# Patient Record
Sex: MALE | Race: WHITE | NOT HISPANIC OR LATINO | Employment: FULL TIME | URBAN - METROPOLITAN AREA
[De-identification: names, ages, dates, MRNs, and addresses within clinical notes are randomized per-mention and may not be internally consistent; named-entity substitution may affect disease eponyms.]

---

## 2017-04-27 DIAGNOSIS — G40.909 SEIZURE DISORDER (H): ICD-10-CM

## 2017-04-27 NOTE — TELEPHONE ENCOUNTER
Routing refill request to provider for review/approval because:  Patient needs to be seen because:  Did not follow up in 10 weeks as advised

## 2017-04-27 NOTE — TELEPHONE ENCOUNTER
lamoTRIgine (LAMICTAL) 25 MG tablet      Last Written Prescription Date:  11/08/16  Last Fill Quantity: 360,   # refills: 1  Last Office Visit with [facility], [facility] or [facility] prescribing provider: 11/08/16  Future Office visit:       Routing refill request to provider for review/approval because:  Drug not on the [facility], [facility] or [facility] refill protocol or controlled substance      [facility]

## 2017-04-29 RX ORDER — LAMOTRIGINE 25 MG/1
50 TABLET ORAL 2 TIMES DAILY
Qty: 120 TABLET | Refills: 0 | Status: SHIPPED | OUTPATIENT
Start: 2017-04-29 | End: 2017-07-05

## 2017-06-13 ENCOUNTER — OFFICE VISIT (OUTPATIENT)
Dept: FAMILY MEDICINE | Facility: OTHER | Age: 42
End: 2017-06-13
Payer: COMMERCIAL

## 2017-06-13 VITALS
RESPIRATION RATE: 16 BRPM | SYSTOLIC BLOOD PRESSURE: 114 MMHG | WEIGHT: 157.5 LBS | TEMPERATURE: 98.6 F | DIASTOLIC BLOOD PRESSURE: 76 MMHG | HEIGHT: 69 IN | HEART RATE: 74 BPM | BODY MASS INDEX: 23.33 KG/M2
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
## 2017-06-13 PROCEDURE — 99213 OFFICE O/P EST LOW 20 MIN: CPT | Performed by: FAMILY MEDICINE

## 2017-06-13 RX ORDER — DOXYCYCLINE HYCLATE 100 MG
200 TABLET ORAL ONCE
Qty: 2 TABLET | Refills: 0 | Status: SHIPPED | OUTPATIENT
Start: 2017-06-13 | End: 2017-06-13

## 2017-06-13 ASSESSMENT — PAIN SCALES - GENERAL: PAINLEVEL: NO PAIN (0)

## 2017-06-13 NOTE — PROGRESS NOTES
"  SUBJECTIVE:                                                    Claude Higgins is a 42 year old male who presents to clinic today for the following health issues:    Concern - Bug bite      Onset: x 1 week    Description:   Left hip, patient states there is a bullseye that changes from large to small.     Intensity: no pain    Progression of Symptoms:  same    Accompanying Signs & Symptoms:  Itches, red, raises bump.       Previous history of similar problem:   no    Precipitating factors:   Worsened by: no    Alleviating factors:  Improved by: no       Therapies Tried and outcome: washed with moonshine and neosporin.        Associated symptoms:  Fever: no noted fevers    Headache no  Sore throat no  Fatigue no  Nausea/vomiting no  Muscle aches no  Joint pain no  Swollen lymph glands no  Stiff neck no  Other no      OBJECTIVE:  Nontoxic male in no acute distress.  /76  Pulse 74  Temp 98.6  F (37  C) (Temporal)  Resp 16  Ht 5' 9.25\" (1.759 m)  Wt 157 lb 8 oz (71.4 kg)  BMI 23.09 kg/m2    EXAM:   Rash description:     Location: left hip      Distribution: localized     Lesion grouping: single patch     Color: tan     Maximum diameter  1cm.        ASSESSMENT / IMPRESSION:  Tick Bite    PLAN:  Orders Placed This Encounter     TOBACCO CESSATION - FOR HEALTH MAINTENANCE     doxycycline (VIBRA-TABS) 100 MG tablet     Report back any chills, fever, significant skin rashes, myalgis or other acute symptoms. Otherwise, follow up as needed.     Loreto Clement MD      Patient Instructions     Tick Bite (No Abx)    You have been bitten by a tick. Ticks are small insects that feed on the blood of rodents, rabbits, birds, deer, dogs, and humans. The bite may cause a small local reaction like that of a spider, with a small amount of local redness, itching and slight swelling. Sometimes there is no local reaction.  Most tick bites are harmless, but some ticks carry diseases, such as Lyme disease or [place] " spotted fever, that can be passed to people at the time of the bite. Lyme disease is of greatest concern. Right now you have no symptoms of Lyme disease or other serious reaction to the bite. It is important to watch for the warning signs, which could appear weeks to months after the tick bite.  Home care  The following will help you care for your bite at home:  1. If itching is a problem, avoid tight clothing and anything that heats up your skin (hot showers/baths, direct sunlight). This will tend to make the itching worse. Use ice packs to reduce redness as well as itching.  2. An ice pack (ice cubes in a plastic bag, wrapped in a towel) will reduce local areas of redness and itching. Creams containing benzocaine (available over-the-counter) will reduce itching.  3. If large areas of the skin are involved, and if no other antihistamine was given, you can use an antihistamine with diphenhydramine, which is available at drug stores or groceries. It may be used to reduce itching if large areas of the skin are involved. If symptoms continue, seek the advice of your doctor or pharmacist about over-the-counter medications.  Follow-up care  Follow up with your doctor or as advised.  When to seek medical care  Get prompt medical attention if any of the following occur:  Signs of local infection (during next few days)    Increasing redness around the bite site    Increased pain or swelling    Fever over 100.4 F (38.0 C)    Fluid draining from the bite area  Signs of tick-related disease (during next few weeks to months)    Circular red ring-like rash appears at the bite area within 1 to 3 weeks    Tiredness, fever or chills, nausea or vomiting    Neck pain or stiffness, headache, confusion    Muscle, bone aching    Irregular or rapid heart beat    Joint pain or swelling (especially the knee joint)    Numbness or tingling or weakness in the arms or legs    Weakness on one side of the face    6652-5303 [employer] [address] [place] [address]. All rights reserved. This information is not intended as a substitute for professional medical care. Always follow your healthcare professional's instructions.

## 2017-06-13 NOTE — PATIENT INSTRUCTIONS
Tick Bite (No Abx)    You have been bitten by a tick. Ticks are small insects that feed on the blood of rodents, rabbits, birds, deer, dogs, and humans. The bite may cause a small local reaction like that of a spider, with a small amount of local redness, itching and slight swelling. Sometimes there is no local reaction.  Most tick bites are harmless, but some ticks carry diseases, such as Lyme disease or [place] spotted fever, that can be passed to people at the time of the bite. Lyme disease is of greatest concern. Right now you have no symptoms of Lyme disease or other serious reaction to the bite. It is important to watch for the warning signs, which could appear weeks to months after the tick bite.  Home care  The following will help you care for your bite at home:  1. If itching is a problem, avoid tight clothing and anything that heats up your skin (hot showers/baths, direct sunlight). This will tend to make the itching worse. Use ice packs to reduce redness as well as itching.  2. An ice pack (ice cubes in a plastic bag, wrapped in a towel) will reduce local areas of redness and itching. Creams containing benzocaine (available over-the-counter) will reduce itching.  3. If large areas of the skin are involved, and if no other antihistamine was given, you can use an antihistamine with diphenhydramine, which is available at drug stores or groceries. It may be used to reduce itching if large areas of the skin are involved. If symptoms continue, seek the advice of your doctor or pharmacist about over-the-counter medications.  Follow-up care  Follow up with your doctor or as advised.  When to seek medical care  Get prompt medical attention if any of the following occur:  Signs of local infection (during next few days)    Increasing redness around the bite site    Increased pain or swelling    Fever over 100.4 F (38.0 C)    Fluid draining from the bite area  Signs of tick-related disease (during next few  weeks to months)    Circular red ring-like rash appears at the bite area within 1 to 3 weeks    Tiredness, fever or chills, nausea or vomiting    Neck pain or stiffness, headache, confusion    Muscle, bone aching    Irregular or rapid heart beat    Joint pain or swelling (especially the knee joint)    Numbness or tingling or weakness in the arms or legs    Weakness on one side of the face    8817-5361 [employer]. [address] [place] [address], [place] [address]. All rights reserved. This information is not intended as a substitute for professional medical care. Always follow your healthcare professional's instructions.

## 2017-06-13 NOTE — NURSING NOTE
"Chief Complaint   Patient presents with     Insect Bites       Initial /76  Pulse 74  Temp 98.6  F (37  C) (Temporal)  Resp 16  Ht 5' 9.25\" (1.759 m)  Wt 157 lb 8 oz (71.4 kg)  BMI 23.09 kg/m2 Estimated body mass index is 23.09 kg/(m^2) as calculated from the following:    Height as of this encounter: 5' 9.25\" (1.759 m).    Weight as of this encounter: 157 lb 8 oz (71.4 kg).  Medication Reconciliation: complete    "

## 2017-06-13 NOTE — MR AVS SNAPSHOT
After Visit Summary   6/13/2017    Claude Higgins    MRN: [id number]           Patient Information     Date Of Birth          1975        Visit Information        Provider Department      6/13/2017 4:00 PM Loreto Clement MD [facility]        Today's Diagnoses     Tick bite, initial encounter    -  1      Care Instructions      Tick Bite (No Abx)    You have been bitten by a tick. Ticks are small insects that feed on the blood of rodents, rabbits, birds, deer, dogs, and humans. The bite may cause a small local reaction like that of a spider, with a small amount of local redness, itching and slight swelling. Sometimes there is no local reaction.  Most tick bites are harmless, but some ticks carry diseases, such as Lyme disease or [place] spotted fever, that can be passed to people at the time of the bite. Lyme disease is of greatest concern. Right now you have no symptoms of Lyme disease or other serious reaction to the bite. It is important to watch for the warning signs, which could appear weeks to months after the tick bite.  Home care  The following will help you care for your bite at home:  1. If itching is a problem, avoid tight clothing and anything that heats up your skin (hot showers/baths, direct sunlight). This will tend to make the itching worse. Use ice packs to reduce redness as well as itching.  2. An ice pack (ice cubes in a plastic bag, wrapped in a towel) will reduce local areas of redness and itching. Creams containing benzocaine (available over-the-counter) will reduce itching.  3. If large areas of the skin are involved, and if no other antihistamine was given, you can use an antihistamine with diphenhydramine, which is available at drug stores or groceries. It may be used to reduce itching if large areas of the skin are involved. If symptoms continue, seek the advice of your doctor or pharmacist about over-the-counter medications.  Follow-up  care  Follow up with your doctor or as advised.  When to seek medical care  Get prompt medical attention if any of the following occur:  Signs of local infection (during next few days)    Increasing redness around the bite site    Increased pain or swelling    Fever over 100.4 F (38.0 C)    Fluid draining from the bite area  Signs of tick-related disease (during next few weeks to months)    Circular red ring-like rash appears at the bite area within 1 to 3 weeks    Tiredness, fever or chills, nausea or vomiting    Neck pain or stiffness, headache, confusion    Muscle, bone aching    Irregular or rapid heart beat    Joint pain or swelling (especially the knee joint)    Numbness or tingling or weakness in the arms or legs    Weakness on one side of the face    3024-4740 [employer]. [address] [place] [address]. All rights reserved. This information is not intended as a substitute for professional medical care. Always follow your healthcare professional's instructions.                Follow-ups after your visit        Who to contact     If you have questions or need follow up information about today's clinic visit or your schedule please contact [facility] directly at [phone].  Normal or non-critical lab and imaging results will be communicated to you by MyChart, letter or phone within 4 business days after the clinic has received the results. If you do not hear from us within 7 days, please contact the clinic through [employer] or phone. If you have a critical or abnormal lab result, we will notify you by phone as soon as possible.  Submit refill requests through [employer] or call your pharmacy and they will forward the refill request to us. Please allow 3 business days for your refill to be completed.          Additional Information About Your Visit        [employer] Information     [employer] gives you secure access to your electronic health record. If you see a primary care  "provider, you can also send messages to your care team and make appointments. If you have questions, please call your primary care clinic.  If you do not have a primary care provider, please call [phone] and they will assist you.        Care EveryWhere ID     This is your Care EveryWhere ID. This could be used by other organizations to access your [facility] medical records  [id number]        Your Vitals Were     Pulse Temperature Respirations Height BMI (Body Mass Index)       74 98.6  F (37  C) (Temporal) 16 5' 9.25\" (1.759 m) 23.09 kg/m2        Blood Pressure from Last 3 Encounters:   06/13/17 114/76   11/08/16 115/76   10/17/16 132/88    Weight from Last 3 Encounters:   06/13/17 157 lb 8 oz (71.4 kg)   11/08/16 161 lb 9.6 oz (73.3 kg)   10/17/16 168 lb (76.2 kg)              We Performed the Following     TOBACCO CESSATION - FOR HEALTH MAINTENANCE          Today's Medication Changes          These changes are accurate as of: 6/13/17  4:29 PM.  If you have any questions, ask your nurse or doctor.               Start taking these medicines.        Dose/Directions    doxycycline 100 MG tablet   Commonly known as:  VIBRA-TABS   Used for:  Tick bite, initial encounter   Started by:  Loreto Clement MD        Dose:  200 mg   Take 2 tablets (200 mg) by mouth once for 1 dose   Quantity:  2 tablet   Refills:  0            Where to get your medicines      These medications were sent to [facility] [place] - [address]   [address] [place] [address]     Phone:  [phone]     doxycycline 100 MG tablet                Primary Care Provider Office Phone # Fax #    Loreto Clement [phone]       [facility] [address] [place] [address]        Thank you!     Thank you for choosing [facility]  for your care. Our goal is always to provide you with excellent care. Hearing back from our patients is one way we can " continue to improve our services. Please take a few minutes to complete the written survey that you may receive in the mail after your visit with us. Thank you!             Your Updated Medication List - Protect others around you: Learn how to safely use, store and throw away your medicines at www.disposemymeds.org.          This list is accurate as of: 6/13/17  4:29 PM.  Always use your most recent med list.                   Brand Name Dispense Instructions for use    ADVIL 200 MG capsule   Generic drug:  ibuprofen      Take 200 mg by mouth 3 times daily as needed       cholecalciferol 1000 UNIT tablet    vitamin D    90 tablet    Take 1 tablet (1,000 Units) by mouth daily       doxycycline 100 MG tablet    VIBRA-TABS    2 tablet    Take 2 tablets (200 mg) by mouth once for 1 dose       lamoTRIgine 25 MG tablet    LaMICtal    120 tablet    Take 2 tablets (50 mg) by mouth 2 times daily Needs office visit before next refill.       levETIRAcetam 250 MG tablet    KEPPRA    360 tablet    4 Weeks supply. Please start taking medications as per given written titrating schedule.

## 2017-07-05 ENCOUNTER — TELEPHONE (OUTPATIENT)
Dept: FAMILY MEDICINE | Facility: CLINIC | Age: 42
End: 2017-07-05

## 2017-07-05 ENCOUNTER — MYC REFILL (OUTPATIENT)
Dept: NEUROLOGY | Facility: CLINIC | Age: 42
End: 2017-07-05

## 2017-07-05 DIAGNOSIS — G40.909 SEIZURE DISORDER (H): ICD-10-CM

## 2017-07-05 NOTE — TELEPHONE ENCOUNTER
lamoTRIgine (LAMICTAL) 25 MG tablet      Last Written Prescription Date:  04/29/17  Last Fill Quantity: 120,   # refills: 0  Last Office Visit with [facility], [facility] or [facility] prescribing provider: 06/13/17  Future Office visit:       Routing refill request to provider for review/approval because:  Drug not on the [facility], [facility] or [facility] refill protocol or controlled substance      [facility]

## 2017-07-05 NOTE — TELEPHONE ENCOUNTER
Called and spoke with patient and explained he was supposed to follow up in 10 weeks, patient says he didn't know that but for now he is out of medication, he dont even have a evening dose for tonight. Patient says he tried to scheduling a follow up but, was told there was an issues with his insurance that he needed to call and figure out. Patient says in the meantime he needs a short term supply until he can figure his insurance out. Please advise  Jasmyn Valencia MA

## 2017-07-06 RX ORDER — LAMOTRIGINE 25 MG/1
50 TABLET ORAL 2 TIMES DAILY
Qty: 120 TABLET | Refills: 0 | Status: SHIPPED | OUTPATIENT
Start: 2017-07-06 | End: 2017-07-12

## 2017-07-06 RX ORDER — LAMOTRIGINE 25 MG/1
50 TABLET ORAL 2 TIMES DAILY
Qty: 120 TABLET | Refills: 0 | Status: SHIPPED | OUTPATIENT
Start: 2017-07-06 | End: 2017-07-23 | Stop reason: DRUGHIGH

## 2017-07-06 NOTE — TELEPHONE ENCOUNTER
Message from [employer]:  Original authorizing provider: Ilan Shen MD    Claude Higgins would like a refill of the following medications:  lamoTRIgine (LAMICTAL) 25 MG tablet [Ilan Shen MD]    Preferred pharmacy: [facility] [place] - [address]     Comment:  I have made a doctor's appointment for Claude to see you on the 12th of July could you please get his prescription filled ASAP! I ONLY HAVE ENOUGH FOR 3 DAYS VIA THE PHARMACY! PLEASE ADVISE!

## 2017-07-06 NOTE — TELEPHONE ENCOUNTER
Medication is being filled for 1 time refill only due to:  Patient needs to be seen because it has been 6 months since last OV. Has appointment on 7/12/2017 and can get further refills then. .      Signed Prescriptions:                        Disp   Refills    lamoTRIgine (LAMICTAL) 25 MG tablet        120 ta*0        Sig: Take 2 tablets (50 mg) by mouth 2 times daily Needs           office visit before next refill.  Authorizing Provider: TOAN MARTIN  Ordering User: CATERINA DURAN RN

## 2017-07-06 NOTE — TELEPHONE ENCOUNTER
lamoTRIgine (LAMICTAL)       Last Written Prescription Date: 04/29/2017  Last Fill Quantity: 120,  # refills: 0   Last Office Visit with [facility] or [facility] prescribing provider: 11/08/2016                                           Next 5 appointments (look out 90 days)     Jul 12, 2017  9:20 AM CDT   Return Visit with Ilan Shen MD   [facility] ([facility]    [address] [place] [address]   [phone]

## 2017-07-12 ENCOUNTER — OFFICE VISIT (OUTPATIENT)
Dept: NEUROLOGY | Facility: CLINIC | Age: 42
End: 2017-07-12
Payer: COMMERCIAL

## 2017-07-12 VITALS
SYSTOLIC BLOOD PRESSURE: 129 MMHG | HEART RATE: 73 BPM | DIASTOLIC BLOOD PRESSURE: 83 MMHG | HEIGHT: 69 IN | WEIGHT: 155 LBS | BODY MASS INDEX: 22.96 KG/M2

## 2017-07-12 DIAGNOSIS — Z78.9 ALCOHOL USE: ICD-10-CM

## 2017-07-12 DIAGNOSIS — G40.909 SEIZURE DISORDER (H): Primary | ICD-10-CM

## 2017-07-12 PROCEDURE — 99214 OFFICE O/P EST MOD 30 MIN: CPT | Performed by: PSYCHIATRY & NEUROLOGY

## 2017-07-12 NOTE — PATIENT INSTRUCTIONS
Orders Placed This Encounter   Procedures     GGT    Lamotrigine level (Ordered last time) along with GGT blood test as soon as possible     *Please call [facility] near your home to have morning appointment for the blood draw.   Blood sample for seizure medication (TROUGH LEVEL), before the 1st dose in the morning                   Previous CT Brain and blood test results reviewed again       Advised to seek help of Loreto Clement MD regarding alcohol intake. Encouraged to reduce and stop taking alcohol because of potential for it affecting seizures control.      Reminded again:  To follow the seizure precautions including driving restrictions as per [place] Law including- to report a seizure and date it occurred within thirty (30) days of the episode or event     Importance of compliance with anti-seizure medication (s).      Documenting the frequency of the seizures                 To go to local hospital ER for acute emergencies/seizure management  and arrange office follow-up for further review of medications    To call us for follow-up appointment after the work-up.    Thanks

## 2017-07-12 NOTE — NURSING NOTE
"Chief Complaint   Patient presents with     RECHECK     Seizure medication       Initial /83 (BP Location: Right arm, Patient Position: Chair, Cuff Size: Adult Regular)  Pulse 73  Ht 1.759 m (5' 9.25\")  Wt 70.3 kg (155 lb)  BMI 22.72 kg/m2 Estimated body mass index is 22.72 kg/(m^2) as calculated from the following:    Height as of this encounter: 1.759 m (5' 9.25\").    Weight as of this encounter: 70.3 kg (155 lb).  Medication Reconciliation: complete   Jasmyn Valencia MA      "

## 2017-07-12 NOTE — MR AVS SNAPSHOT
After Visit Summary   7/12/2017    Claude Higgins    MRN: [id number]           Patient Information     Date Of Birth          1975        Visit Information        Provider Department      7/12/2017 9:20 AM Ilan Shen MD [facility]        Today's Diagnoses     Seizure disorder (H)    -  1    Alcohol use          Care Instructions         Orders Placed This Encounter   Procedures     GGT    Lamotrigine level (Ordered last time) along with GGT blood test as soon as possible     *Please call [facility] near your home to have morning appointment for the blood draw.   Blood sample for seizure medication (TROUGH LEVEL), before the 1st dose in the morning                   Previous CT Brain and blood test results reviewed again       Advised to seek help of Loreto Clement MD regarding alcohol intake. Encouraged to reduce and stop taking alcohol because of potential for it affecting seizures control.      Reminded again:  To follow the seizure precautions including driving restrictions as per [place] Law including- to report a seizure and date it occurred within thirty (30) days of the episode or event     Importance of compliance with anti-seizure medication (s).      Documenting the frequency of the seizures                 To go to local hospital ER for acute emergencies/seizure management  and arrange office follow-up for further review of medications    To call us for follow-up appointment after the work-up.    Thanks            Follow-ups after your visit        Follow-up notes from your care team     Return in about 3 months (around 10/12/2017) for Seizure.      Future tests that were ordered for you today     Open Future Orders        Priority Expected Expires Ordered    GGT Routine 7/13/2017 7/12/2018 7/12/2017            Who to contact     If you have questions or need follow up information about today's clinic visit or your schedule please contact [facility] directly at [phone].  Normal or non-critical lab and imaging results will be communicated to you by MyChart, letter or phone within 4 business days after the clinic has received the results. If you do not hear from us within 7 days, please contact the clinic through [employer] or phone. If you have a critical or abnormal lab result, we will notify you by phone as soon as possible.  Submit refill requests through [employer] or call your pharmacy and they will forward the refill request to us. Please allow 3 business days for your refill to be completed.          Additional Information About Your Visit        [employer] Information     [employer] gives you secure access to your electronic health record. If you see a primary care provider, you can also send messages to your care team and make appointments. If you have questions, please call your primary care clinic.  If you do not have a primary care provider, please call [phone] and they will assist you.        Care EveryWhere ID     This is your Care EveryWhere ID. This could be used by other organizations to access your [facility] medical records  [id number]        Your Vitals Were     Pulse Height BMI (Body Mass Index)             73 1.759 m (5' 9.25\") 22.72 kg/m2          Blood Pressure from Last 3 Encounters:   07/12/17 129/83   06/13/17 114/76   11/08/16 115/76    Weight from Last 3 Encounters:   07/12/17 70.3 kg (155 lb)   06/13/17 71.4 kg (157 lb 8 oz)   11/08/16 73.3 kg (161 lb 9.6 oz)                 Today's Medication Changes          These changes are accurate as of: 7/12/17  9:36 AM.  If you have any questions, ask your nurse or doctor.               Stop taking these medicines if you haven't already. Please contact your care team if you have questions.     levETIRAcetam 250 MG tablet   Commonly known as:  KEPPRA   Stopped by:  Ilan Shen MD                    Primary Care Provider Office Phone # Fax #    Loreto Clement MD " [phone]       [facility] [address] [place] [address]        Equal Access to Services     CAROL DURAN : Hadii aad ku hadblanca Sage, idaniada maria alejandragalina, camille kaigor portillo, piero wallrachael phan. So [facility] [phone].    ATENCIÓN: Si habla español, tiene a nicholas disposición servicios gratuitos de asistencia lingüística. Llame al [phone].    We comply with applicable federal civil rights laws and [place] laws. We do not discriminate on the basis of race, color, national origin, age, disability sex, sexual orientation or gender identity.            Thank you!     Thank you for choosing [facility]  for your care. Our goal is always to provide you with excellent care. Hearing back from our patients is one way we can continue to improve our services. Please take a few minutes to complete the written survey that you may receive in the mail after your visit with us. Thank you!             Your Updated Medication List - Protect others around you: Learn how to safely use, store and throw away your medicines at www.disposemymeds.org.          This list is accurate as of: 7/12/17  9:36 AM.  Always use your most recent med list.                   Brand Name Dispense Instructions for use Diagnosis    ADVIL 200 MG capsule   Generic drug:  ibuprofen      Take 200 mg by mouth 3 times daily as needed        cholecalciferol 1000 UNIT tablet    vitamin D    90 tablet    Take 1 tablet (1,000 Units) by mouth daily    Low vitamin D level       lamoTRIgine 25 MG tablet    LaMICtal    120 tablet    Take 2 tablets (50 mg) by mouth 2 times daily Needs office visit before next refill.    Seizure disorder (H)

## 2017-07-12 NOTE — PROGRESS NOTES
ESTABLISHED PATIENT NEUROLOGY NOTE    LOCATION: [facility]   DATE OF VISIT: 2017  NAME: Mr.Aaron Higgins  : 1975 (42 year old)  MR #: [id number]    PRIMARY/REFERRING PROVIDER: Loreto Clement MD    REASON FOR VISIT: Seizure    HISTORY OF PRESENT ILLNESS: Mr. Higgins is a 42-year-old man with history of seizures. Last visit was in 2016. He has been taking lamotrigine 25 mg strength, 2 tablets 2 times a day. Previously he was taking Keppra and apparently it gave him side effects and therefore Keppra was discontinued. No side effects reported with lamotrigine. He is compliant with lamotrigine. Unfortunately he did not have the lamotrigine level done before this visit as suggested during last visit.No recurrence of seizures.  He relates that he has reduced his alcohol intake. He is drinking alcohol 2-3 times a week. He is drinking only beer, 2-3 beers each time. I note that his GGT done by Loreto Clement MD was quite high in 2016.  He had low normal vitamin D and he is compliant with taking vitamin D on a daily basis.  Vitamin B 12 level has been normal.  CT of the head done previously was normal.  His EEG done previously was also normal.  Thiamine level was in the normal range.      RECENT DIAGNOSTIC STUDIES:   Blood tests:    Component      Latest Ref Rng & Units 10/7/2016   GGT      0 - 75 U/L 266 (H)       CURRENT MEDICATIONS:   Current Outpatient Prescriptions on File Prior to Visit:  lamoTRIgine (LAMICTAL) 25 MG tablet Take 2 tablets (50 mg) by mouth 2 times daily Needs office visit before next refill.   cholecalciferol (VITAMIN D) 1000 UNIT tablet Take 1 tablet (1,000 Units) by mouth daily   ibuprofen (ADVIL) 200 MG capsule Take 200 mg by mouth 3 times daily as needed   [DISCONTINUED] lamoTRIgine (LAMICTAL) 25 MG tablet Take 2 tablets (50 mg) by mouth 2 times daily Needs office visit before next refill. (Patient not  "taking: Reported on 7/12/2017)     No current facility-administered medications on file prior to visit.   PAST MEDICAL HISTORY: Past Medical History:   Diagnosis Date     Alcohol use     Out-patient chemical dependancy treatment in 2003     GERD (gastroesophageal reflux disease)      Inguinal hernia 2015    left     Tobacco abuse      Past Surgical, Personal & Social history reviewed & documented in the [facility].  GENERAL EXAMINATION:  /83 (BP Location: Right arm, Patient Position: Chair, Cuff Size: Adult Regular)  Pulse 73  Ht 1.759 m (5' 9.25\")  Wt 70.3 kg (155 lb)  BMI 22.72 kg/m2    IMPRESSION:   Encounter Diagnoses   Name Primary?     Seizure disorder (H) Yes     Alcohol use      COMMENTS: Doing good so far. I'm hopeful that his seizures are controlled but the most important thing is that if he could reduce and stop taking ethanol as it would improve seizure control apart from lamotrigine. May have to adjust his lamotrigine dose after reviewing trough level of lamotrigine.  PLANS:   Patient Instructions          Orders Placed This Encounter   Procedures     GGT    Lamotrigine level (Ordered last time) along with GGT blood test as soon as possible     *Please call [facility] near your home to have morning appointment for the blood draw.   Blood sample for seizure medication (TROUGH LEVEL), before the 1st dose in the morning                   Previous CT Brain and blood test results reviewed again       Advised to seek help of Loreto Clement MD regarding alcohol intake. Encouraged to reduce and stop taking alcohol because of potential for it affecting seizures control.      Reminded again:  To follow the seizure precautions including driving restrictions as per [place] Law including- to report a seizure and date it occurred within thirty (30) days of the episode or event     Importance of compliance with anti-seizure medication (s).      Documenting the frequency of the seizures                 " To go to local hospital ER for acute emergencies/seizure management  and arrange office follow-up for further review of medications    To call us for follow-up appointment after the work-up.    Thanks    Thanks to Loreto Clement MD for allowing me to participate in Mr. Higgins's care. Please feel free to call me with any questions or concerns.     Total Time: 25 minutes. More than 50% of the time spent on counseling/coordinating the care.    Ilan Shen MD, [facility]  Neurologist    Cc: Loreto Clement MD

## 2017-07-18 DIAGNOSIS — G40.909 SEIZURE DISORDER (H): ICD-10-CM

## 2017-07-18 DIAGNOSIS — Z78.9 ALCOHOL USE: ICD-10-CM

## 2017-07-18 LAB — GGT SERPL-CCNC: 89 U/L (ref 0–75)

## 2017-07-18 PROCEDURE — 82977 ASSAY OF GGT: CPT | Performed by: PSYCHIATRY & NEUROLOGY

## 2017-07-18 PROCEDURE — 36415 COLL VENOUS BLD VENIPUNCTURE: CPT | Performed by: PSYCHIATRY & NEUROLOGY

## 2017-07-19 NOTE — PROGRESS NOTES
Ángel Mr. Higgins,   Your liver function tests and GGT is reduced compared to the previous level. Therefore I would encourage you to keep reducing your intake or stopping drinking alcohol.  Follow-up as recommended during your recent visit.      Thanks,       Ilan Shen MD, [facility]  Neurologist

## 2017-07-20 DIAGNOSIS — G40.909 SEIZURE DISORDER (H): ICD-10-CM

## 2017-07-20 PROCEDURE — 80175 DRUG SCREEN QUAN LAMOTRIGINE: CPT | Mod: 90 | Performed by: PSYCHIATRY & NEUROLOGY

## 2017-07-20 PROCEDURE — 36415 COLL VENOUS BLD VENIPUNCTURE: CPT | Performed by: PSYCHIATRY & NEUROLOGY

## 2017-07-20 PROCEDURE — 99000 SPECIMEN HANDLING OFFICE-LAB: CPT | Performed by: PSYCHIATRY & NEUROLOGY

## 2017-07-21 LAB — LAMOTRIGINE SERPL-MCNC: 1.5 UG/ML

## 2017-07-23 DIAGNOSIS — G40.909 SEIZURE DISORDER (H): Primary | ICD-10-CM

## 2017-07-23 RX ORDER — LAMOTRIGINE 25 MG/1
TABLET ORAL
Qty: 180 TABLET | Refills: 1 | Status: SHIPPED | OUTPATIENT
Start: 2017-07-23 | End: 2017-10-02

## 2017-07-23 NOTE — PROGRESS NOTES
New Lamotrigine prescription ordered in view of low Lamotrigine level.   Lamotrigine level suggested from week starting on 8/14/2017 to adjust Lamotrigine dose if needed.

## 2017-07-23 NOTE — PROGRESS NOTES
Ángel Mr. Higgins,     Your blood LAMOTRIGINE LEVEL is low. Therefore revised Lamotrigine dose ordered along with blood level again need  checking week beginning 8/14/2017 to adjust the dose if needed.  Follow-up after LAMOTRIGINE LEVEL in August, 2017.    Thanks,       Ilan Shen MD, [facility]  Neurologist

## 2017-07-31 ENCOUNTER — HOSPITAL ENCOUNTER (EMERGENCY)
Facility: CLINIC | Age: 42
Discharge: HOME | End: 2017-07-31
Attending: NURSE PRACTITIONER | Admitting: NURSE PRACTITIONER
Payer: COMMERCIAL

## 2017-07-31 ENCOUNTER — APPOINTMENT (OUTPATIENT)
Dept: GENERAL RADIOLOGY | Facility: CLINIC | Age: 42
End: 2017-07-31
Attending: NURSE PRACTITIONER
Payer: COMMERCIAL

## 2017-07-31 VITALS
WEIGHT: 157 LBS | RESPIRATION RATE: 15 BRPM | DIASTOLIC BLOOD PRESSURE: 72 MMHG | BODY MASS INDEX: 23.25 KG/M2 | HEIGHT: 69 IN | OXYGEN SATURATION: 99 % | SYSTOLIC BLOOD PRESSURE: 132 MMHG | HEART RATE: 74 BPM | TEMPERATURE: 98.9 F

## 2017-07-31 DIAGNOSIS — M25.511 RIGHT SHOULDER PAIN, UNSPECIFIED CHRONICITY: ICD-10-CM

## 2017-07-31 DIAGNOSIS — S49.91XA INJURY OF RIGHT SHOULDER, INITIAL ENCOUNTER: ICD-10-CM
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
## 2017-07-31 DIAGNOSIS — S49.91XA SHOULDER INJURY, RIGHT, INITIAL ENCOUNTER: ICD-10-CM

## 2017-07-31 PROCEDURE — 99283 EMERGENCY DEPT VISIT LOW MDM: CPT | Performed by: NURSE PRACTITIONER

## 2017-07-31 PROCEDURE — 73030 X-RAY EXAM OF SHOULDER: CPT | Mod: TC,RT

## 2017-07-31 PROCEDURE — 99284 EMERGENCY DEPT VISIT MOD MDM: CPT | Mod: Z6 | Performed by: NURSE PRACTITIONER

## 2017-07-31 PROCEDURE — 25000132 ZZH RX MED GY IP 250 OP 250 PS 637: Performed by: NURSE PRACTITIONER

## 2017-07-31 RX ORDER — ACETAMINOPHEN 325 MG/1
975 TABLET ORAL ONCE
Status: COMPLETED | OUTPATIENT
Start: 2017-07-31 | End: 2017-07-31

## 2017-07-31 RX ORDER — IBUPROFEN 600 MG/1
600 TABLET, FILM COATED ORAL ONCE
Status: COMPLETED | OUTPATIENT
Start: 2017-07-31 | End: 2017-07-31

## 2017-07-31 RX ADMIN — ACETAMINOPHEN 975 MG: 325 TABLET ORAL at 15:58

## 2017-07-31 RX ADMIN — IBUPROFEN 600 MG: 600 TABLET ORAL at 15:56

## 2017-07-31 ASSESSMENT — ENCOUNTER SYMPTOMS
NECK PAIN: 0
VOMITING: 0

## 2017-07-31 NOTE — DISCHARGE INSTRUCTIONS
Shoulder Sprain  A sprain is a stretching or tearing of the ligaments that hold a joint together. A sprain may take up to 8 weeks to fully heal, depending on how severe it is. Moderate to severe shoulder sprains are treated with a sling or shoulder immobilizer. Minor sprains can be treated without any special support.  Home care  The following guidelines will help you care for your injury at home:    If a sling was given to you, leave it in place for the time advised by your healthcare provider. If you aren t sure how long to wear it, ask for advice. If the sling becomes loose, adjust it so that your forearm is level with the ground. Your shoulder should feel well supported.    Put an ice pack on the injured area for 20 minutes every 1 to 2 hours the first day. You can make your own ice pack by putting ice cubes in a plastic bag. A bag of frozen peas or something similar works well too. Wrap the bag in a thin towel. Continue with ice packs 3 to 4 times a day for the next 2 to 3 days. Then use the pack as needed to ease pain and swelling.    You may use acetaminophen or ibuprofen to control pain, unless another pain medicine was prescribed. If you have chronic liver or kidney disease, talk with your healthcare provider before using these medicines. Also talk with your provider if you ve had a stomach ulcer or gastrointestinal bleeding.    Shoulder joints become stiff if left in a sling for too long. You should start range of motion exercises about 7 to 10 days after the injury. Talk with your provider to find out what type of exercises to do and how soon to start.  Follow-up care  Follow up with your healthcare provider, or as advised.  Any X-rays you had today don t show any broken bones, breaks, or fractures. Sometimes fractures don t show up on the first X-ray. Bruises and sprains can sometimes hurt as much as a fracture. These injuries can take time to heal completely. If your symptoms don t improve or they get  worse, talk with your provider. You may need a repeat X-ray or other treatments.  When to seek medical advice  Call your healthcare provider right away if any of these occur:    Shoulder pain or swelling in your arm that gets worse    Fingers become cold, blue, numb, or tingly    Large amount of bruising of the shoulder or upper arm    Fever or chills  Date Last Reviewed: 8/1/2016 2000-2017 [employer]. [address] [place] [address], [place] [address]. All rights reserved. This information is not intended as a substitute for professional medical care. Always follow your healthcare professional's instructions.      Claude, you can take Ibuprofen 600 mg every 6 hours for pain/inflammation.  You can alternate 650 mg of Tylenol every 4 hours as needed.  Follow up with orthopedics as scheduled, if this isn't starting to improve in the next couple of days, you will likely need an MRI.  Wear sling as much as possible.  Ice shoulder 20 minutes at a time several times a day.

## 2017-07-31 NOTE — ED AVS SNAPSHOT
[facility] Emergency Department    [address] [place] [address]    Phone:  [phone]    Fax:  [phone]                                       Claude Higgins   MRN: [id number]    Department:  [facility] Emergency Department   Date of Visit:  7/31/2017           After Visit Summary Signature Page     I have received my discharge instructions, and my questions have been answered. I have discussed any challenges I see with this plan with the nurse or doctor.    ..........................................................................................................................................  Patient/Patient Representative Signature      ..........................................................................................................................................  Patient Representative Print Name and Relationship to Patient    ..................................................               ................................................  Date                                            Time    ..........................................................................................................................................  Reviewed by Signature/Title    ...................................................              ..............................................  Date                                                            Time

## 2017-07-31 NOTE — ED NOTES
"Pt states he flipped his lawnmower last night at 1700 and did a wheelie.  States he was \"just out toolin around\" and that the lawnmower has a lift kit.  States he can't lift his right arm unless he uses his left arm to lift.  Went to work this morning and did everything left handed.  He went to his chiropractor today and they recommended that he go to an orthopedist and have an MRI done and they couldn't fit him in and so he came here.  "

## 2017-07-31 NOTE — ED AVS SNAPSHOT
[facility] Emergency Department    [address] [place] [address]    Phone:  [phone]    Fax:  [phone]                                       Claude Higgins   MRN: [id number]    Department:  [facility] Emergency Department   Date of Visit:  7/31/2017           Patient Information     Date Of Birth          1975        Your diagnoses for this visit were:     Shoulder injury, right, initial encounter ? labrum tear       You were seen by Gila Nick, NIC CNP.      Follow-up Information     Follow up with Rabia Gaspar MD.    Specialty:  Orthopaedic Surgery    Why:  This Friday, August 4th at 7:40 AM    Contact information:    [address] [place] [address]  [phone]          Discharge Instructions         Shoulder Sprain  A sprain is a stretching or tearing of the ligaments that hold a joint together. A sprain may take up to 8 weeks to fully heal, depending on how severe it is. Moderate to severe shoulder sprains are treated with a sling or shoulder immobilizer. Minor sprains can be treated without any special support.  Home care  The following guidelines will help you care for your injury at home:    If a sling was given to you, leave it in place for the time advised by your healthcare provider. If you aren t sure how long to wear it, ask for advice. If the sling becomes loose, adjust it so that your forearm is level with the ground. Your shoulder should feel well supported.    Put an ice pack on the injured area for 20 minutes every 1 to 2 hours the first day. You can make your own ice pack by putting ice cubes in a plastic bag. A bag of frozen peas or something similar works well too. Wrap the bag in a thin towel. Continue with ice packs 3 to 4 times a day for the next 2 to 3 days. Then use the pack as needed to ease pain and swelling.    You may use acetaminophen or ibuprofen to control pain, unless another pain medicine  was prescribed. If you have chronic liver or kidney disease, talk with your healthcare provider before using these medicines. Also talk with your provider if you ve had a stomach ulcer or gastrointestinal bleeding.    Shoulder joints become stiff if left in a sling for too long. You should start range of motion exercises about 7 to 10 days after the injury. Talk with your provider to find out what type of exercises to do and how soon to start.  Follow-up care  Follow up with your healthcare provider, or as advised.  Any X-rays you had today don t show any broken bones, breaks, or fractures. Sometimes fractures don t show up on the first X-ray. Bruises and sprains can sometimes hurt as much as a fracture. These injuries can take time to heal completely. If your symptoms don t improve or they get worse, talk with your provider. You may need a repeat X-ray or other treatments.  When to seek medical advice  Call your healthcare provider right away if any of these occur:    Shoulder pain or swelling in your arm that gets worse    Fingers become cold, blue, numb, or tingly    Large amount of bruising of the shoulder or upper arm    Fever or chills  Date Last Reviewed: 8/1/2016 2000-2017 [employer]. [address] [place] [address]. All rights reserved. This information is not intended as a substitute for professional medical care. Always follow your healthcare professional's instructions.      Claude, you can take Ibuprofen 600 mg every 6 hours for pain/inflammation.  You can alternate 650 mg of Tylenol every 4 hours as needed.  Follow up with orthopedics as scheduled, if this isn't starting to improve in the next couple of days, you will likely need an MRI.  Wear sling as much as possible.  Ice shoulder 20 minutes at a time several times a day.      Future Appointments        Provider Department Dept Phone Center    8/4/2017 7:40 AM Rabia Gaspar MD [facility] [phone]  [facility]    10/18/2017 9:20 AM Ilan Shen MD [facility] [phone] [facility]      24 Hour Appointment Hotline       To make an appointment at any [facility] [place], call [phone] ([phone]). If you don't have a family doctor or clinic, we will help you find one. [facility] are conveniently located to serve the needs of you and your family.             Review of your medicines      Our records show that you are taking the medicines listed below. If these are incorrect, please call your family doctor or clinic.        Dose / Directions Last dose taken    ADVIL 200 MG capsule   Dose:  200 mg   Generic drug:  ibuprofen        Take 200 mg by mouth 3 times daily as needed   Refills:  0        cholecalciferol 1000 UNIT tablet   Commonly known as:  vitamin D   Dose:  1000 Units   Quantity:  90 tablet        Take 1 tablet (1,000 Units) by mouth daily   Refills:  3        lamoTRIgine 25 MG tablet   Commonly known as:  LaMICtal   Quantity:  180 tablet        Week 1: Take 2 tablets morning and 3 tablets evening. Week 2 and afterwards: Take 3 tablets two times a day   Refills:  1                Procedures and tests performed during your visit     Shoulder XR, G/E 3 views, right      Orders Needing Specimen Collection     None      Pending Results     Date and Time Order Name Status Description    7/31/2017 1516 Shoulder XR, G/E 3 views, right Preliminary             Pending Culture Results     No orders found from 7/29/2017 to 8/1/2017.            Pending Results Instructions     If you had any lab results that were not finalized at the time of your Discharge, you can call the ED Lab Result RN at [phone]. You will be contacted by this team for any positive Lab results or changes in treatment. The nurses are available 7 days a week from 10A to 6:30P.  You can leave a message 24 hours per day and they will return your call.        Thank you for choosing [facility]       Thank you  for choosing [facility] for your care. Our goal is always to provide you with excellent care. Hearing back from our patients is one way we can continue to improve our services. Please take a few minutes to complete the written survey that you may receive in the mail after you visit with us. Thank you!        [employer] Information     [employer] gives you secure access to your electronic health record. If you see a primary care provider, you can also send messages to your care team and make appointments. If you have questions, please call your primary care clinic.  If you do not have a primary care provider, please call [phone] and they will assist you.        Care EveryWhere ID     This is your Care EveryWhere ID. This could be used by other organizations to access your [facility] medical records  [id number]        Equal Access to Services     CAROL DURAN : Mehul Sage, reyes king, camille portillo, piero chisholm. So [facility] [phone].    ATENCIÓN: Si habla español, tiene a nicholas disposición servicios gratuitos de asistencia lingüística. Llame al [phone].    We comply with applicable federal civil rights laws and [place] laws. We do not discriminate on the basis of race, color, national origin, age, disability sex, sexual orientation or gender identity.            After Visit Summary       This is your record. Keep this with you and show to your community pharmacist(s) and doctor(s) at your next visit.

## 2017-07-31 NOTE — LETTER
[facility] EMERGENCY DEPARTMENT  [address] [place] [address]  [phone]    2017    Claude Higgins  [address]   [address] [facility] [address]  [phone] (home)     : 1975      To Whom it may concern:    Claude Higgins was seen in our Emergency Department today, 2017. He will need to wear a sling on his right arm until cleared by orthopedics.  He cannot utilize his right arm while at work and will need to be on light duty.     Thank you.      Sincerely,        NIC Holloway, CNP

## 2017-07-31 NOTE — ED PROVIDER NOTES
"  History     Chief Complaint   Patient presents with     Arm Injury     right shoulder     The history is provided by the patient.     Claude Higgins is a 42 year old male who presents to the ED with a right shoulder injury. Patient states he flipped his lawnmower last night at 1700 and did a wheelie, landing on his back and right shoulder. He states that he was just \"out toolin around\" and the lawnmower has a lift kit. Patient is unable to lift his right arm without the use of his left arm, he can hold it up after using his left arm to lift it. Patient went to work this morning and only used his left hand. He states his neck is ok. He is complaining of xyphoid tenderness. Patient saw a chiropractor today and they recommended that he have an MRI done by an orthopedist who couldn't fit him into their schedule, consequently, he came to the ED. Patient states he took 4 doses of Advil this morning at 0400. His PCP is Dr. Mansfield in [place]. Patient denies vomiting.      I have reviewed the Medications, Allergies, Past Medical and Surgical History, and Social History in the Epic system.    Allergies:   Allergies   Allergen Reactions     Penicillin G Other (See Comments)     \"i am given a generic PCN every 5 yr or so when I have strep throat and I do fine with that. I don't think I am allergic to PCN.         No current facility-administered medications on file prior to encounter.   Current Outpatient Prescriptions on File Prior to Encounter:  lamoTRIgine (LAMICTAL) 25 MG tablet Week 1: Take 2 tablets morning and 3 tablets evening. Week 2 and afterwards: Take 3 tablets two times a day   cholecalciferol (VITAMIN D) 1000 UNIT tablet Take 1 tablet (1,000 Units) by mouth daily   ibuprofen (ADVIL) 200 MG capsule Take 200 mg by mouth 3 times daily as needed       Patient Active Problem List   Diagnosis     Hyperlipidemia LDL goal <160     Tobacco use disorder     Unilateral inguinal hernia without obstruction or gangrene, " "recurrence not specified     Alcohol use     GERD (gastroesophageal reflux disease)     Cannabis abuse     Seizure disorder (H)       Past Surgical History:   Procedure Laterality Date     HERNIA REPAIR, INGUINAL RT/LT Right 1976     HERNIORRHAPHY INGUINAL Left 7/10/2015    Procedure: HERNIORRHAPHY INGUINAL;  Surgeon: Vicente Aleman MD;  Location: [facility] OR     SURGICAL HISTORY OF -  Right 7/28/2003-04    Tib/fib, ORIF, bone and skin grafting and arterial bypass     SURGICAL HISTORY OF -  Right 2008    Tib/fib, hardware removal.      TESTICLE SURGERY Right 1993    Torsion correction, and prev. undescended       Social History   Substance Use Topics     Smoking status: Current Every Day Smoker     Packs/day: 1.00     Years: 17.00     Types: Cigarettes     Smokeless tobacco: Current User     Alcohol use No      Comment: occasional.  \"started having alcohol seizures so I quit\"       Most Recent Immunizations   Administered Date(s) Administered     TD (ADULT, 7+) 01/01/2003     TDAP Vaccine (Adacel) 07/02/2015       BMI: Estimated body mass index is 23.18 kg/(m^2) as calculated from the following:    Height as of this encounter: 1.753 m (5' 9\").    Weight as of this encounter: 71.2 kg (157 lb).      Review of Systems   Gastrointestinal: Negative for vomiting.   Musculoskeletal: Negative for neck pain.        Right shoulder pain. Can't lift arm without use of left arm, can hold it up in the air once lifted up by left arm. Xyphoid tenderness.   All other systems reviewed and are negative.      Physical Exam   BP: 132/88  Pulse: 81  Temp: 98.9  F (37.2  C)  Resp: 15  Height: 175.3 cm (5' 9\")  Weight: 71.2 kg (157 lb)  SpO2: 99 %  Physical Exam   Constitutional: He is oriented to person, place, and time. He appears well-developed and well-nourished.   HENT:   Head: Normocephalic and atraumatic.   Eyes: Conjunctivae and EOM are normal.   Neck: Normal range of motion. Neck supple.   Abdominal: Soft. Bowel sounds are normal. " There is no tenderness.   Musculoskeletal: He exhibits tenderness. He exhibits no edema or deformity.   Tenderness to right humeral head.   Neurological: He is alert and oriented to person, place, and time. He has normal strength. No sensory deficit.   Muscle strength 5/5.   Skin: Skin is warm. No rash noted.   Psychiatric: He has a normal mood and affect. His behavior is normal.   Nursing note and vitals reviewed.      ED Course     ED Course     Procedures          Results for orders placed or performed during the hospital encounter of 07/31/17 (from the past 24 hour(s))   Shoulder XR, G/E 3 views, right    Narrative    RIGHT SHOULDER THREE OR MORE VIEWS  7/31/2017 4:08 PM     HISTORY: Pain.    COMPARISON: None.     FINDINGS:   There is no fracture.  The humeral head is well located  within the glenoid fossa.  Glenohumeral, acromioclavicular, and  coracoclavicular spaces are well maintained.  Visualized portions of  the adjacent lung are clear.       Impression    IMPRESSION:  Negative right shoulder x-rays. If there is clinical  concern for rotator cuff pathology, further evaluation with MRI may be  helpful.       Medications   ibuprofen (ADVIL/MOTRIN) tablet 600 mg (not administered)   acetaminophen (TYLENOL) tablet 975 mg (not administered)       Assessments & Plan (with Medical Decision Making)  Claude is a 42-year-old male who presents to the emergency department today with complaints of right shoulder pain and the inability to lift his arm up above his head without using his left arm to do so since falling off of his homemade lawnmower last night at 1700 while he was doing a wheelie.  Please refer to HPI and focused exam.  Patient has no deformity, his distal and proximal pulses are intact, sensation is intact, strength with right hand  is 5 out of 5.  Likely he possibly tore or strained shoulder ligament/tendon.  X-ray was obtained to rule out any osseous abnormality and is negative.  I discussed  findings with patient, I scheduled him a follow-up appointment this coming Friday with her orthopedic doctor, Dr. Gaspar, he will likely need MRI if his symptoms do not improve over the next couple of days.  Patient was placed in a sling which I informed him to wear as much as possible, we discussed ice and elevation, I did recommend ibuprofen scheduled 600 mg every 6 hours, he can alternate this with Tylenol as needed.  I did offer patient something stronger for pain which he declined.  Reasons to return to the emergency room were discussed, patient is agreeable to plan of care and was discharged in stable condition.    Patient was given a note for work that he is unable to use his right arm until cleared by orthopedics.       I have reviewed the nursing notes.    I have reviewed the findings, diagnosis, plan and need for follow up with the patient.      New Prescriptions    No medications on file       Final diagnoses:   Shoulder injury, right, initial encounter     This document serves as a record of services personally performed by Gila Nick AP-NP. It was created on their behalf by Cliff Randhawa, a trained medical scribe. The creation of this record is based on the provider's personal observations and the statements of the patient. This document has been checked and approved by the attending provider.    Note: Chart documentation done in part with Dragon Voice Recognition software. Although reviewed after completion, some word and grammatical errors may remain.  7/31/2017   [facility] EMERGENCY DEPARTMENT     Gila Nick, NIC KINGSLEY  07/31/17 [id number]

## 2017-08-02 NOTE — PROGRESS NOTES
"ORTHOPEDIC CLINIC CONSULT      Claude Higgins is a 42 year old male who is seen in consultation at the request of   Emergency Department.  History of Present illness:  Claude presents for evaluation of:   1.) Right shoulder injury    Onset:  7/30/17    Symptoms brought on by flipped on a tracktor.   Location:  Right shoulder.    Character:  dull ache.    Progression of symptoms:  A little better.    Previous similar pain: no .   Pain Level:  4/10.   Previous treatments:  rest/inactivity, immobilization, support wrap and NSAID - ibu.  Currently on Blood thinners? no  Diagnosis of Diabetes? no    History of Present Illness:   Mechanism of Injury: Patient reports that he flipped his lawnmower and fell back onto his shoulder.  Location of Pain: right shoulder anterior, superior, inferior scapula  Duration of Pain:  Date of injury: 7/31/2017  Rating of Pain:  Mild to moderate.    Pain Quality: dull, aching and sharp  Pain is better with: Rest  Pain is worse with:  Use of right upper extremity   Treatment so far consists of:  rest, Ice, Ibuprofen, sling.  He was seen in the emergency department after his injury.   Associated Features: None - denies any numbness/tingling down the upper extremity  Prior history of related problems:   Denies other areas of injury apart from a \"bruised hip\", which is not causing him pain now.  He reports he has had some chronic shoulder pain prior to this injury.  The pain is getting better.        Patient's past medical, surgical, social and family histories reviewed.     Past Medical History:   Diagnosis Date     Alcohol use     Out-patient chemical dependancy treatment in 2003     GERD (gastroesophageal reflux disease)      Inguinal hernia 2015    left     Tobacco abuse        Past Surgical History:   Procedure Laterality Date     HERNIA REPAIR, INGUINAL RT/LT Right 1976     HERNIORRHAPHY INGUINAL Left 7/10/2015    Procedure: HERNIORRHAPHY INGUINAL;  Surgeon: Vicente Aleman MD;  " "Location: [facility] OR     SURGICAL HISTORY OF -  Right 7/28/2003-04    Tib/fib, ORIF, bone and skin grafting and arterial bypass     SURGICAL HISTORY OF -  Right 2008    Tib/fib, hardware removal.      TESTICLE SURGERY Right 1993    Torsion correction, and prev. undescended       Medications:    Current Outpatient Prescriptions on File Prior to Visit:  lamoTRIgine (LAMICTAL) 25 MG tablet Week 1: Take 2 tablets morning and 3 tablets evening. Week 2 and afterwards: Take 3 tablets two times a day   cholecalciferol (VITAMIN D) 1000 UNIT tablet Take 1 tablet (1,000 Units) by mouth daily   ibuprofen (ADVIL) 200 MG capsule Take 200 mg by mouth 3 times daily as needed     No current facility-administered medications on file prior to visit.     Allergies   Allergen Reactions     Penicillin G Other (See Comments)     \"i am given a generic PCN every 5 yr or so when I have strep throat and I do fine with that. I don't think I am allergic to PCN.       Social History     Occupational History     Construction      [employer]     Social History Main Topics     Smoking status: Current Every Day Smoker     Packs/day: 1.00     Years: 17.00     Types: Cigarettes     Smokeless tobacco: Current User     Alcohol use No      Comment: occasional.  \"started having alcohol seizures so I quit\"     Drug use: Yes     Special: Marijuana      Comment: occasionally marijuana- patient states a couple times a month.     Sexual activity: Yes     Partners: Female       Family History   Problem Relation Age of Onset     Myocardial Infarction Father      Myocardial Infarction Maternal Grandfather      Myocardial Infarction Paternal Grandfather        REVIEW OF SYSTEMS  General: negative for, night sweats, dizziness, fatigue  Resp: No shortness of breath and no cough  CV: negative for chest pain, syncope or near-syncope  GI: negative for nausea, vomiting and diarrhea  : negative for dysuria and hematuria  Musculoskeletal: as above  Neurologic: negative for syncope " "  Hematologic: negative for bleeding disorder    Physical Exam:  Vitals: Temp 97.9  F (36.6  C) (Temporal)  Ht 1.759 m (5' 9.25\")  Wt 70.5 kg (155 lb 8 oz)  BMI 22.8 kg/m2  BMI= Body mass index is 22.8 kg/(m^2).  Constitutional: healthy, alert and no acute distress   Psychiatric: mentation appears normal and affect normal/bright  NEURO: no focal deficits  SKIN: no excoriation or erythema. No signs of infection.  JOINT/EXTREMITIES:right   SHOULDER Exam-Right   Inspection: no swelling, no bruising, no discoloration, no obvious deformity, no asymmetry, no glenohumeral joint anterior bulge, no distal clavicle elevation, no muscle atrophy, no scapular winging, shoulders held in protracted position.     Tenderness of: SC joint- no, clavicle(prox-mid)- no, clavicle-(mid-distal)- no, AC joint- no, acromion- no, anterior capsule- no, prox bicep tendon- no, greater tuberosity- no, prox humerus- no, supraspinatous- no, infraspinatous- yes, superior trapezious- no, rhomboids- yes   Range of Motion: Active- forward flexion- normal, abduction- normal, external rotation- normal, internal rotation- normal  Range of Motion: Passive- forward flexion- normal, abduction- normal, external rotation- normal, internal rotation- normal   Strength: abduction- 3/5, internal rotation- 5/5, external rotation- 3/5, and bicep- full   Special tests: Neers- POSITIVE, Pastor(supraspinatous)- negative, O'Briens- equivocal, cross arm adduction- negative, anterior apprehension- negative, Speeds- negative and Yergasons- POSITIVE, normal lift off   Distal neurovascular intact. Fingers warm, dry, pink with good capillary refill.      GAIT: not tested     Diagnostic Modalities:  right shoulder X-ray: Well preserved glenohumeral articular space. No fracture, dislocation and or lesion.   Independent visualization of the images was performed.      Impression: Right shoulder injury with supraspinatus and infraspinatus weakness on exam    Plan:  All of the " above pertinent physical exam and imaging modalities findings was reviewed with Claude.    Given his rotator cuff weakness on exam I have recommended obtaining an MRI scan of his right shoulder to further evaluate the rotator cuff and surrounding soft tissues.      I have recommended Mobic to relieve pain/inflammation.    He was provided with stretching exercises for his shoulder.      Recommended he discontinue use of his sling ten days after injury.      Return to clinic to discuss test results, or sooner as needed for changes.  Re-x-ray on return: No    Scribed by  Chel Herring PA-C  8/4/2017  7:56 AM    I attest I have seen and evaluated the patient. I agree with above impression and plan.    Sera Gaspar MD

## 2017-08-04 ENCOUNTER — OFFICE VISIT (OUTPATIENT)
Dept: ORTHOPEDICS | Facility: CLINIC | Age: 42
End: 2017-08-04
Payer: COMMERCIAL

## 2017-08-04 VITALS — BODY MASS INDEX: 23.03 KG/M2 | TEMPERATURE: 97.9 F | WEIGHT: 155.5 LBS | HEIGHT: 69 IN

## 2017-08-04 DIAGNOSIS — M25.511 ACUTE PAIN OF RIGHT SHOULDER: Primary | ICD-10-CM

## 2017-08-04 PROCEDURE — 99203 OFFICE O/P NEW LOW 30 MIN: CPT | Performed by: ORTHOPAEDIC SURGERY

## 2017-08-04 RX ORDER — MELOXICAM 15 MG/1
15 TABLET ORAL DAILY
Qty: 30 TABLET | Refills: 2 | Status: SHIPPED | OUTPATIENT
Start: 2017-08-04 | End: 2018-01-25

## 2017-08-04 ASSESSMENT — PAIN SCALES - GENERAL: PAINLEVEL: MODERATE PAIN (4)

## 2017-08-04 NOTE — MR AVS SNAPSHOT
After Visit Summary   8/4/2017    Claude Higgins    MRN: [id number]           Patient Information     Date Of Birth          1975        Visit Information        Provider Department      8/4/2017 7:40 AM Rabia Gaspar MD [facility]        Today's Diagnoses     Acute pain of right shoulder    -  1      Care Instructions      Exercises for Shoulder Flexibility: Wall Walk    Improving your flexibility can reduce pain. Stretching exercises also can help increase your range of pain-free motion. Breathe normally when you exercise. Use smooth, fluid movements.  Note: Follow any special instructions you are given. If you feel pain, stop the exercise. If the pain continues after stopping, call your healthcare provider:    Stand with your shoulder about 2 feet from the wall.    Raise your arm to shoulder level and gently  walk  your fingers up the wall as high as you can.    Hold for a few seconds. Then walk your fingers back down.    Repeat 3 times. Move closer to the wall as you repeat.    Build up to holding each stretch for 30 seconds.  Caution: Do this stretch only if your healthcare provider recommends it. Don t do it when you are first injured.       Date Last Reviewed: 8/16/2015 2000-2017 [employer]. [address] [place] [address]. All rights reserved. This information is not intended as a substitute for professional medical care. Always follow your healthcare professional's instructions.        Exercises for Shoulder Flexibility: External Rotation    This stretch can help restore shoulder flexibility and relieve pain over time. When stretching, be sure to breathe deeply. Follow any special instructions from your doctor or physical therapist:  1.  a doorway. Grasp the doorjamb with the hand on the frozen side. Your arm should be bent.  2. With the other hand, hold the elbow on the frozen side firmly against your body.  3. Standing in the  same spot, rotate your body away from the doorjamb. Stop when you feel the stretch in the shoulder. At first, try to hold the stretch for 5 seconds.  4. Work up to doing 3 sets of this stretch, 3 times a day. Work up to holding the stretch for 30 to 60 seconds.  Note: Keep your arms as still as you can. Over time, rotate your body a little more to enhance the stretch. But be careful not to twist your back.  Frozen shoulder  Frozen shoulder is another name for adhesive capsulitis, which causes restricted movement in the shoulder. If you have frozen shoulder, this stretch may cause discomfort, especially when you first get started. A few months may pass before you achieve the results you want. But once your shoulder heals, it rarely becomes frozen again. So stick to your stretching program. If you have any questions, be sure to ask your doctor.   Date Last Reviewed: 8/16/2015 2000-2017 [employer]. [address] [place] [address]. All rights reserved. This information is not intended as a substitute for professional medical care. Always follow your healthcare professional's instructions.                Follow-ups after your visit        Your next 10 appointments already scheduled     Aug 06, 2017  9:00 AM CDT   MR SHOULDER RIGHT W/O CONTRAST with PHMR1   [facility] ([facility])    [address] [place] [id number] [phone]           Take your medicines as usual, unless your doctor tells you not to. Bring a list of your current medicines to your exam (including vitamins, minerals and over-the-counter drugs). Also bring the results of similar scans you may have had.  Please remove any body piercings and hair extensions before you arrive.  Follow your doctor s orders. If you do not, we may have to postpone your exam.  You will not have contrast for this exam. You do not need to do anything special to prepare.  The MRI machine uses a strong magnet.  Please wear clothes without metal (snaps, zippers). A sweatsuit works well, or we may give you a hospital gown.   **IMPORTANT** THE INSTRUCTIONS BELOW ARE ONLY FOR THOSE PATIENTS WHO HAVE BEEN TOLD THEY WILL RECEIVE SEDATION OR GENERAL ANESTHESIA DURING THEIR MRI PROCEDURE:  IF YOU WILL RECEIVE SEDATION (take medicine to help you relax during your exam):   You must get the medicine from your doctor before you arrive. Bring the medicine to the exam. Do not take it at home.   Arrive one hour early. Bring someone who can take you home after the test. Your medicine will make you sleepy. After the exam, you may not drive, take a bus or take a taxi by yourself.   No eating 8 hours before your exam. You may have clear liquids up until 4 hours before your exam. (Clear liquids include water, clear tea, black coffee and fruit juice without pulp.)  IF YOU WILL RECEIVE ANESTHESIA (be asleep for your exam):   Arrive 1 1/2 hours early. Bring someone who can take you home after the test. You may not drive, take a bus or take a taxi by yourself.   No eating 8 hours before your exam. You may have clear liquids up until 4 hours before your exam. (Clear liquids include water, clear tea, black coffee and fruit juice without pulp.)   You will spend four to five hours in the recovery room.  Please call the Imaging Department at your exam site with any questions.            Aug 09, 2017  1:20 PM CDT   [facility]    [address] [facility] [address]   [phone]            Oct 18, 2017  9:20 AM CDT   [facility] Visit with Ilan Shen MD   [facility]    [address] [place] [id number] [phone]              Future tests that were ordered for you today     Open Future Orders        Priority Expected Expires Ordered    MR Shoulder Right w/o Contrast Routine  8/4/2018 8/4/2017           "  Who to contact     If you have questions or need follow up information about today's clinic visit or your schedule please contact [facility] directly at [phone].  Normal or non-critical lab and imaging results will be communicated to you by [employer], letter or phone within 4 business days after the clinic has received the results. If you do not hear from us within 7 days, please contact the clinic through [employer] or phone. If you have a critical or abnormal lab result, we will notify you by phone as soon as possible.  Submit refill requests through [facility] or call your pharmacy and they will forward the refill request to us. Please allow 3 business days for your refill to be completed.          Additional Information About Your Visit        [facility] Information     [facility] gives you secure access to your electronic health record. If you see a primary care provider, you can also send messages to your care team and make appointments. If you have questions, please call your primary care clinic.  If you do not have a primary care provider, please call [phone] and they will assist you.        Care EveryWhere ID     This is your Care EveryWhere ID. This could be used by other organizations to access your [facility] medical records  [id number]        Your Vitals Were     Temperature Height BMI (Body Mass Index)             97.9  F (36.6  C) (Temporal) 1.759 m (5' 9.25\") 22.8 kg/m2          Blood Pressure from Last 3 Encounters:   07/31/17 132/72   07/12/17 129/83   06/13/17 114/76    Weight from Last 3 Encounters:   08/04/17 70.5 kg (155 lb 8 oz)   07/31/17 71.2 kg (157 lb)   07/12/17 70.3 kg (155 lb)                 Today's Medication Changes          These changes are accurate as of: 8/4/17  8:59 AM.  If you have any questions, ask your nurse or doctor.               Start taking these medicines.        Dose/Directions    meloxicam 15 MG tablet   Commonly known as:  MOBIC   Used for:  Acute pain " of right shoulder   Started by:  Rabia Gaspar MD        Dose:  15 mg   Take 1 tablet (15 mg) by mouth daily   Quantity:  30 tablet   Refills:  2            Where to get your medicines      These medications were sent to [facility] - [address]   [address] [place] [address]     Phone:  [phone]     meloxicam 15 MG tablet                Primary Care Provider Office Phone # Fax #    Loreto Merced Clement [phone]       [facility] [address] [place] [address]        Equal Access to Services     [facility]: Hadii aad ku hadasho Soomaali, waaxda luqadaha, qaybta kaalmada adeegyada, waxgene vizcarrain hayaan adeeg imelda lopes . So [facility] [phone].    ATENCIÓN: Si habla español, tiene a nicholas disposición servicios gratuitos de asistencia lingüística. [facility] [phone].    We comply with applicable federal civil rights laws and [place] laws. We do not discriminate on the basis of race, color, national origin, age, disability sex, sexual orientation or gender identity.            Thank you!     Thank you for choosing [facility]  for your care. Our goal is always to provide you with excellent care. Hearing back from our patients is one way we can continue to improve our services. Please take a few minutes to complete the written survey that you may receive in the mail after your visit with us. Thank you!             Your Updated Medication List - Protect others around you: Learn how to safely use, store and throw away your medicines at www.disposemymeds.org.          This list is accurate as of: 8/4/17  8:59 AM.  Always use your most recent med list.                   Brand Name Dispense Instructions for use Diagnosis    ADVIL 200 MG capsule   Generic drug:  ibuprofen      Take 200 mg by mouth 3 times daily as needed        cholecalciferol 1000 UNIT tablet    vitamin D    90 tablet    Take 1 tablet (1,000  Units) by mouth daily    Low vitamin D level       lamoTRIgine 25 MG tablet    LaMICtal    180 tablet    Week 1: Take 2 tablets morning and 3 tablets evening. Week 2 and afterwards: Take 3 tablets two times a day    Seizure disorder (H)       meloxicam 15 MG tablet    MOBIC    30 tablet    Take 1 tablet (15 mg) by mouth daily    Acute pain of right shoulder

## 2017-08-04 NOTE — LETTER
"    8/4/2017         RE: Claude Higgins  PO   [facility] [address]        Dear Colleague,    Thank you for referring your patient, Claude Higgins, to the [facility]. Please see a copy of my visit note below.    ORTHOPEDIC CLINIC CONSULT      Claude Higgins is a 42 year old male who is seen in consultation at the request of   Emergency Department.  History of Present illness:  Claude presents for evaluation of:   1.) Right shoulder injury    Onset:  7/30/17    Symptoms brought on by flipped on a tracktor.   Location:  Right shoulder.    Character:  dull ache.    Progression of symptoms:  A little better.    Previous similar pain: no .   Pain Level:  4/10.   Previous treatments:  rest/inactivity, immobilization, support wrap and NSAID - ibu.  Currently on Blood thinners? no  Diagnosis of Diabetes? no    History of Present Illness:   Mechanism of Injury: Patient reports that he flipped his lawnmower and fell back onto his shoulder.  Location of Pain: right shoulder anterior, superior, inferior scapula  Duration of Pain:  Date of injury: 7/31/2017  Rating of Pain:  Mild to moderate.    Pain Quality: dull, aching and sharp  Pain is better with: Rest  Pain is worse with:  Use of right upper extremity   Treatment so far consists of:  rest, Ice, Ibuprofen, sling.  He was seen in the emergency department after his injury.   Associated Features: None - denies any numbness/tingling down the upper extremity  Prior history of related problems:   Denies other areas of injury apart from a \"bruised hip\", which is not causing him pain now.  He reports he has had some chronic shoulder pain prior to this injury.  The pain is getting better.        Patient's past medical, surgical, social and family histories reviewed.     Past Medical History:   Diagnosis Date     Alcohol use     Out-patient chemical dependancy treatment in 2003     GERD (gastroesophageal reflux disease)      Inguinal hernia 2015    left     " "Tobacco abuse        Past Surgical History:   Procedure Laterality Date     HERNIA REPAIR, INGUINAL RT/LT Right 1976     HERNIORRHAPHY INGUINAL Left 7/10/2015    Procedure: HERNIORRHAPHY INGUINAL;  Surgeon: Vicente Aleman MD;  Location: [facility] OR     SURGICAL HISTORY OF -  Right 7/28/2003-04    Tib/fib, ORIF, bone and skin grafting and arterial bypass     SURGICAL HISTORY OF -  Right 2008    Tib/fib, hardware removal.      TESTICLE SURGERY Right 1993    Torsion correction, and prev. undescended       Medications:    Current Outpatient Prescriptions on File Prior to Visit:  lamoTRIgine (LAMICTAL) 25 MG tablet Week 1: Take 2 tablets morning and 3 tablets evening. Week 2 and afterwards: Take 3 tablets two times a day   cholecalciferol (VITAMIN D) 1000 UNIT tablet Take 1 tablet (1,000 Units) by mouth daily   ibuprofen (ADVIL) 200 MG capsule Take 200 mg by mouth 3 times daily as needed     No current facility-administered medications on file prior to visit.     Allergies   Allergen Reactions     Penicillin G Other (See Comments)     \"i am given a generic PCN every 5 yr or so when I have strep throat and I do fine with that. I don't think I am allergic to PCN.       Social History     Occupational History     Construction      [employer]     Social History Main Topics     Smoking status: Current Every Day Smoker     Packs/day: 1.00     Years: 17.00     Types: Cigarettes     Smokeless tobacco: Current User     Alcohol use No      Comment: occasional.  \"started having alcohol seizures so I quit\"     Drug use: Yes     Special: Marijuana      Comment: occasionally marijuana- patient states a couple times a month.     Sexual activity: Yes     Partners: Female       Family History   Problem Relation Age of Onset     Myocardial Infarction Father      Myocardial Infarction Maternal Grandfather      Myocardial Infarction Paternal Grandfather        REVIEW OF SYSTEMS  General: negative for, night sweats, dizziness, fatigue  Resp: No " "shortness of breath and no cough  CV: negative for chest pain, syncope or near-syncope  GI: negative for nausea, vomiting and diarrhea  : negative for dysuria and hematuria  Musculoskeletal: as above  Neurologic: negative for syncope   Hematologic: negative for bleeding disorder    Physical Exam:  Vitals: Temp 97.9  F (36.6  C) (Temporal)  Ht 1.759 m (5' 9.25\")  Wt 70.5 kg (155 lb 8 oz)  BMI 22.8 kg/m2  BMI= Body mass index is 22.8 kg/(m^2).  Constitutional: healthy, alert and no acute distress   Psychiatric: mentation appears normal and affect normal/bright  NEURO: no focal deficits  SKIN: no excoriation or erythema. No signs of infection.  JOINT/EXTREMITIES:right   SHOULDER Exam-Right   Inspection: no swelling, no bruising, no discoloration, no obvious deformity, no asymmetry, no glenohumeral joint anterior bulge, no distal clavicle elevation, no muscle atrophy, no scapular winging, shoulders held in protracted position.     Tenderness of: SC joint- no, clavicle(prox-mid)- no, clavicle-(mid-distal)- no, AC joint- no, acromion- no, anterior capsule- no, prox bicep tendon- no, greater tuberosity- no, prox humerus- no, supraspinatous- no, infraspinatous- yes, superior trapezious- no, rhomboids- yes   Range of Motion: Active- forward flexion- normal, abduction- normal, external rotation- normal, internal rotation- normal  Range of Motion: Passive- forward flexion- normal, abduction- normal, external rotation- normal, internal rotation- normal   Strength: abduction- 3/5, internal rotation- 5/5, external rotation- 3/5, and bicep- full   Special tests: Neers- POSITIVE, Pastor(supraspinatous)- negative, O'Briens- equivocal, cross arm adduction- negative, anterior apprehension- negative, Speeds- negative and Yergasons- POSITIVE, normal lift off   Distal neurovascular intact. Fingers warm, dry, pink with good capillary refill.      GAIT: not tested     Diagnostic Modalities:  right shoulder X-ray: Well preserved " glenohumeral articular space. No fracture, dislocation and or lesion.   Independent visualization of the images was performed.      Impression: Right shoulder injury with supraspinatus and infraspinatus weakness on exam    Plan:  All of the above pertinent physical exam and imaging modalities findings was reviewed with Claude.    Given his rotator cuff weakness on exam I have recommended obtaining an MRI scan of his right shoulder to further evaluate the rotator cuff and surrounding soft tissues.      I have recommended Mobic to relieve pain/inflammation.    He was provided with stretching exercises for his shoulder.      Recommended he discontinue use of his sling ten days after injury.      Return to clinic to discuss test results, or sooner as needed for changes.  Re-x-ray on return: No    Scribed by  Chel Herring PA-C  8/4/2017  7:56 AM    I attest I have seen and evaluated the patient. I agree with above impression and plan.    Sera Gaspar MD      Again, thank you for allowing me to participate in the care of your patient.        Sincerely,        Rabia Gaspar MD

## 2017-08-04 NOTE — NURSING NOTE
"Chief Complaint   Patient presents with     Consult     Right shoulder injury, DOI: 7/30/17- ref: ED       Initial Temp 97.9  F (36.6  C) (Temporal)  Ht 1.759 m (5' 9.25\")  Wt 70.5 kg (155 lb 8 oz)  BMI 22.8 kg/m2 Estimated body mass index is 22.8 kg/(m^2) as calculated from the following:    Height as of this encounter: 1.759 m (5' 9.25\").    Weight as of this encounter: 70.5 kg (155 lb 8 oz).  Medication Reconciliation: complete     Jasen/JOE     "

## 2017-08-04 NOTE — PATIENT INSTRUCTIONS
Exercises for Shoulder Flexibility: Wall Walk    Improving your flexibility can reduce pain. Stretching exercises also can help increase your range of pain-free motion. Breathe normally when you exercise. Use smooth, fluid movements.  Note: Follow any special instructions you are given. If you feel pain, stop the exercise. If the pain continues after stopping, call your healthcare provider:    Stand with your shoulder about 2 feet from the wall.    Raise your arm to shoulder level and gently  walk  your fingers up the wall as high as you can.    Hold for a few seconds. Then walk your fingers back down.    Repeat 3 times. Move closer to the wall as you repeat.    Build up to holding each stretch for 30 seconds.  Caution: Do this stretch only if your healthcare provider recommends it. Don t do it when you are first injured.       Date Last Reviewed: 8/16/2015 2000-2017 [employer]. [address] [place] [address]. All rights reserved. This information is not intended as a substitute for professional medical care. Always follow your healthcare professional's instructions.        Exercises for Shoulder Flexibility: External Rotation    This stretch can help restore shoulder flexibility and relieve pain over time. When stretching, be sure to breathe deeply. Follow any special instructions from your doctor or physical therapist:  1.  a doorway. Grasp the doorjamb with the hand on the frozen side. Your arm should be bent.  2. With the other hand, hold the elbow on the frozen side firmly against your body.  3. Standing in the same spot, rotate your body away from the doorjamb. Stop when you feel the stretch in the shoulder. At first, try to hold the stretch for 5 seconds.  4. Work up to doing 3 sets of this stretch, 3 times a day. Work up to holding the stretch for 30 to 60 seconds.  Note: Keep your arms as still as you can. Over time, rotate your body a little more to enhance the  stretch. But be careful not to twist your back.  Frozen shoulder  Frozen shoulder is another name for adhesive capsulitis, which causes restricted movement in the shoulder. If you have frozen shoulder, this stretch may cause discomfort, especially when you first get started. A few months may pass before you achieve the results you want. But once your shoulder heals, it rarely becomes frozen again. So stick to your stretching program. If you have any questions, be sure to ask your doctor.   Date Last Reviewed: 8/16/2015 2000-2017 [employer]. [address] [place] [address], [place] [address]. All rights reserved. This information is not intended as a substitute for professional medical care. Always follow your healthcare professional's instructions.

## 2017-08-06 ENCOUNTER — HOSPITAL ENCOUNTER (OUTPATIENT)
Dept: MRI IMAGING | Facility: CLINIC | Age: 42
Discharge: HOME | End: 2017-08-06
Attending: ORTHOPAEDIC SURGERY | Admitting: ORTHOPAEDIC SURGERY
Payer: COMMERCIAL

## 2017-08-06 PROCEDURE — 73221 MRI JOINT UPR EXTREM W/O DYE: CPT | Mod: RT

## 2017-08-09 ENCOUNTER — OFFICE VISIT (OUTPATIENT)
Dept: ORTHOPEDICS | Facility: CLINIC | Age: 42
End: 2017-08-09
Payer: COMMERCIAL

## 2017-08-09 VITALS — BODY MASS INDEX: 22.96 KG/M2 | TEMPERATURE: 97 F | HEIGHT: 69 IN | WEIGHT: 155 LBS

## 2017-08-09 DIAGNOSIS — S46.911D SHOULDER STRAIN, RIGHT, SUBSEQUENT ENCOUNTER: Primary | ICD-10-CM

## 2017-08-09 PROCEDURE — 99213 OFFICE O/P EST LOW 20 MIN: CPT | Performed by: ORTHOPAEDIC SURGERY

## 2017-08-09 ASSESSMENT — PAIN SCALES - GENERAL: PAINLEVEL: NO PAIN (0)

## 2017-08-09 NOTE — NURSING NOTE
"Chief Complaint   Patient presents with     RECHECK     MRI results of right shoulder       Initial Temp 97  F (36.1  C)  Ht 1.759 m (5' 9.25\")  Wt 70.3 kg (155 lb)  BMI 22.72 kg/m2 Estimated body mass index is 22.72 kg/(m^2) as calculated from the following:    Height as of this encounter: 1.759 m (5' 9.25\").    Weight as of this encounter: 70.3 kg (155 lb).  Medication Reconciliation: complete    BP completed using cuff size: NA (Not Taken)    Winnie Berry MA      "

## 2017-08-09 NOTE — LETTER
"    8/9/2017         RE: Claude Higgins  PO   [facility] [address]        Dear Colleague,    Thank you for referring your patient, Claude Higgins, to the [facility]. Please see a copy of my visit note below.    Office Visit-Follow up    Chief Complaint: Claude Higgins is a 42 year old male who is being seen for   Chief Complaint   Patient presents with     RECHECK     MRI results of right shoulder       History of Present Illness:   Feels decent, no sling  Take mobic with some relief    REVIEW OF SYSTEMS  General: negative for, night sweats, dizziness, fatigue  Resp: No shortness of breath and no cough  CV: negative for chest pain, syncope or near-syncope  GI: negative for nausea, vomiting and diarrhea  : negative for dysuria and hematuria  Musculoskeletal: as above  Neurologic: negative for syncope   Hematologic: negative for bleeding disorder    Physical Exam:  Vitals: Temp 97  F (36.1  C)  Ht 1.759 m (5' 9.25\")  Wt 70.3 kg (155 lb)  BMI 22.72 kg/m2  BMI= Body mass index is 22.72 kg/(m^2).  Constitutional: healthy, alert and no acute distress   Psychiatric: mentation appears normal and affect normal/bright  NEURO: no focal deficits  RESP: Normal with easy respirations and no use of accessory muscles to breathe, no audible wheezing or retractions  CV: No peripheral edema  SKIN: No erythema, rashes, excoriation, or breakdown. No evidence of infection.   JOINT/EXTREMITIES:right shoulder - most pain along tip and lateral scapula, otherwise FROM, mild impingement, good strength in rotator cuff  GAIT: non-antalgic            Diagnostic Modalities:  right shoulder MRI:  Normal cartilage, no rotator cuff tears. Bicep and labrum intact. No SLAP tears.   Independent visualization of the images was performed.      Impression: right shoulder strain    Plan:  All of the above pertinent physical exam and imaging modalities findings was reviewed with Claude.                                          " CONSERVATIVE CARE:  I recommend conservative care for the patient to include NSAIDs, Tylenol, focused self directed physical therapy, activity modifications. Today I provided or dispensed info and hep.                                                FUTURE PLAN:  On their return if they still have symptoms we will consider physical therapy, injection of steroids.      Return to clinic 6, week(s), or sooner as needed for changes.  Re-x-ray on return: No    Rabia Gaspar M.D.          Again, thank you for allowing me to participate in the care of your patient.        Sincerely,        Rabia Gaspar MD

## 2017-08-09 NOTE — PATIENT INSTRUCTIONS
Shoulder Sprain  A sprain is a stretching or tearing of the ligaments that hold a joint together. A sprain may take up to 8 weeks to fully heal, depending on how severe it is. Moderate to severe shoulder sprains are treated with a sling or shoulder immobilizer. Minor sprains can be treated without any special support.  Home care  The following guidelines will help you care for your injury at home:    If a sling was given to you, leave it in place for the time advised by your healthcare provider. If you aren t sure how long to wear it, ask for advice. If the sling becomes loose, adjust it so that your forearm is level with the ground. Your shoulder should feel well supported.    Put an ice pack on the injured area for 20 minutes every 1 to 2 hours the first day. You can make your own ice pack by putting ice cubes in a plastic bag. A bag of frozen peas or something similar works well too. Wrap the bag in a thin towel. Continue with ice packs 3 to 4 times a day for the next 2 to 3 days. Then use the pack as needed to ease pain and swelling.    You may use acetaminophen or ibuprofen to control pain, unless another pain medicine was prescribed. If you have chronic liver or kidney disease, talk with your healthcare provider before using these medicines. Also talk with your provider if you ve had a stomach ulcer or gastrointestinal bleeding.    Shoulder joints become stiff if left in a sling for too long. You should start range of motion exercises about 7 to 10 days after the injury. Talk with your provider to find out what type of exercises to do and how soon to start.  Follow-up care  Follow up with your healthcare provider, or as advised.  Any X-rays you had today don t show any broken bones, breaks, or fractures. Sometimes fractures don t show up on the first X-ray. Bruises and sprains can sometimes hurt as much as a fracture. These injuries can take time to heal completely. If your symptoms don t improve or they get  worse, talk with your provider. You may need a repeat X-ray or other treatments.  When to seek medical advice  Call your healthcare provider right away if any of these occur:    Shoulder pain or swelling in your arm that gets worse    Fingers become cold, blue, numb, or tingly    Large amount of bruising of the shoulder or upper arm    Fever or chills  Date Last Reviewed: 8/1/2016 2000-2017 [employer]. [address] [place] [address]. All rights reserved. This information is not intended as a substitute for professional medical care. Always follow your healthcare professional's instructions.        Shoulder Exercises: Side Raise    This exercise stretches and strengthens your shoulders. Before starting, read through all the instructions. During the exercise, breathe normally and use smooth movements. Stop if you feel any pain. If pain persists, call your healthcare provider.    Stand straight, holding a ____ pound weight in each hand, arms at sides, feet shoulder-width apart.    Slowly extend your arms up and out until weights are at shoulder level. Slowly return to starting position.    Repeat ____ times. Do ____ sets ____ times a day.     CAUTION: Don t swing the weights or raise weights above shoulder level.   Date Last Reviewed: 8/16/2015 2000-2017 [employer]. [address] [place] [address]. All rights reserved. This information is not intended as a substitute for professional medical care. Always follow your healthcare professional's instructions.        Exercises for Shoulder Flexibility: Wall Walk    Improving your flexibility can reduce pain. Stretching exercises also can help increase your range of pain-free motion. Breathe normally when you exercise. Use smooth, fluid movements.  Note: Follow any special instructions you are given. If you feel pain, stop the exercise. If the pain continues after stopping, call your healthcare provider:    Stand with  your shoulder about 2 feet from the wall.    Raise your arm to shoulder level and gently  walk  your fingers up the wall as high as you can.    Hold for a few seconds. Then walk your fingers back down.    Repeat 3 times. Move closer to the wall as you repeat.    Build up to holding each stretch for 30 seconds.  Caution: Do this stretch only if your healthcare provider recommends it. Don t do it when you are first injured.       Date Last Reviewed: 8/16/2015 2000-2017 [employer]. [address] [place] [address], [place] [address]. All rights reserved. This information is not intended as a substitute for professional medical care. Always follow your healthcare professional's instructions.        Exercises for Shoulder Flexibility: External Rotation    This stretch can help restore shoulder flexibility and relieve pain over time. When stretching, be sure to breathe deeply. Follow any special instructions from your doctor or physical therapist:  1.  a doorway. Grasp the doorjamb with the hand on the frozen side. Your arm should be bent.  2. With the other hand, hold the elbow on the frozen side firmly against your body.  3. Standing in the same spot, rotate your body away from the doorjamb. Stop when you feel the stretch in the shoulder. At first, try to hold the stretch for 5 seconds.  4. Work up to doing 3 sets of this stretch, 3 times a day. Work up to holding the stretch for 30 to 60 seconds.  Note: Keep your arms as still as you can. Over time, rotate your body a little more to enhance the stretch. But be careful not to twist your back.  Frozen shoulder  Frozen shoulder is another name for adhesive capsulitis, which causes restricted movement in the shoulder. If you have frozen shoulder, this stretch may cause discomfort, especially when you first get started. A few months may pass before you achieve the results you want. But once your shoulder heals, it rarely becomes frozen again. So stick to  your stretching program. If you have any questions, be sure to ask your doctor.   Date Last Reviewed: 8/16/2015 2000-2017 [employer]. [address] [place] [address]. All rights reserved. This information is not intended as a substitute for professional medical care. Always follow your healthcare professional's instructions.        Shoulder Exercises: External Rotation    Strengthening exercises help make your injured shoulder more stable. To warm up, do flexibility (stretching) exercises first. Your healthcare provider will tell you what size hand weights to use for the strengthening exercise below. If you don t have hand weights, try using cans of soup instead:    Lie on your uninjured side with your head supported by a pillow or your arm. Place a small rolled-up towel under your top elbow.    Grasp a hand weight with your top hand and bend that arm to a right angle, resting your forearm against your stomach.    Keeping your elbow against the towel, slowly lift the weight until your forearm is slightly higher than your elbow. Return to the starting position. Repeat.    Work up to 5 to 15 lifts.  Date Last Reviewed: 8/16/2015 2000-2017 [employer]. [address] [place] [address]. All rights reserved. This information is not intended as a substitute for professional medical care. Always follow your healthcare professional's instructions.

## 2017-08-09 NOTE — PROGRESS NOTES
"Office Visit-Follow up    Chief Complaint: Claude Higgins is a 42 year old male who is being seen for   Chief Complaint   Patient presents with     RECHECK     MRI results of right shoulder       History of Present Illness:   Feels decent, no sling  Take mobic with some relief    REVIEW OF SYSTEMS  General: negative for, night sweats, dizziness, fatigue  Resp: No shortness of breath and no cough  CV: negative for chest pain, syncope or near-syncope  GI: negative for nausea, vomiting and diarrhea  : negative for dysuria and hematuria  Musculoskeletal: as above  Neurologic: negative for syncope   Hematologic: negative for bleeding disorder    Physical Exam:  Vitals: Temp 97  F (36.1  C)  Ht 1.759 m (5' 9.25\")  Wt 70.3 kg (155 lb)  BMI 22.72 kg/m2  BMI= Body mass index is 22.72 kg/(m^2).  Constitutional: healthy, alert and no acute distress   Psychiatric: mentation appears normal and affect normal/bright  NEURO: no focal deficits  RESP: Normal with easy respirations and no use of accessory muscles to breathe, no audible wheezing or retractions  CV: No peripheral edema  SKIN: No erythema, rashes, excoriation, or breakdown. No evidence of infection.   JOINT/EXTREMITIES:right shoulder - most pain along tip and lateral scapula, otherwise FROM, mild impingement, good strength in rotator cuff  GAIT: non-antalgic            Diagnostic Modalities:  right shoulder MRI:  Normal cartilage, no rotator cuff tears. Bicep and labrum intact. No SLAP tears.   Independent visualization of the images was performed.      Impression: right shoulder strain    Plan:  All of the above pertinent physical exam and imaging modalities findings was reviewed with Claude.                                          CONSERVATIVE CARE:  I recommend conservative care for the patient to include NSAIDs, Tylenol, focused self directed physical therapy, activity modifications. Today I provided or dispensed info and hep.                                    "             FUTURE PLAN:  On their return if they still have symptoms we will consider physical therapy, injection of steroids.      Return to clinic 6, week(s), or sooner as needed for changes.  Re-x-ray on return: No    Rabia Gaspar M.D.

## 2017-08-09 NOTE — MR AVS SNAPSHOT
After Visit Summary   8/9/2017    Claude Higgins    MRN: [id number]           Patient Information     Date Of Birth          1975        Visit Information        Provider Department      8/9/2017 1:20 PM Rabia Gaspar MD [facility]        Care Instructions      Shoulder Sprain  A sprain is a stretching or tearing of the ligaments that hold a joint together. A sprain may take up to 8 weeks to fully heal, depending on how severe it is. Moderate to severe shoulder sprains are treated with a sling or shoulder immobilizer. Minor sprains can be treated without any special support.  Home care  The following guidelines will help you care for your injury at home:    If a sling was given to you, leave it in place for the time advised by your healthcare provider. If you aren t sure how long to wear it, ask for advice. If the sling becomes loose, adjust it so that your forearm is level with the ground. Your shoulder should feel well supported.    Put an ice pack on the injured area for 20 minutes every 1 to 2 hours the first day. You can make your own ice pack by putting ice cubes in a plastic bag. A bag of frozen peas or something similar works well too. Wrap the bag in a thin towel. Continue with ice packs 3 to 4 times a day for the next 2 to 3 days. Then use the pack as needed to ease pain and swelling.    You may use acetaminophen or ibuprofen to control pain, unless another pain medicine was prescribed. If you have chronic liver or kidney disease, talk with your healthcare provider before using these medicines. Also talk with your provider if you ve had a stomach ulcer or gastrointestinal bleeding.    Shoulder joints become stiff if left in a sling for too long. You should start range of motion exercises about 7 to 10 days after the injury. Talk with your provider to find out what type of exercises to do and how soon to start.  Follow-up care  Follow up with your healthcare  provider, or as advised.  Any X-rays you had today don t show any broken bones, breaks, or fractures. Sometimes fractures don t show up on the first X-ray. Bruises and sprains can sometimes hurt as much as a fracture. These injuries can take time to heal completely. If your symptoms don t improve or they get worse, talk with your provider. You may need a repeat X-ray or other treatments.  When to seek medical advice  Call your healthcare provider right away if any of these occur:    Shoulder pain or swelling in your arm that gets worse    Fingers become cold, blue, numb, or tingly    Large amount of bruising of the shoulder or upper arm    Fever or chills  Date Last Reviewed: 8/1/2016 2000-2017 [employer]. [address] [place] [address]. All rights reserved. This information is not intended as a substitute for professional medical care. Always follow your healthcare professional's instructions.        Shoulder Exercises: Side Raise    This exercise stretches and strengthens your shoulders. Before starting, read through all the instructions. During the exercise, breathe normally and use smooth movements. Stop if you feel any pain. If pain persists, call your healthcare provider.    Stand straight, holding a ____ pound weight in each hand, arms at sides, feet shoulder-width apart.    Slowly extend your arms up and out until weights are at shoulder level. Slowly return to starting position.    Repeat ____ times. Do ____ sets ____ times a day.     CAUTION: Don t swing the weights or raise weights above shoulder level.   Date Last Reviewed: 8/16/2015 2000-2017 [employer]. [address] [place] [address]. All rights reserved. This information is not intended as a substitute for professional medical care. Always follow your healthcare professional's instructions.        Exercises for Shoulder Flexibility: Wall Walk    Improving your flexibility can reduce pain.  Stretching exercises also can help increase your range of pain-free motion. Breathe normally when you exercise. Use smooth, fluid movements.  Note: Follow any special instructions you are given. If you feel pain, stop the exercise. If the pain continues after stopping, call your healthcare provider:    Stand with your shoulder about 2 feet from the wall.    Raise your arm to shoulder level and gently  walk  your fingers up the wall as high as you can.    Hold for a few seconds. Then walk your fingers back down.    Repeat 3 times. Move closer to the wall as you repeat.    Build up to holding each stretch for 30 seconds.  Caution: Do this stretch only if your healthcare provider recommends it. Don t do it when you are first injured.       Date Last Reviewed: 8/16/2015 2000-2017 [employer]. [address] [place], [place] [id number]. All rights reserved. This information is not intended as a substitute for professional medical care. Always follow your healthcare professional's instructions.        Exercises for Shoulder Flexibility: External Rotation    This stretch can help restore shoulder flexibility and relieve pain over time. When stretching, be sure to breathe deeply. Follow any special instructions from your doctor or physical therapist:  1.  a doorway. Grasp the doorjamb with the hand on the frozen side. Your arm should be bent.  2. With the other hand, hold the elbow on the frozen side firmly against your body.  3. Standing in the same spot, rotate your body away from the doorjamb. Stop when you feel the stretch in the shoulder. At first, try to hold the stretch for 5 seconds.  4. Work up to doing 3 sets of this stretch, 3 times a day. Work up to holding the stretch for 30 to 60 seconds.  Note: Keep your arms as still as you can. Over time, rotate your body a little more to enhance the stretch. But be careful not to twist your back.  Frozen shoulder  Frozen shoulder is another name for  adhesive capsulitis, which causes restricted movement in the shoulder. If you have frozen shoulder, this stretch may cause discomfort, especially when you first get started. A few months may pass before you achieve the results you want. But once your shoulder heals, it rarely becomes frozen again. So stick to your stretching program. If you have any questions, be sure to ask your doctor.   Date Last Reviewed: 8/16/2015 2000-2017 [employer]. [address] [place] [address]. All rights reserved. This information is not intended as a substitute for professional medical care. Always follow your healthcare professional's instructions.        Shoulder Exercises: External Rotation    Strengthening exercises help make your injured shoulder more stable. To warm up, do flexibility (stretching) exercises first. Your healthcare provider will tell you what size hand weights to use for the strengthening exercise below. If you don t have hand weights, try using cans of soup instead:    Lie on your uninjured side with your head supported by a pillow or your arm. Place a small rolled-up towel under your top elbow.    Grasp a hand weight with your top hand and bend that arm to a right angle, resting your forearm against your stomach.    Keeping your elbow against the towel, slowly lift the weight until your forearm is slightly higher than your elbow. Return to the starting position. Repeat.    Work up to 5 to 15 lifts.  Date Last Reviewed: 8/16/2015 2000-2017 [employer]. [address] [place] [address]. All rights reserved. This information is not intended as a substitute for professional medical care. Always follow your healthcare professional's instructions.                Follow-ups after your visit        Your next 10 appointments already scheduled     Oct 18, 2017  9:20 AM CDT   Return Visit with Ilan Shen MD   [facility] ([facility])     "[address] [place] [id number] [phone]              Who to contact     If you have questions or need follow up information about today's clinic visit or your schedule please contact [facility] directly at [phone].  Normal or non-critical lab and imaging results will be communicated to you by MyChart, letter or phone within 4 business days after the clinic has received the results. If you do not hear from us within 7 days, please contact the clinic through [employer] or phone. If you have a critical or abnormal lab result, we will notify you by phone as soon as possible.  Submit refill requests through [employer] or call your pharmacy and they will forward the refill request to us. Please allow 3 business days for your refill to be completed.          Additional Information About Your Visit        MyChart Information     [employer] gives you secure access to your electronic health record. If you see a primary care provider, you can also send messages to your care team and make appointments. If you have questions, please call your primary care clinic.  If you do not have a primary care provider, please call [phone] and they will assist you.        Care EveryWhere ID     This is your Care EveryWhere ID. This could be used by other organizations to access your [facility] medical records  [id number]        Your Vitals Were     Temperature Height BMI (Body Mass Index)             97  F (36.1  C) 1.759 m (5' 9.25\") 22.72 kg/m2          Blood Pressure from Last 3 Encounters:   07/31/17 132/72   07/12/17 129/83   06/13/17 114/76    Weight from Last 3 Encounters:   08/09/17 70.3 kg (155 lb)   08/04/17 70.5 kg (155 lb 8 oz)   07/31/17 71.2 kg (157 lb)              Today, you had the following     No orders found for display       Primary Care Provider Office Phone # Fax #    Loreto Clement [phone] [address] [place] [address]        Equal Access to " Services     [facility]: Hadii aad ku hadroxanaalexsandra Sage, waaxda luqadaha, qaybta kaalmada lindsey, piero lopes . So [facility] [phone].    ATENCIÓN: Si ronny rutledge, tiene a nicholas disposición servicios gratuitos de asistencia lingüística. Llame al [phone].    We comply with applicable federal civil rights laws and [place] laws. We do not discriminate on the basis of race, color, national origin, age, disability sex, sexual orientation or gender identity.            Thank you!     Thank you for choosing [facility]  for your care. Our goal is always to provide you with excellent care. Hearing back from our patients is one way we can continue to improve our services. Please take a few minutes to complete the written survey that you may receive in the mail after your visit with us. Thank you!             Your Updated Medication List - Protect others around you: Learn how to safely use, store and throw away your medicines at www.disposemymeds.org.          This list is accurate as of: 8/9/17  1:54 PM.  Always use your most recent med list.                   Brand Name Dispense Instructions for use Diagnosis    ADVIL 200 MG capsule   Generic drug:  ibuprofen      Take 200 mg by mouth 3 times daily as needed        cholecalciferol 1000 UNIT tablet    vitamin D    90 tablet    Take 1 tablet (1,000 Units) by mouth daily    Low vitamin D level       lamoTRIgine 25 MG tablet    LaMICtal    180 tablet    Week 1: Take 2 tablets morning and 3 tablets evening. Week 2 and afterwards: Take 3 tablets two times a day    Seizure disorder (H)       meloxicam 15 MG tablet    MOBIC    30 tablet    Take 1 tablet (15 mg) by mouth daily    Acute pain of right shoulder

## 2017-10-02 DIAGNOSIS — G40.909 SEIZURE DISORDER (H): ICD-10-CM

## 2017-10-03 NOTE — TELEPHONE ENCOUNTER
lamoTRIgine (LAMICTAL) 25 MG tablet      Last Written Prescription Date: 7/23/17  Last Fill Quantity: 180,  # refills: 1   Last Office Visit with [facility], [facility] or [facility] prescribing provider: 7/12/17                                         Next 5 appointments (look out 90 days)     Oct 18, 2017  9:20 AM CDT   [facility] Visit with Ilan Shen MD   [facility] ([facility]    [address] [facility] [address]   [phone]                    Smiley Adams  [facility]

## 2017-10-09 NOTE — TELEPHONE ENCOUNTER
Reason for Call:  Other prescription    Detailed comments:  Patient calling on the status of this refill. He needs by noon today. Please call and advise.     Phone Number Patient can be reached at: Home number on file [phone] (home)    Best Time:  Any     Can we leave a detailed message on this number? YES    Call taken on 10/9/2017 at 9:28 AM by Ayde Engle

## 2017-10-12 NOTE — TELEPHONE ENCOUNTER
Reason for Call:  Other prescription    Detailed comments: Patient calling again to see whats going on with this prescription. Patient has waited over a week and still has not heard anything. Please advise. Patient frustrated this hasnt been taken care of.    Phone Number Patient can be reached at: Home number on file [phone] (home)    Best Time: any time    Can we leave a detailed message on this number? YES    Call taken on 10/12/2017 at 2:02 PM by Danyelle Barbour

## 2017-10-13 RX ORDER — LAMOTRIGINE 25 MG/1
75 TABLET ORAL 2 TIMES DAILY
Qty: 180 TABLET | Refills: 1 | Status: SHIPPED | OUTPATIENT
Start: 2017-10-13 | End: 2017-11-10

## 2017-10-18 ENCOUNTER — OFFICE VISIT (OUTPATIENT)
Dept: NEUROLOGY | Facility: CLINIC | Age: 42
End: 2017-10-18
Payer: COMMERCIAL

## 2017-10-18 VITALS
BODY MASS INDEX: 23.37 KG/M2 | HEART RATE: 69 BPM | HEIGHT: 69 IN | DIASTOLIC BLOOD PRESSURE: 89 MMHG | WEIGHT: 157.8 LBS | SYSTOLIC BLOOD PRESSURE: 144 MMHG

## 2017-10-18 DIAGNOSIS — Z87.898 H/O IDIOPATHIC SEIZURE: Primary | ICD-10-CM

## 2017-10-18 PROCEDURE — 99213 OFFICE O/P EST LOW 20 MIN: CPT | Performed by: PSYCHIATRY & NEUROLOGY

## 2017-10-18 NOTE — NURSING NOTE
"Chief Complaint   Patient presents with     RECHECK     Seizure/ medication       Initial /89 (BP Location: Right arm, Patient Position: Chair, Cuff Size: Adult Regular)  Pulse 69  Ht 1.759 m (5' 9.25\")  Wt 71.6 kg (157 lb 12.8 oz)  BMI 23.14 kg/m2 Estimated body mass index is 23.14 kg/(m^2) as calculated from the following:    Height as of this encounter: 1.759 m (5' 9.25\").    Weight as of this encounter: 71.6 kg (157 lb 12.8 oz).  Medication Reconciliation: complete   Jasmyn Vlaencia MA      "

## 2017-10-18 NOTE — MR AVS SNAPSHOT
After Visit Summary   10/18/2017    Claude Higgins    MRN: [id number]           Patient Information     Date Of Birth          1975        Visit Information        Provider Department      10/18/2017 9:20 AM Ilan Shen MD [facility]        Today's Diagnoses     H/O idiopathic seizure    -  1      Care Instructions     Reminded to get LAMOTRIGINE level soon. Blood sample for seizure medication (s) before the 1st dose in the morning (TROUGH LEVEL)             Reviewed [facility] regulations on seizures and driving with   her including:   A. NOT to operate a motor vehicle within 3 months following any seizure or  other episode with sudden unconsciousness or inability to sit up.     B. Requirement by the patient to report to Department of Motor Vehicle   (DMV) within 30  days after the event      C. To avoid any activities that might lead to self -injury or injury to others.   Such activities include operating power cutting tools, handling firearms,   climbing heights, exposure to vessels with hot cooking oil or water, and swimming alone.    Remember the importance of compliance with anticonvulsant medication    Reminded to document the frequency of the seizures    Advised to go to local hospital ER for acute emergencies/seizure   management  and arrange office follow-up for further review of medications    To call us for follow-up appointment after the work-up.                              Follow-ups after your visit        Follow-up notes from your care team     Return in about 4 weeks (around 11/15/2017) for Seizure.      Who to contact     If you have questions or need follow up information about today's clinic visit or your schedule please contact [facility] directly at [phone].  Normal or non-critical lab and imaging results will be communicated to you by MyChart, letter or phone within 4 business days after the clinic has received the results. If  "you do not hear from us within 7 days, please contact the clinic through Nepris or phone. If you have a critical or abnormal lab result, we will notify you by phone as soon as possible.  Submit refill requests through Nepris or call your pharmacy and they will forward the refill request to us. Please allow 3 business days for your refill to be completed.          Additional Information About Your Visit        OrderUpharSpazzles Information     Nepris gives you secure access to your electronic health record. If you see a primary care provider, you can also send messages to your care team and make appointments. If you have questions, please call your primary care clinic.  If you do not have a primary care provider, please call [phone] and they will assist you.        Care EveryWhere ID     This is your Care EveryWhere ID. This could be used by other organizations to access your [facility] medical records  [id number]        Your Vitals Were     Pulse Height BMI (Body Mass Index)             69 1.759 m (5' 9.25\") 23.14 kg/m2          Blood Pressure from Last 3 Encounters:   10/18/17 144/89   07/31/17 132/72   07/12/17 129/83    Weight from Last 3 Encounters:   10/18/17 71.6 kg (157 lb 12.8 oz)   08/09/17 70.3 kg (155 lb)   08/04/17 70.5 kg (155 lb 8 oz)              Today, you had the following     No orders found for display       Primary Care Provider Office Phone # Fax #    Loreto Merced Clement [phone]       XXX RESIGNED XXX [address] [place] [address]        Equal Access to Services     [facility]: Hadii aad ku hadasho Soomaali, waaxda luqadaha, qaybta kaalmada adeegdavid, piero lopes . So [facility] [phone].    ATENCIÓN: Si habla español, tiene a nicholas disposición servicios gratuitos de asistencia lingüística. Llame al [phone].    We comply with applicable federal civil rights laws and [place] laws. We do not discriminate on the basis of race, color, " national origin, age, disability, sex, sexual orientation, or gender identity.            Thank you!     Thank you for choosing [facility]  for your care. Our goal is always to provide you with excellent care. Hearing back from our patients is one way we can continue to improve our services. Please take a few minutes to complete the written survey that you may receive in the mail after your visit with us. Thank you!             Your Updated Medication List - Protect others around you: Learn how to safely use, store and throw away your medicines at www.disposemymeds.org.          This list is accurate as of: 10/18/17  9:59 AM.  Always use your most recent med list.                   Brand Name Dispense Instructions for use Diagnosis    ADVIL 200 MG capsule   Generic drug:  ibuprofen      Take 200 mg by mouth 3 times daily as needed        cholecalciferol 1000 UNIT tablet    vitamin D    90 tablet    Take 1 tablet (1,000 Units) by mouth daily    Low vitamin D level       lamoTRIgine 25 MG tablet    LaMICtal    180 tablet    Take 3 tablets (75 mg) by mouth 2 times daily Please call him today    Seizure disorder (H)       meloxicam 15 MG tablet    MOBIC    30 tablet    Take 1 tablet (15 mg) by mouth daily    Acute pain of right shoulder

## 2017-10-21 NOTE — PROGRESS NOTES
"                           ESTABLISHED PATIENT NEUROLOGY NOTE    LOCATION: [facility]  DATE OF VISIT: 10/18/2017    NAME: Mr.Aaron Higgins  : 1975 (42 year old)  MR #: [id number]    PRIMARY/REFERRING PROVIDER: Loreto Clement MD    REASON FOR VISIT: Seizures    HISTORY OF PRESENT ILLNESS: 42-year-old man with recent diagnosis of seizures.  He was initially started on Keppra.  Keppra give him side effect and therefore he was switched to lamotrigine.  His last lamotrigine level was low.  He was advised to increase his lamotrigine and have the level checked in following few weeks.  Apparently was taking less dose of lamotrigine because he did not have the enough lamotrigine tablets at one time and therefore he decided not to have the future lamotrigine blood level done.  Now he has the medication and he would like to get it done in next few days.  Denies any side effects with lamotrigine.   No seizures reported in the interval period.    He has been drinking ethanol excessively previously.  He relates that he is not drinking any alcohol now.  Previously his liver enzyme GGT was raised.    FAMILY HISTORY: No FH of neurological disease  Recent Diagnostic Studies:     Blood tests:   Component      Latest Ref Rng & Units 2017   GGT      0 - 75 U/L 89 (H)    Lamotrigine Level        1.5 (L)     Allergies   Allergen Reactions     Penicillin G Other (See Comments)     \"i am given a generic PCN every 5 yr or so when I have strep throat and I do fine with that. I don't think I am allergic to PCN.     Current Outpatient Prescriptions   Medication Sig     lamoTRIgine (LAMICTAL) 25 MG tablet Take 3 tablets (75 mg) by mouth 2 times daily Please call him today     meloxicam (MOBIC) 15 MG tablet Take 1 tablet (15 mg) by mouth daily     cholecalciferol (VITAMIN D) 1000 UNIT tablet Take 1 tablet (1,000 Units) by mouth daily     ibuprofen (ADVIL) 200 MG capsule Take 200 mg by mouth 3 times daily as " "needed     No current facility-administered medications for this visit.        Current Outpatient Prescriptions on File Prior to Visit:  lamoTRIgine (LAMICTAL) 25 MG tablet Take 3 tablets (75 mg) by mouth 2 times daily Please call him today   meloxicam (MOBIC) 15 MG tablet Take 1 tablet (15 mg) by mouth daily   cholecalciferol (VITAMIN D) 1000 UNIT tablet Take 1 tablet (1,000 Units) by mouth daily   ibuprofen (ADVIL) 200 MG capsule Take 200 mg by mouth 3 times daily as needed     No current facility-administered medications on file prior to visit.   Past Medical History:   Diagnosis Date     Alcohol use     Out-patient chemical dependancy treatment in 2003     GERD (gastroesophageal reflux disease)      Inguinal hernia 2015    left     Tobacco abuse      Past Surgical History:   Procedure Laterality Date     HERNIA REPAIR, INGUINAL RT/LT Right 1976     HERNIORRHAPHY INGUINAL Left 7/10/2015    Procedure: HERNIORRHAPHY INGUINAL;  Surgeon: Vicente Aleman MD;  Location: [facility] OR     SURGICAL HISTORY OF -  Right 7/28/2003-04    Tib/fib, ORIF, bone and skin grafting and arterial bypass     SURGICAL HISTORY OF -  Right 2008    Tib/fib, hardware removal.      TESTICLE SURGERY Right 1993    Torsion correction, and prev. undescended     PERSONAL & SOCIAL HISTORY Reviewed and documented in [facility]    GENERAL EXAMINATION: /89 (BP Location: Right arm, Patient Position: Chair, Cuff Size: Adult Regular)  Pulse 69  Ht 1.759 m (5' 9.25\")  Wt 71.6 kg (157 lb 12.8 oz)  BMI 23.14 kg/m2    IMPRESSION:   Encounter Diagnoses   Name Primary?     H/O idiopathic seizure Yes     COMMENTS: Will monitor Lamotrigine level and adjust the dose as needed.   PLANS:   Patient Instructions    Reminded to get LAMOTRIGINE level soon. Blood sample for seizure medication (s) before the 1st dose in the morning (TROUGH LEVEL)             Reviewed [place] state regulations on seizures and driving with   her including:   A. NOT to operate a motor " vehicle within 3 months following any seizure or  other episode with sudden unconsciousness or inability to sit up.     B. Requirement by the patient to report to Department of Motor Vehicle   (DMV) within 30  days after the event      C. To avoid any activities that might lead to self -injury or injury to others.   Such activities include operating power cutting tools, handling firearms,   climbing heights, exposure to vessels with hot cooking oil or water, and swimming alone.    Remember the importance of compliance with anticonvulsant medication    Reminded to document the frequency of the seizures    Advised to go to local hospital ER for acute emergencies/seizure   management  and arrange office follow-up for further review of medications    To call us for follow-up appointment after the work-up.      Thanks to Loreto Clement MD for allowing me to participate in Mr. Higgins's care. Please feel free to call me with any questions or concerns.       *Chart documentation was completed in part with Dragon voice-recognition software. Even though reviewed, some grammatical, spelling, and word errors may remain.     Ilan Shen MD, [facility]  Neurologist    Cc: Loreto Clement MD

## 2017-10-27 DIAGNOSIS — G40.909 SEIZURE DISORDER (H): ICD-10-CM

## 2017-10-27 PROCEDURE — 99000 SPECIMEN HANDLING OFFICE-LAB: CPT | Performed by: PSYCHIATRY & NEUROLOGY

## 2017-10-27 PROCEDURE — 80175 DRUG SCREEN QUAN LAMOTRIGINE: CPT | Mod: 90 | Performed by: PSYCHIATRY & NEUROLOGY

## 2017-10-28 LAB — LAMOTRIGINE SERPL-MCNC: 2.8 UG/ML (ref 2.5–15)

## 2017-10-29 NOTE — PROGRESS NOTES
nÁgel Mr. Higgins,     Your lamotrigine level is in the lower side of the acceptable range.  No changes in medication suggested this time but will have to adjust your medication if you develop any seizures.  Would prefer you seeing me in 3-4 weeks' time.        Thanks,       Ilan Shen MD, [facility]  Neurologist

## 2017-11-09 ENCOUNTER — TELEPHONE (OUTPATIENT)
Dept: NEUROLOGY | Facility: CLINIC | Age: 42
End: 2017-11-09

## 2017-11-09 DIAGNOSIS — G40.909 SEIZURE DISORDER (H): Primary | ICD-10-CM

## 2017-11-09 NOTE — TELEPHONE ENCOUNTER
Reason for call:  Other   Patient called regarding (reason for call): appointment and call back  Additional comments: Patient would like a call back; he wants refills until he finds a new provider. Is very upset.      Phone number to reach patient:  Home number on file [phone] (home)    Best Time:  ASAP    Can we leave a detailed message on this number?  YES

## 2017-11-09 NOTE — TELEPHONE ENCOUNTER
Medication Detail      Disp Refills Start End ALFRED   lamoTRIgine (LAMICTAL) 25 MG tablet 180 tablet 1 10/13/2017  No   Sig: Take 3 tablets (75 mg) by mouth 2 times daily Please call him today   Class: E-Prescribe   Route: Oral   Order: [id number]   E-Prescribing Status: Receipt confirmed by pharmacy (10/13/2017  6:18 PM CDT)     Based on the above prescription he has supply of LAMOTRIGINE till. Apri, 2018.  He should be able to find neurologist. He can establish care with my colleagues DR. Arechiga or Dr. Carmen.  His PRIMARY CARE PROVIDER Loreto Clement MD can provide prescription in situation like this. She is very caring and helpful.     FYI: Dr. Clement      Please explain to him.

## 2017-11-09 NOTE — TELEPHONE ENCOUNTER
Called the pharmacy and verified patient only has one more refill remaining, he does not have enough medication to last until April 2018. Patient is aware of this and he is wondering if he can at least get one more until he can get in with another provider, he says some neurologist are booked out for 4 months or more and he is concerned about running out of medication while he waits. Please advise  Jasmyn Valencia MA

## 2017-11-09 NOTE — TELEPHONE ENCOUNTER
Called and spoke with patient and he express concern with running out of medication, while in the process of trying to find a new neurologist, he is asking for an additional months worth of medication or more, which will give him time to get in with some where else  Jasmyn Valencia MA

## 2017-11-10 RX ORDER — LAMOTRIGINE 25 MG/1
75 TABLET ORAL 2 TIMES DAILY
Qty: 540 TABLET | Refills: 0 | Status: SHIPPED | OUTPATIENT
Start: 2017-12-12 | End: 2018-01-26

## 2017-11-10 NOTE — TELEPHONE ENCOUNTER
Called and spoke with patient and gave him the message below. Informed patient that Dr. Shen did refill his med but it doesn't start til 12/12/2017. Patient should have enough of his medication so he can establish care with a new provider and he won't run out. Patient is going to call [facility] with one of Dr. Shen's colleagues. Patient was just worried about running out of his medication.  Keli Diaz MA

## 2017-11-10 NOTE — TELEPHONE ENCOUNTER
Please let him know:  Lamotrigine level week beginning on 11/27/2017.  Blood draw before morning dose of lamotrigine. Will let him know test results and changes needed if any.  3 months prescription written starting from December 2017.  Establish care with new primary care provider to coordinate his care.  Establish care with one of the [place] neurologists Dr. Tammie Arechiga and or .

## 2018-01-24 DIAGNOSIS — G40.909 SEIZURE DISORDER (H): ICD-10-CM

## 2018-01-24 PROCEDURE — 80175 DRUG SCREEN QUAN LAMOTRIGINE: CPT | Mod: 90 | Performed by: PSYCHIATRY & NEUROLOGY

## 2018-01-24 PROCEDURE — 99000 SPECIMEN HANDLING OFFICE-LAB: CPT | Performed by: PSYCHIATRY & NEUROLOGY

## 2018-01-25 ENCOUNTER — PRE VISIT (OUTPATIENT)
Dept: NEUROLOGY | Facility: CLINIC | Age: 43
End: 2018-01-25

## 2018-01-25 LAB — LAMOTRIGINE SERPL-MCNC: 3.1 UG/ML (ref 2.5–15)

## 2018-01-25 NOTE — TELEPHONE ENCOUNTER
[facility] CLINICAL DOCUMENTATION    Pre-Visit Planning   PREVISIT INFORMATION                                                    Claude Higgins scheduled for future visit at [facility] specialty clinics.    Patient is scheduled to see Dr. Fischer (provider) on 1/26 (date)  Reason for visit: Seizures  Referring provider Pre [facility] pt  Has patient seen previous specialist? Yes previous [facility] pt.   Medical Records:  in epic    REVIEW                                                      New patient packet mailed to patient: Yes  Medication reconciliation complete: Yes      Current Outpatient Prescriptions   Medication Sig Dispense Refill     lamoTRIgine (LAMICTAL) 25 MG tablet Take 3 tablets (75 mg) by mouth 2 times daily Please call him today 540 tablet 0     meloxicam (MOBIC) 15 MG tablet Take 1 tablet (15 mg) by mouth daily 30 tablet 2     cholecalciferol (VITAMIN D) 1000 UNIT tablet Take 1 tablet (1,000 Units) by mouth daily 90 tablet 3     ibuprofen (ADVIL) 200 MG capsule Take 200 mg by mouth 3 times daily as needed         Allergies: Penicillin g    (insert provider dot-phrase for provider specific visit requirements)    PLAN/FOLLOW-UP NEEDED                                                      Previsit review complete.  Patient will see provider at future scheduled appointment.     Patient Reminders Given:  Please, make sure you bring an updated list of your medications.   If you are having a procedure, please, present 15 minutes early.  If you need to cancel or reschedule,please call [phone].    Minda Ibrahim

## 2018-01-26 ENCOUNTER — OFFICE VISIT (OUTPATIENT)
Dept: NEUROLOGY | Facility: CLINIC | Age: 43
End: 2018-01-26
Payer: COMMERCIAL

## 2018-01-26 VITALS
BODY MASS INDEX: 22.4 KG/M2 | OXYGEN SATURATION: 99 % | HEIGHT: 71 IN | SYSTOLIC BLOOD PRESSURE: 122 MMHG | DIASTOLIC BLOOD PRESSURE: 81 MMHG | WEIGHT: 160 LBS | HEART RATE: 71 BPM | TEMPERATURE: 97.5 F

## 2018-01-26 DIAGNOSIS — R56.9 CONVULSIONS, UNSPECIFIED CONVULSION TYPE (H): Primary | ICD-10-CM

## 2018-01-26 DIAGNOSIS — G40.909 SEIZURE DISORDER (H): ICD-10-CM

## 2018-01-26 PROCEDURE — 99213 OFFICE O/P EST LOW 20 MIN: CPT | Performed by: PSYCHIATRY & NEUROLOGY

## 2018-01-26 RX ORDER — LAMOTRIGINE 25 MG/1
75 TABLET ORAL 2 TIMES DAILY
Qty: 180 TABLET | Refills: 2 | Status: SHIPPED | OUTPATIENT
Start: 2018-01-26 | End: 2018-03-30

## 2018-01-26 NOTE — LETTER
"    2018         RE: Claude Higgins  [address]   [facility] [address]        Dear Colleague,    Thank you for referring your patient, Claude Higgins, to the [facility]. Please see a copy of my visit note below.    NEUROLOGY HISTORY AND PHYSICAL EXAM  [facility]    Patient:Claude Higgins  : 1975  Age: 43 year old  Today's Office Visit: 2018       INTRODUCTION:  The patient is a 43-year-old right-handed male with a history of seizures who was following with Dr. Shen and transferring care to this clinic.      HISTORY OF PRESENT ILLNESS:  The patient's seizures started 2016.  He had 3 seizures in his life, the last one was 10/2016.  All his seizures were alcohol related.  He states he did binge drink the night before all his seizures.  He describes his seizure as below, he has had no auras, and \"I just lose consciousness.\"  Witnesses have reported shaking all over, making noises, and stiffening up.  He had urinary incontinence with his last seizure, denies tongue biting, major injuries or car accidents.  He tried levetiracetam, but he states \"it turned me into a zombie and slowed down my brain.\"  Then he was started on lamotrigine.  He is currently taking lamotrigine 75 mg twice a day.  With the last increase he had some blotches but it resolved.  His last lamotrigine level on 2018 was 3.1.      RISK FACTORS FOR EPILEPSY:  He denies  or prenatal complications.  No developmental delay, no history of febrile seizures, meningitis, encephalitis or significant head injury.  His niece has seizures.      PREVIOUS TESTING FOR EPILEPSY:  An EEG on 10/26/2016 was a normal awake and sleep EEG.      MEDICATIONS:  Lamotrigine 75 mg b.i.d., cholecalciferol 1000 units daily.      SOCIAL AND EDUCATIONAL HISTORY:  He attended regular classes and graduated high school.  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  He is not , has no kids.  He has cut " down on his drinking since his last seizure.  He still may drink a couple beers a week.  He smokes 1 pack a day and he uses marijuana almost every night to help him sleep.        PAST MEDICAL HISTORY:  History of ADHD as a child.      REVIEW OF SYSTEMS:  Complete review of system was done and was positive for a rash which developed with the last increase in the lamotrigine dose, which resolved, memory loss (attributes to seizures), and sexual difficulties.      PHYSICAL EXAMINATION:   VITAL SIGNS:  Blood pressure 122/81, pulse 71, weight 72.6 kilograms, height 1.79.   GENERAL APPEARANCE:  Alert, awake, cooperative, in no apparent distress.   NEUROLOGIC:     Mental status:  Alert and oriented.     Language/speech:  No aphasia or dysarthria.   Cranial nerves:  Pupils are round and reactive to light.  Extraocular movements are intact.  Facial sensation is intact to light touch.  Face is symmetric.  Shrug shoulder is normal.  Tongue is midline.   Motor Exam:  Normal tone, bulk, and strength 5/5.   Sensation:  Intact to light touch.   Coordination:  Normal finger-to-nose.    Reflexes:  DTRs 1+ symmetric.  Toes are downgoing.   Gait and tandem gait are steady.      ASSESSMENT:  A 43-year-old male with convulsions between August to October 2010 related to binge drinking.  Since the last seizure he cut down significantly on his drinking and stopped binge drinking and he has not had any more seizures.  He has been on lamotrigine 75 mg twice a day.  He would like to come off of lamotrigine.  His EEG was normal.  I suggested repeating an EEG and doing a brain MRI.  If these came back normal, will consider tapering him off lamotrigine if he continues to be careful about drinking.  He should understand there would be a potential risk for seizure recurrence.  I also counseled him about quitting marijuana use.  He states he uses marijuana to shut his brain off and sleep.  I discussed the possibility of anxiety, and that he would  need to be evaluated for that.     PLAN:   1.  Continue lamotrigine 75 mg b.i.d.   2.  3-hour video-EEG.   3.  3[employer] brain MRI with seizure protocol.   4.  Return to clinic in 2 months.         LORENZA LEY MD             D: 2018   T: 2018   MT: MARIE      Name:     MIRELLA PENALOZA   MRN:      [id number]        Account:      [id number]   :      1975           Visit Date:   2018      Document: [id number]        Again, thank you for allowing me to participate in the care of your patient.        Sincerely,        Lorenza Ley MD

## 2018-01-26 NOTE — NURSING NOTE
"Claude Higgins's goals for this visit include: consult  He requests these members of his care team be copied on today's visit information:     PCP: Loreto Clement    Referring Provider:  No referring provider defined for this encounter.    Chief Complaint   Patient presents with     Consult       Initial /81  Pulse 71  Temp 97.5  F (36.4  C) (Oral)  Ht 1.791 m (5' 10.5\")  Wt 72.6 kg (160 lb)  SpO2 99%  BMI 22.63 kg/m2 Estimated body mass index is 22.63 kg/(m^2) as calculated from the following:    Height as of this encounter: 1.791 m (5' 10.5\").    Weight as of this encounter: 72.6 kg (160 lb).  Medication Reconciliation: complete    "

## 2018-01-26 NOTE — PROGRESS NOTES
"NEUROLOGY HISTORY AND PHYSICAL EXAM  [facility]    Patient:Claude Higgins  : 1975  Age: 43 year old  Today's Office Visit: 2018       INTRODUCTION:  The patient is a 43-year-old right-handed male with a history of seizures who was following with Dr. Shen and transferring care to this clinic.      HISTORY OF PRESENT ILLNESS:  The patient's seizures started 2016.  He had 3 seizures in his life, the last one was 10/2016.  All his seizures were alcohol related.  He states he did binge drink the night before all his seizures.  He describes his seizure as below, he has had no auras, and \"I just lose consciousness.\"  Witnesses have reported shaking all over, making noises, and stiffening up.  He had urinary incontinence with his last seizure, denies tongue biting, major injuries or car accidents.  He tried levetiracetam, but he states \"it turned me into a zombie and slowed down my brain.\"  Then he was started on lamotrigine.  He is currently taking lamotrigine 75 mg twice a day.  With the last increase he had some blotches but it resolved.  His last lamotrigine level on 2018 was 3.1.      RISK FACTORS FOR EPILEPSY:  He denies  or prenatal complications.  No developmental delay, no history of febrile seizures, meningitis, encephalitis or significant head injury.  His niece has seizures.      PREVIOUS TESTING FOR EPILEPSY:  An EEG on 10/26/2016 was a normal awake and sleep EEG.      MEDICATIONS:  Lamotrigine 75 mg b.i.d., cholecalciferol 1000 units daily.      SOCIAL AND EDUCATIONAL HISTORY:  He attended regular classes and graduated high school.  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  He is not , has no kids.  He has cut down on his drinking since his last seizure.  He still may drink a couple beers a week.  He smokes 1 pack a day and he uses marijuana almost every night to help him sleep.        PAST MEDICAL HISTORY:  History of ADHD as a child.    "   REVIEW OF SYSTEMS:  Complete review of system was done and was positive for a rash which developed with the last increase in the lamotrigine dose, which resolved, memory loss (attributes to seizures), and sexual difficulties.      PHYSICAL EXAMINATION:   VITAL SIGNS:  Blood pressure 122/81, pulse 71, weight 72.6 kilograms, height 1.79.   GENERAL APPEARANCE:  Alert, awake, cooperative, in no apparent distress.   NEUROLOGIC:     Mental status:  Alert and oriented.     Language/speech:  No aphasia or dysarthria.   Cranial nerves:  Pupils are round and reactive to light.  Extraocular movements are intact.  Facial sensation is intact to light touch.  Face is symmetric.  Shrug shoulder is normal.  Tongue is midline.   Motor Exam:  Normal tone, bulk, and strength 5/5.   Sensation:  Intact to light touch.   Coordination:  Normal finger-to-nose.    Reflexes:  DTRs 1+ symmetric.  Toes are downgoing.   Gait and tandem gait are steady.      ASSESSMENT:  A 43-year-old male with convulsions between August to October 2016 related to binge drinking.  Since the last seizure he cut down significantly on his drinking and stopped binge drinking and he has not had any more seizures.  He has been on lamotrigine 75 mg twice a day.  He would like to come off of lamotrigine.  His EEG was normal.  I suggested repeating an EEG and doing a brain MRI.  If these came back normal, will consider tapering him off lamotrigine if he continues to be careful about drinking.  He should understand there would be a potential risk for seizure recurrence.  I also counseled him about quitting marijuana use.  He states he uses marijuana to shut his brain off and sleep.  I discussed the possibility of anxiety, and that he would need to be evaluated for that.     PLAN:   1.  Continue lamotrigine 75 mg b.i.d.   2.  3-hour video-EEG.   3.  3[employer] brain MRI with seizure protocol.   4.  Return to clinic in 2 months.         VENUS LEY MD             D:  2018   T: 2018   MT: MARIE      Name:     MIRELLA PENALOZA   MRN:      [id number]        Account:      [id number]   :      1975           Visit Date:   2018      Document: [id number]

## 2018-02-01 ENCOUNTER — TRANSFERRED RECORDS (OUTPATIENT)
Dept: HEALTH INFORMATION MANAGEMENT | Facility: CLINIC | Age: 43
End: 2018-02-01

## 2018-02-01 ENCOUNTER — ALLIED HEALTH/NURSE VISIT (OUTPATIENT)
Dept: NEUROLOGY | Facility: CLINIC | Age: 43
End: 2018-02-01
Payer: COMMERCIAL

## 2018-02-01 DIAGNOSIS — R56.9 CONVULSIONS, UNSPECIFIED CONVULSION TYPE (H): ICD-10-CM

## 2018-02-01 NOTE — PROGRESS NOTES
CPT: 67681-78  [facility]  Dr Aaliyah guerra

## 2018-02-01 NOTE — MR AVS SNAPSHOT
After Visit Summary   2/1/2018    Claude Higgins    MRN: [id number]           Patient Information     Date Of Birth          1975        Visit Information        Provider Department      2/1/2018 8:30 AM [facility] EEG 2 [facility]        Today's Diagnoses     Convulsions, unspecified convulsion type (H)           Follow-ups after your visit        Your next 10 appointments already scheduled     Mar 30, 2018  8:00 AM CDT   Return Visit with Lorenza Hinson MD   [facility] ([facility])    [address] [place] [address] [phone]              Who to contact     Please call your clinic at [phone] to:    Ask questions about your health    Make or cancel appointments    Discuss your medicines    Learn about your test results    Speak to your doctor   If you have compliments or concerns about an experience at your clinic, or if you wish to file a complaint, please contact [facility] Patient Relations at [phone] or email us at [email or web address]         Additional Information About Your Visit        MyChart Information     [employer] gives you secure access to your electronic health record. If you see a primary care provider, you can also send messages to your care team and make appointments. If you have questions, please call your primary care clinic.  If you do not have a primary care provider, please call [phone] and they will assist you.      Predictvia is an electronic gateway that provides easy, online access to your medical records. With Predictvia, you can request a clinic appointment, read your test results, renew a prescription or communicate with your care team.     To access your existing account, please contact your [facility] or call [phone] for assistance.        Care EveryWhere ID     This is your Care EveryWhere ID. This could be used by  other organizations to access your [place] medical records  [id number]         Blood Pressure from Last 3 Encounters:   01/26/18 122/81   10/18/17 144/89   07/31/17 132/72    Weight from Last 3 Encounters:   01/26/18 160 lb (72.6 kg)   10/18/17 157 lb 12.8 oz (71.6 kg)   08/09/17 155 lb (70.3 kg)              We Performed the Following     CHARGE: Video EEG  < 12 hours (61984-07)     ORDER:  EEG video monitoring        Primary Care Provider Office Phone # Fax #    Loreto Merced Clement [phone]       XXX RESIGNED XXX [address] [place] [address]        Equal Access to Services     [facility] Center[facility] : Mehul cortezo Sonaomi, waaxda luqadaha, qaybta kaalmada adeegyada, piero chisholm. So [facility] [phone].    ATENCIÓN: Si habla español, tiene a nicholas disposición servicios gratuitos de asistencia lingüística. [facility] [phone].    We comply with applicable federal civil rights laws and [place] laws. We do not discriminate on the basis of race, color, national origin, age, disability, sex, sexual orientation, or gender identity.            Thank you!     Thank you for choosing [facility] EPI[facility]  for your care. Our goal is always to provide you with excellent care. Hearing back from our patients is one way we can continue to improve our services. Please take a few minutes to complete the written survey that you may receive in the mail after your visit with us. Thank you!             Your Updated Medication List - Protect others around you: Learn how to safely use, store and throw away your medicines at www.disposemymeds.org.          This list is accurate as of 2/1/18  2:16 PM.  Always use your most recent med list.                   Brand Name Dispense Instructions for use Diagnosis    ADVIL 200 MG capsule   Generic drug:  ibuprofen      Take 200 mg by mouth 3 times daily as needed        cholecalciferol 1000 UNIT tablet    vitamin D3    90 tablet     Take 1 tablet (1,000 Units) by mouth daily    Low vitamin D level       lamoTRIgine 25 MG tablet    LaMICtal    180 tablet    Take 3 tablets (75 mg) by mouth 2 times daily Please call him today    Seizure disorder (H)

## 2018-03-20 ENCOUNTER — HOSPITAL ENCOUNTER (EMERGENCY)
Facility: CLINIC | Age: 43
Discharge: HOME | End: 2018-03-20
Attending: FAMILY MEDICINE | Admitting: FAMILY MEDICINE
Payer: COMMERCIAL

## 2018-03-20 VITALS
OXYGEN SATURATION: 95 % | HEART RATE: 76 BPM | TEMPERATURE: 96.8 F | BODY MASS INDEX: 25.18 KG/M2 | HEIGHT: 69 IN | RESPIRATION RATE: 17 BRPM | SYSTOLIC BLOOD PRESSURE: 125 MMHG | DIASTOLIC BLOOD PRESSURE: 88 MMHG | WEIGHT: 170 LBS

## 2018-03-20 DIAGNOSIS — T15.01XA FOREIGN BODY OF RIGHT CORNEA, INITIAL ENCOUNTER: ICD-10-CM

## 2018-03-20 PROCEDURE — 99283 EMERGENCY DEPT VISIT LOW MDM: CPT | Mod: 25 | Performed by: FAMILY MEDICINE

## 2018-03-20 PROCEDURE — 99284 EMERGENCY DEPT VISIT MOD MDM: CPT | Mod: 25 | Performed by: FAMILY MEDICINE

## 2018-03-20 PROCEDURE — 65205 REMOVE FOREIGN BODY FROM EYE: CPT | Mod: RT | Performed by: FAMILY MEDICINE

## 2018-03-20 PROCEDURE — 25000125 ZZHC RX 250: Performed by: FAMILY MEDICINE

## 2018-03-20 RX ORDER — TETRACAINE HYDROCHLORIDE 5 MG/ML
2 SOLUTION OPHTHALMIC ONCE
Status: COMPLETED | OUTPATIENT
Start: 2018-03-20 | End: 2018-03-20

## 2018-03-20 RX ORDER — POLYMYXIN B SULFATE AND TRIMETHOPRIM 1; 10000 MG/ML; [USP'U]/ML
1-2 SOLUTION OPHTHALMIC
Qty: 2 ML | Refills: 0 | Status: SHIPPED | OUTPATIENT
Start: 2018-03-20 | End: 2018-03-23

## 2018-03-20 RX ADMIN — TETRACAINE HYDROCHLORIDE 2 DROP: 5 SOLUTION OPHTHALMIC at 19:15

## 2018-03-20 ASSESSMENT — VISUAL ACUITY
OD: 20/30
OS: 20/25

## 2018-03-20 NOTE — ED AVS SNAPSHOT
[facility] Emergency Department    [address] [place] [address]    Phone:  [phone]    Fax:  [phone]                                       Claude Higgins   MRN: [id number]    Department:  [facility] Emergency Department   Date of Visit:  3/20/2018           After Visit Summary Signature Page     I have received my discharge instructions, and my questions have been answered. I have discussed any challenges I see with this plan with the nurse or doctor.    ..........................................................................................................................................  Patient/Patient Representative Signature      ..........................................................................................................................................  Patient Representative Print Name and Relationship to Patient    ..................................................               ................................................  Date                                            Time    ..........................................................................................................................................  Reviewed by Signature/Title    ...................................................              ..............................................  Date                                                            Time

## 2018-03-20 NOTE — ED NOTES
Was cutting steel 2 nights ago and feels like something is still in his right eye.  States pain comes and goes in the eye.  Eye is red, sensitive to light, painful.  Has tried flushing his eye last night and it was crusty this morning so he flushed again.  This afternoon it started hurting again

## 2018-03-20 NOTE — ED AVS SNAPSHOT
" [facility] Emergency Department    [address] [place] [address]    Phone:  [phone]    Fax:  [phone]                                       Claude Higgins   MRN: [id number]    Department:  [facility] Emergency Department   Date of Visit:  3/20/2018           Patient Information     Date Of Birth          1975        Your diagnoses for this visit were:     Foreign body of right cornea        You were seen by Annabelle Sapp MD.      Follow-up Information     Follow up with Your primary clinic provider or return to the emergency department In 1 day.    Why:  if not improving        Discharge Instructions       Thank you for giving us the opportunity to see you. The impression is that you have a corneal foreign body that was with a bur.    Use Polytrim eyedrops 1-2 drops in the right eye every 4 hours while awake for the next 3 days.    Follow-up in the next 24-48 hours if you have a persistent foreign body sensation.    Your tetanus is up-to-date given in 2015.    After discharge, please closely monitor for any new or worsening symptoms. Return to the Emergency Department at any time if your symptoms worsen.        Particle Removed from Eye (Corneal Foreign Body)    A particle got into your eye and stuck to the cornea (the clear part in the front of the eye). Your healthcare provider has removed this particle. The cornea is very sensitive and may still hurt for another 1 to 2 days while it heals.  If a metal particle was in your eye, a \"rust ring\" may have formed. This may require a second visit for complete removal.  Your healthcare provider may have put an eye patch on your eye. This may help the healing process. But you also may not receive an eye patch. For several types of injuries to the cornea, they are not recommended.  Home care    You may wear sunglasses to help reduce symptoms while the eye is healing, unless advised otherwise by your healthcare " provider.    You may use acetaminophen or ibuprofen to control pain, unless another pain medicine was prescribed. (Note: If you have chronic liver or kidney disease, or if you have ever had a stomach ulcer or gastrointestinal bleeding, talk with your healthcare provider before using these medicines.)    If you received an eye patch:    It should not be left in place for more than 24 hours, unless advised otherwise by your healthcare provider.    Always keep your return appointment for patch removal and re-exam. Your eye could be harmed if the patch remains in place longer than advised.    Do not drive a motor vehicle or operate machinery with the patch in place. You will have trouble judging distances with only one eye.    If eye drops or ointment were prescribed, use as directed.  Follow-up care    No eye patch: If no patch was used, but the pain continues for more than 48 hours, you should have another eye exam.    Eye patch: If your eye was patched and you were given a return appointment for patch removal and re-exam, do not miss the visit. Again, it could be harmful to your eye if the patch remains in place longer than advised. However, if you were asked to remove the eye patch within 24 hours (or as directed by your healthcare provider), contact your healthcare provider right away if your pain increases or get worse after removal of the eye patch.  When to seek medical advice  Call your healthcare provider right away if any of these occur:    Increasing eye pain or pain that does not improve after 24 hours    Discharge from the eye    Redness of the eye or swelling of the eyelids    Worsening vision  Date Last Reviewed: 2/24/2017 2000-2017 [employer]. [address] [place] [address], [place] [address]. All rights reserved. This information is not intended as a substitute for professional medical care. Always follow your healthcare professional's instructions.          Future Appointments         Provider Department Dept Phone Center    3/30/2018 8:00 AM Lorenza Hinson MD [facility] [phone] [place]      24 Hour Appointment Hotline       To make an appointment at any [facility], call [phone] ([phone]). If you don't have a family doctor or clinic, we will help you find one. [facility] are conveniently located to serve the needs of you and your family.             Review of your medicines      START taking        Dose / Directions Last dose taken    trimethoprim-polymyxin b ophthalmic solution   Commonly known as:  POLYTRIM   Dose:  1-2 drop   Quantity:  2 mL        Apply 1-2 drops to eye every 4 hours (while awake) for 3 days   Refills:  0          Our records show that you are taking the medicines listed below. If these are incorrect, please call your family doctor or clinic.        Dose / Directions Last dose taken    ADVIL 200 MG capsule   Dose:  200 mg   Generic drug:  ibuprofen        Take 200 mg by mouth 3 times daily as needed   Refills:  0        cholecalciferol 1000 UNIT tablet   Commonly known as:  vitamin D3   Dose:  1000 Units   Quantity:  90 tablet        Take 1 tablet (1,000 Units) by mouth daily   Refills:  3        lamoTRIgine 25 MG tablet   Commonly known as:  LaMICtal   Dose:  75 mg   Quantity:  180 tablet        Take 3 tablets (75 mg) by mouth 2 times daily Please call him today   Refills:  2                Prescriptions were sent or printed at these locations (1 Prescription)                   [facility]    [address] [facility] [address]    Telephone:  [phone]   Fax:  [phone]   Hours:                  E-Prescribed (1 of 1)         trimethoprim-polymyxin b (POLYTRIM) ophthalmic solution                Orders Needing Specimen Collection     None      Pending Results     No orders found from 3/18/2018 to 3/21/2018.            Pending Culture Results     No orders found  from 3/18/2018 to 3/21/2018.            Pending Results Instructions     If you had any lab results that were not finalized at the time of your Discharge, you can call the ED Lab Result RN at [phone]. You will be contacted by this team for any positive Lab results or changes in treatment. The nurses are available 7 days a week from 10A to 6:30P.  You can leave a message 24 hours per day and they will return your call.        Thank you for choosing [facility]       Thank you for choosing [facility] for your care. Our goal is always to provide you with excellent care. Hearing back from our patients is one way we can continue to improve our services. Please take a few minutes to complete the written survey that you may receive in the mail after you visit with us. Thank you!        [employer] Information     [employer] gives you secure access to your electronic health record. If you see a primary care provider, you can also send messages to your care team and make appointments. If you have questions, please call your primary care clinic.  If you do not have a primary care provider, please call [phone] and they will assist you.        Care EveryWhere ID     This is your Care EveryWhere ID. This could be used by other organizations to access your [facility] medical records  [id number]        Equal Access to Services     CAROL DURAN : Mehul Sage, reyes king, qasuzette portillo, piero chisholm. So [facility] [phone].    ATENCIÓN: Si habla español, tiene a nicholas disposición servicios gratuitos de asistencia lingüística. Llame al [phone].    We comply with applicable federal civil rights laws and [place] laws. We do not discriminate on the basis of race, color, national origin, age, disability, sex, sexual orientation, or gender identity.            After Visit Summary       This is your record. Keep this with you and show to your community pharmacist(s) and  doctor(s) at your next visit.

## 2018-03-21 NOTE — ED PROVIDER NOTES
"                                                            ED Provider Note       CC:     Chief Complaint   Patient presents with     Foreign Body in Eye     right eye        History is obtained from the patient     HPI: Claude Higgins is a 43 year old male presenting with foreign body sensation in the right eye. Symptoms started 2 days ago when he was using a fine soft to work on a engine part.  Patient was wearing safety goggles at the time.  He noticed a sensation in the right eye after he was done with his work.  Patient tried to flush his eyes and the symptoms seem to come and go.  This afternoon, he started to feel more irritation and the eyes were crusted.  He has no loss of vision, but because of some mattering, his vision is more blurred.  Current pain level is mild to moderate.  His last tetanus shot was in 2015.        Patient Active Problem List   Diagnosis     Hyperlipidemia LDL goal <160     Tobacco use disorder     Unilateral inguinal hernia without obstruction or gangrene, recurrence not specified     Alcohol use     GERD (gastroesophageal reflux disease)     Cannabis abuse     Seizure disorder (H)     Shoulder strain, right, subsequent encounter     Past Surgical History:   Procedure Laterality Date     HERNIA REPAIR, INGUINAL RT/LT Right 1976     HERNIORRHAPHY INGUINAL Left 7/10/2015    Procedure: HERNIORRHAPHY INGUINAL;  Surgeon: Vicente Aleman MD;  Location: [facility] OR     SURGICAL HISTORY OF -  Right 7/28/2003-04    Tib/fib, ORIF, bone and skin grafting and arterial bypass     SURGICAL HISTORY OF -  Right 2008    Tib/fib, hardware removal.      TESTICLE SURGERY Right 1993    Torsion correction, and prev. undescended     Social History:    Social History   Substance Use Topics     Smoking status: Current Every Day Smoker     Packs/day: 1.00     Years: 17.00     Types: Cigarettes     Smokeless tobacco: Current User     Alcohol use No      Comment: occasional.  \"started having alcohol seizures so I " "quit\"     Active Meds:   Current Outpatient Prescriptions   Medication Sig Dispense Refill     trimethoprim-polymyxin b (POLYTRIM) ophthalmic solution Apply 1-2 drops to eye every 4 hours (while awake) for 3 days 2 mL 0     lamoTRIgine (LAMICTAL) 25 MG tablet Take 3 tablets (75 mg) by mouth 2 times daily Please call him today 180 tablet 2     cholecalciferol (VITAMIN D) 1000 UNIT tablet Take 1 tablet (1,000 Units) by mouth daily 90 tablet 3     ibuprofen (ADVIL) 200 MG capsule Take 200 mg by mouth 3 times daily as needed       Allergies: Penicillin g      ROS: 5 point ROS negative, including no recent illness, fever, chills, headaches, chest pains, difficulty breathing, nausea, excessive bleeding.    Physical Exam:  Vitals:    03/20/18 1850   BP: 125/88   Pulse: 76   Resp: 17   Temp: 96.8  F (36  C)   TempSrc: Oral   SpO2: 96%   Weight: 77.1 kg (170 lb)   Height: 1.753 m (5' 9\")     GENERAL APPEARANCE: well nourished, alert and in moderate distress  HEAD: Atraumatic   EYES: PERRL, EOMI, left eye normal lid, conjunctiva, cornea, pupil and fundus.  Right conjunctiva is injected.  Eyelid was everted and no FB seen. Fluorescein exam reveals a defect at 3 o'clock position.  There is a small metallic foreign body..  HENT: oral exam benign; pharynx noninjected  RESP: Normal respiratory effort  EXT: normal ROM  SKIN: no rash  NEURO: mentation and speech normal; no focal deficits    Procedure note: The patient's right eyelid was everted and there is no foreign body.  Forced exam reveals a small metallic foreign body with forcing uptake at 3 o'clock position.  The right eye was locally anesthetized with 4 drops of tetracaine.  A small bur was used to remove the foreign body, and the eye was reexamined.  The eye was flushed with saline, and no foreign bodies or rust ring were noted.       Impression:  Final diagnoses:   Foreign body of right cornea         ED Course & Medical Decision Making (Plan):  Patient was seen for " foreign body sensation in the right eye with findings of a small metallic embedded corneal foreign body at 3 o'clock position in the right eye.  This was eventually removed with a bur, and after instructions were discussed.  Begin Polytrim eyedrops for the next 3 days.  Patient's tetanus status is up to date.  Take ibuprofen 800 mg 3 times a day for pain.  Follow-up in the clinic or return to the ED in 24-48 hours if symptoms persist.       New Prescriptions    TRIMETHOPRIM-POLYMYXIN B (POLYTRIM) OPHTHALMIC SOLUTION    Apply 1-2 drops to eye every 4 hours (while awake) for 3 days            Annabelle Sapp MD  03/20/18 1939

## 2018-03-21 NOTE — DISCHARGE INSTRUCTIONS
"Thank you for giving us the opportunity to see you. The impression is that you have a corneal foreign body that was with a bur.    Use Polytrim eyedrops 1-2 drops in the right eye every 4 hours while awake for the next 3 days.    Follow-up in the next 24-48 hours if you have a persistent foreign body sensation.    Your tetanus is up-to-date given in 2015.    After discharge, please closely monitor for any new or worsening symptoms. Return to the Emergency Department at any time if your symptoms worsen.        Particle Removed from Eye (Corneal Foreign Body)    A particle got into your eye and stuck to the cornea (the clear part in the front of the eye). Your healthcare provider has removed this particle. The cornea is very sensitive and may still hurt for another 1 to 2 days while it heals.  If a metal particle was in your eye, a \"rust ring\" may have formed. This may require a second visit for complete removal.  Your healthcare provider may have put an eye patch on your eye. This may help the healing process. But you also may not receive an eye patch. For several types of injuries to the cornea, they are not recommended.  Home care    You may wear sunglasses to help reduce symptoms while the eye is healing, unless advised otherwise by your healthcare provider.    You may use acetaminophen or ibuprofen to control pain, unless another pain medicine was prescribed. (Note: If you have chronic liver or kidney disease, or if you have ever had a stomach ulcer or gastrointestinal bleeding, talk with your healthcare provider before using these medicines.)    If you received an eye patch:    It should not be left in place for more than 24 hours, unless advised otherwise by your healthcare provider.    Always keep your return appointment for patch removal and re-exam. Your eye could be harmed if the patch remains in place longer than advised.    Do not drive a motor vehicle or operate machinery with the patch in place. You " will have trouble judging distances with only one eye.    If eye drops or ointment were prescribed, use as directed.  Follow-up care    No eye patch: If no patch was used, but the pain continues for more than 48 hours, you should have another eye exam.    Eye patch: If your eye was patched and you were given a return appointment for patch removal and re-exam, do not miss the visit. Again, it could be harmful to your eye if the patch remains in place longer than advised. However, if you were asked to remove the eye patch within 24 hours (or as directed by your healthcare provider), contact your healthcare provider right away if your pain increases or get worse after removal of the eye patch.  When to seek medical advice  Call your healthcare provider right away if any of these occur:    Increasing eye pain or pain that does not improve after 24 hours    Discharge from the eye    Redness of the eye or swelling of the eyelids    Worsening vision  Date Last Reviewed: 2/24/2017 2000-2017 [employer] [address] [place] [address], [place] [address]. All rights reserved. This information is not intended as a substitute for professional medical care. Always follow your healthcare professional's instructions.

## 2018-03-30 ENCOUNTER — OFFICE VISIT (OUTPATIENT)
Dept: NEUROLOGY | Facility: CLINIC | Age: 43
End: 2018-03-30
Payer: COMMERCIAL

## 2018-03-30 VITALS
BODY MASS INDEX: 22.52 KG/M2 | SYSTOLIC BLOOD PRESSURE: 121 MMHG | OXYGEN SATURATION: 99 % | WEIGHT: 157.3 LBS | DIASTOLIC BLOOD PRESSURE: 80 MMHG | HEIGHT: 70 IN | HEART RATE: 71 BPM

## 2018-03-30 DIAGNOSIS — G40.909 SEIZURE DISORDER (H): ICD-10-CM

## 2018-03-30 DIAGNOSIS — F41.1 GAD (GENERALIZED ANXIETY DISORDER): Primary | ICD-10-CM

## 2018-03-30 PROCEDURE — 99214 OFFICE O/P EST MOD 30 MIN: CPT | Performed by: PSYCHIATRY & NEUROLOGY

## 2018-03-30 RX ORDER — BUSPIRONE HYDROCHLORIDE 7.5 MG/1
TABLET ORAL
Qty: 90 TABLET | Refills: 1 | Status: SHIPPED | OUTPATIENT
Start: 2018-03-30 | End: 2018-12-14

## 2018-03-30 RX ORDER — LAMOTRIGINE 25 MG/1
75 TABLET ORAL 2 TIMES DAILY
Qty: 180 TABLET | Refills: 2 | Status: SHIPPED | OUTPATIENT
Start: 2018-03-30 | End: 2018-06-08

## 2018-03-30 ASSESSMENT — PAIN SCALES - GENERAL: PAINLEVEL: NO PAIN (0)

## 2018-03-30 NOTE — LETTER
Date:April 2, 2018      Patient was self referred, no letter generated. Do not send.        [facility] Health Information

## 2018-03-30 NOTE — MR AVS SNAPSHOT
After Visit Summary   3/30/2018    Claude Higgins    MRN: [id number]           Patient Information     Date Of Birth          1975        Visit Information        Provider Department      3/30/2018 8:00 AM Lorenza Hinson MD [facility]        Today's Diagnoses     LUDWIG (generalized anxiety disorder)    -  1    Seizure disorder (H)           Follow-ups after your visit        Follow-up notes from your care team     Return in about 2 months (around 5/30/2018).      Your next 10 appointments already scheduled     Jun 08, 2018  8:00 AM CDT   Return Visit with Lorenza Hinson MD   [facility]    [address] [place] [id number] [phone]              Who to contact     If you have questions or need follow up information about today's clinic visit or your schedule please contact [facility] directly at [phone].  Normal or non-critical lab and imaging results will be communicated to you by [employer], letter or phone within 4 business days after the clinic has received the results. If you do not hear from us within 7 days, please contact the clinic through [employer] or phone. If you have a critical or abnormal lab result, we will notify you by phone as soon as possible.  Submit refill requests through [employer] or call your pharmacy and they will forward the refill request to us. Please allow 3 business days for your refill to be completed.          Additional Information About Your Visit        C[employer]hart Information     [employer] gives you secure access to your electronic health record. If you see a primary care provider, you can also send messages to your care team and make appointments. If you have questions, please call your primary care clinic.  If you do not have a primary care provider, please call [phone] and they will assist you.      [employer] is an electronic gateway that provides  "easy, online access to your medical records. With [employer], you can request a clinic appointment, read your test results, renew a prescription or communicate with your care team.     To access your existing account, please contact your [facility] or call [phone] for assistance.        Care EveryWhere ID     This is your Care EveryWhere ID. This could be used by other organizations to access your [facility] records  [id number]        Your Vitals Were     Pulse Height Pulse Oximetry BMI (Body Mass Index)          71 1.765 m (5' 9.5\") 99% 22.9 kg/m2         Blood Pressure from Last 3 Encounters:   03/30/18 121/80   03/20/18 125/88   01/26/18 122/81    Weight from Last 3 Encounters:   03/30/18 71.4 kg (157 lb 4.8 oz)   03/20/18 77.1 kg (170 lb)   01/26/18 72.6 kg (160 lb)              Today, you had the following     No orders found for display         Today's Medication Changes          These changes are accurate as of 3/30/18  8:45 AM.  If you have any questions, ask your nurse or doctor.               Start taking these medicines.        Dose/Directions    busPIRone 7.5 MG tablet   Commonly known as:  BUSPAR   Used for:  LUDWIG (generalized anxiety disorder)   Started by:  Lorenza Hinson MD        Take 1 tab twice a day.  May increase to 1 tabs am and 2 tabs pm after one week if tolerated.   Quantity:  90 tablet   Refills:  1         These medicines have changed or have updated prescriptions.        Dose/Directions    lamoTRIgine 25 MG tablet   Commonly known as:  LaMICtal   This may have changed:  additional instructions   Used for:  Seizure disorder (H)   Changed by:  Lorenza Hinson MD        Dose:  75 mg   Take 3 tablets (75 mg) by mouth 2 times daily   Quantity:  180 tablet   Refills:  2            Where to get your medicines      These medications were sent to [facility] - [id number] [place]  [address] [place] " [id number]     Phone:  [phone]     busPIRone 7.5 MG tablet    lamoTRIgine 25 MG tablet                Primary Care Provider Fax #    Physician No Ref-Primary [phone]       No address on file        Equal Access to Services     CAROL RENEE : Mehul noemi metcalf lulú Sonaomi, waaxda luqadaha, qaybta kaalmada adedavon, piero wallrachael chisholm. So [facility] [phone].    ATENCIÓN: Si habla español, tiene a nicholas disposición servicios gratuitos de asistencia lingüística. Llame al [phone].    We comply with applicable federal civil rights laws and [place] laws. We do not discriminate on the basis of race, color, national origin, age, disability, sex, sexual orientation, or gender identity.            Thank you!     Thank you for choosing [facility]  for your care. Our goal is always to provide you with excellent care. Hearing back from our patients is one way we can continue to improve our services. Please take a few minutes to complete the written survey that you may receive in the mail after your visit with us. Thank you!             Your Updated Medication List - Protect others around you: Learn how to safely use, store and throw away your medicines at www.disposemymeds.org.          This list is accurate as of 3/30/18  8:45 AM.  Always use your most recent med list.                   Brand Name Dispense Instructions for use Diagnosis    ADVIL 200 MG capsule   Generic drug:  ibuprofen      Take 200 mg by mouth 3 times daily as needed        busPIRone 7.5 MG tablet    BUSPAR    90 tablet    Take 1 tab twice a day.  May increase to 1 tabs am and 2 tabs pm after one week if tolerated.    LUDWIG (generalized anxiety disorder)       cholecalciferol 1000 UNIT tablet    vitamin D3    90 tablet    Take 1 tablet (1,000 Units) by mouth daily    Low vitamin D level       lamoTRIgine 25 MG tablet    LaMICtal    180 tablet    Take 3 tablets (75 mg) by mouth 2 times daily    Seizure  disorder (H)

## 2018-03-30 NOTE — LETTER
"    3/30/2018         RE: Claude Higgins  [address]   [facility] [address]        Dear Colleague,    Thank you for referring your patient, Claude Higgins, to the [facility]. Please see a copy of my visit note below.     NEUROLOGY PROGRESS NOTE   [facility]    Patient:Claude Higgins  : 1975  Age: 43 year old  Today's Office Visit: 2018    Seizure History:     The patient's seizures started 2016.  He had 3 seizures in his life, the last one was 10/2016.  All his seizures were alcohol related.  He states he did binge drink the night before all his seizures.  He describes his seizure as below, he has had no auras, and \"I just lose consciousness.\"  Witnesses have reported shaking all over, making noises, and stiffening up.  He had urinary incontinence with his last seizure, denies tongue biting, major injuries or car accidents.  He tried levetiracetam, but he states \"it turned me into a zombie and slowed down my brain.\"  Then he was started on lamotrigine.  He is currently taking lamotrigine 75 mg twice a day.     History of present illness:     The patient did not have any seizures since last visit.  He thinks his last seizure was end of .  He was riding home in a truck a had a seizure, he was told he convulsed and had urinary incontinence.  His seizures were alcohol-related. He also drank the night before this seizure.  He still drinks a couple beer here and there, and one week he drank the whole week and started getting back pain, which he got before his seizure, so he stopped drinking.  He is taking lamotrigine 75 mg bid.  His EEG and brain MRI were unremarkable. He denies medication side effects.  He wants to quit marijuana and also working on cutting back on his drinking.      Current Outpatient Prescriptions   Medication Sig Dispense Refill     lamoTRIgine (LAMICTAL) 25 MG tablet Take 3 tablets (75 mg) by mouth 2 times daily Please call him today 180 tablet 2     " "cholecalciferol (VITAMIN D) 1000 UNIT tablet Take 1 tablet (1,000 Units) by mouth daily 90 tablet 3     ibuprofen (ADVIL) 200 MG capsule Take 200 mg by mouth 3 times daily as needed     Results for MIRELLA PENALOZA (MRN [id number]) as of 3/30/2018 09:29   Ref. Range 1/24/2018 09:26   Lamotrigine Level Latest Ref Range: 2.5 - 15.0 ug/mL 3.1     Review of Systems:    Lethargy / Tiredness:  No  Sleepiness:  No  Nausea / Vomiting:  No  Blurred Vision:  No  Double Vision:  No  Anxiety: Yes  Memory Problems:  Yes  Poor Balance:  No  Dizziness:  No  Sleep Changes:  Can't sleep well  Headache: No  Respiratory: Shortness of breath- on exercise, No shortness of breath at rest and No cough  Cardiovascular: negative  Have you experienced a traumatic fall related to your events: No    Exam:    /80  Pulse 71  Ht 1.765 m (5' 9.5\")  Wt 71.4 kg (157 lb 4.8 oz)  SpO2 99%  BMI 22.9 kg/m2     Wt Readings from Last 5 Encounters:   03/30/18 71.4 kg (157 lb 4.8 oz)   03/20/18 77.1 kg (170 lb)   01/26/18 72.6 kg (160 lb)   10/18/17 71.6 kg (157 lb 12.8 oz)   08/09/17 70.3 kg (155 lb)     General Appearance: Alert, awake, cooperative, NAD  NEUROLOGICAL EXAM:  Gait: steady, tandem gait: slight trouble with tandem gait  Language/speech:  No aphasia or dysarthria  Extraocular Movements:  Normal  Coordination: slight intentional tremors in FNF  Facial Strength:  Normal  Tongue Strength:  Normal  Motor Exam:  5/5 bilaterally, normal bulk, normal tone    Assessment/Plan:    1.  Seizures, probably alcohol-related:  EEG and brain MRI were unremarkable.  Abnormalities in parotid glands seen on MRI were discussed.  These findings show a sequela of parotid gland inflammation.  The patient states when he was younger, he used to get swelling around his jaws, but currently he is fine.   The patient cut back on his drinking, but still drinks a couple beers here and there.  He says he doesn't need to get help from alcohol rehab program.  He says " he will try more.  His last seizure was end of 2016, and he hasn't yet quit drinking yet, therefore I advised him to continue taking his lamotrigine.  He agrees since he doesn't want to take a risk of having another seizure.      2.  Anxiety:  I discussed possibility of anxiety last time and he wasn't sure about it.  He says his girlfriend and his neighbor believe he has anxiety.  He was using marijuana to calm himself down, he is trying to quit that too.  I discussed psychotherapy.  I also suggested to try buspirone.  Side effects were discussed.    - Continue lamotrigine 75 mg bid.  - Take buspirone 7.5 mg bid X 1 week, then may increase to 7.5 mg am and 15 mg pm if tolerated.   - RTC in 2 months    As described above, I met with the patient for 25 minutes and during this time counseling was greater than 50% of the visit time.    Lorenza Hinson MD      Again, thank you for allowing me to participate in the care of your patient.        Sincerely,        Lorenza Hinson MD

## 2018-03-30 NOTE — PROGRESS NOTES
" NEUROLOGY PROGRESS NOTE   [facility]    Patient:Claude Penaloza  : 1975  Age: 43 year old  Today's Office Visit: 2018    Seizure History:     The patient's seizures started 2016.  He had 3 seizures in his life, the last one was 10/2016.  All his seizures were alcohol related.  He states he did binge drink the night before all his seizures.  He describes his seizure as below, he has had no auras, and \"I just lose consciousness.\"  Witnesses have reported shaking all over, making noises, and stiffening up.  He had urinary incontinence with his last seizure, denies tongue biting, major injuries or car accidents.  He tried levetiracetam, but he states \"it turned me into a zombie and slowed down my brain.\"  Then he was started on lamotrigine.  He is currently taking lamotrigine 75 mg twice a day.     History of present illness:     The patient did not have any seizures since last visit.  He thinks his last seizure was end of .  He was riding home in a truck a had a seizure, he was told he convulsed and had urinary incontinence.  His seizures were alcohol-related. He also drank the night before this seizure.  He still drinks a couple beer here and there, and one week he drank the whole week and started getting back pain, which he got before his seizure, so he stopped drinking.  He is taking lamotrigine 75 mg bid.  His EEG and brain MRI were unremarkable. He denies medication side effects.  He wants to quit marijuana and also working on cutting back on his drinking.      Current Outpatient Prescriptions   Medication Sig Dispense Refill     lamoTRIgine (LAMICTAL) 25 MG tablet Take 3 tablets (75 mg) by mouth 2 times daily Please call him today 180 tablet 2     cholecalciferol (VITAMIN D) 1000 UNIT tablet Take 1 tablet (1,000 Units) by mouth daily 90 tablet 3     ibuprofen (ADVIL) 200 MG capsule Take 200 mg by mouth 3 times daily as needed     Results for CLAUDE PENALOZA (MRN [id number]) as of " "3/30/2018 09:29   Ref. Range 1/24/2018 09:26   Lamotrigine Level Latest Ref Range: 2.5 - 15.0 ug/mL 3.1     Review of Systems:    Lethargy / Tiredness:  No  Sleepiness:  No  Nausea / Vomiting:  No  Blurred Vision:  No  Double Vision:  No  Anxiety: Yes  Memory Problems:  Yes  Poor Balance:  No  Dizziness:  No  Sleep Changes:  Can't sleep well  Headache: No  Respiratory: Shortness of breath- on exercise, No shortness of breath at rest and No cough  Cardiovascular: negative  Have you experienced a traumatic fall related to your events: No    Exam:    /80  Pulse 71  Ht 1.765 m (5' 9.5\")  Wt 71.4 kg (157 lb 4.8 oz)  SpO2 99%  BMI 22.9 kg/m2     Wt Readings from Last 5 Encounters:   03/30/18 71.4 kg (157 lb 4.8 oz)   03/20/18 77.1 kg (170 lb)   01/26/18 72.6 kg (160 lb)   10/18/17 71.6 kg (157 lb 12.8 oz)   08/09/17 70.3 kg (155 lb)     General Appearance: Alert, awake, cooperative, NAD  NEUROLOGICAL EXAM:  Gait: steady, tandem gait: slight trouble with tandem gait  Language/speech:  No aphasia or dysarthria  Extraocular Movements:  Normal  Coordination: slight intentional tremors in FNF  Facial Strength:  Normal  Tongue Strength:  Normal  Motor Exam:  5/5 bilaterally, normal bulk, normal tone    Assessment/Plan:    1.  Seizures, probably alcohol-related:  EEG and brain MRI were unremarkable.  Abnormalities in parotid glands seen on MRI were discussed.  These findings show a sequela of parotid gland inflammation.  The patient states when he was younger, he used to get swelling around his jaws, but currently he is fine.   The patient cut back on his drinking, but still drinks a couple beers here and there.  He says he doesn't need to get help from alcohol rehab program.  He says he will try more.  His last seizure was end of 2016, and he hasn't yet quit drinking yet, therefore I advised him to continue taking his lamotrigine.  He agrees since he doesn't want to take a risk of having another seizure.      2.  " Anxiety:  I discussed possibility of anxiety last time and he wasn't sure about it.  He says his girlfriend and his neighbor believe he has anxiety.  He was using marijuana to calm himself down, he is trying to quit that too.  I discussed psychotherapy.  I also suggested to try buspirone.  Side effects were discussed.    - Continue lamotrigine 75 mg bid.  - Take buspirone 7.5 mg bid X 1 week, then may increase to 7.5 mg am and 15 mg pm if tolerated.   - RTC in 2 months    As described above, I met with the patient for 25 minutes and during this time counseling was greater than 50% of the visit time.    Lorenza Hinson MD

## 2018-03-30 NOTE — NURSING NOTE
"Claude Higgins's goals for this visit include: recheck  He requests these members of his care team be copied on today's visit information:     PCP: No Ref-Primary, Physician    Referring Provider:  No referring provider defined for this encounter.    Chief Complaint   Patient presents with     RECHECK       Initial /80  Pulse 71  Ht 1.765 m (5' 9.5\")  Wt 71.4 kg (157 lb 4.8 oz)  SpO2 99%  BMI 22.9 kg/m2 Estimated body mass index is 22.9 kg/(m^2) as calculated from the following:    Height as of this encounter: 1.765 m (5' 9.5\").    Weight as of this encounter: 71.4 kg (157 lb 4.8 oz).  Medication Reconciliation: complete    "

## 2018-06-08 ENCOUNTER — OFFICE VISIT (OUTPATIENT)
Dept: NEUROLOGY | Facility: CLINIC | Age: 43
End: 2018-06-08
Payer: COMMERCIAL

## 2018-06-08 VITALS
SYSTOLIC BLOOD PRESSURE: 124 MMHG | BODY MASS INDEX: 22.42 KG/M2 | OXYGEN SATURATION: 98 % | WEIGHT: 154 LBS | DIASTOLIC BLOOD PRESSURE: 85 MMHG | HEART RATE: 73 BPM

## 2018-06-08 DIAGNOSIS — G40.909 SEIZURE DISORDER (H): ICD-10-CM

## 2018-06-08 PROCEDURE — 80175 DRUG SCREEN QUAN LAMOTRIGINE: CPT | Mod: 90 | Performed by: PSYCHIATRY & NEUROLOGY

## 2018-06-08 PROCEDURE — 99000 SPECIMEN HANDLING OFFICE-LAB: CPT | Performed by: PSYCHIATRY & NEUROLOGY

## 2018-06-08 PROCEDURE — 99214 OFFICE O/P EST MOD 30 MIN: CPT | Performed by: PSYCHIATRY & NEUROLOGY

## 2018-06-08 RX ORDER — LAMOTRIGINE 25 MG/1
75 TABLET ORAL 2 TIMES DAILY
Qty: 540 TABLET | Refills: 3 | Status: SHIPPED | OUTPATIENT
Start: 2018-06-08 | End: 2019-06-14

## 2018-06-08 ASSESSMENT — PAIN SCALES - GENERAL: PAINLEVEL: NO PAIN (0)

## 2018-06-08 NOTE — LETTER
"    2018         RE: Claude Higgins  [address]  [facility] [address]        Dear Colleague,    Thank you for referring your patient, Claude Higgins, to the [facility]. Please see a copy of my visit note below.     NEUROLOGY PROGRESS NOTE   [facility]    Patient:Claude Higgins  : 1975  Age: 43 year old  Today's Office Visit: 2018    The patient's seizures started 2016.  He had 3 seizures in his life, the last one was 10/2016.  All his seizures were alcohol related.  He states he did binge drink the night before all his seizures.  He describes his seizure as below, he has had no auras, and \"I just lose consciousness.\"  Witnesses have reported shaking all over, making noises, and stiffening up.  He had urinary incontinence with his last seizure, denies tongue biting, major injuries or car accidents.  He tried levetiracetam, but he states \"it turned me into a zombie and slowed down my brain.\"  Then he was started on lamotrigine.  He is currently taking lamotrigine 75 mg twice a day.     History of present illness:     The patient is here for a follow up on his seizures.  He did not have any seizures since last visit.  He is taking lamotrigine 75 mg.  He denies dizziness, imbalance, double/blurred vision, tiredness.      He did quit marijuana.  He stopped taking Buspirone because he did not feel any difference.  His neighbor and GF who felt he had anxiety, did not notice any changes.  Also pills seemed to stuck in his throat.     Current Outpatient Prescriptions   Medication Sig Dispense Refill     busPIRone (BUSPAR) 7.5 MG tablet Take 1 tab twice a day.  May increase to 1 tabs am and 2 tabs pm after one week if tolerated. (Patient not taking: Reported on 2018) 90 tablet 1     cholecalciferol (VITAMIN D) 1000 UNIT tablet Take 1 tablet (1,000 Units) by mouth daily 90 tablet 3     ibuprofen (ADVIL) 200 MG capsule Take 200 mg by mouth 3 times daily as needed       " lamoTRIgine (LAMICTAL) 25 MG tablet Take 3 tablets (75 mg) by mouth 2 times daily 180 tablet 2     Results for MIRELLA PENALOZA (MRN [id number]) as of 6/8/2018 08:23   Ref. Range 1/24/2018 09:26   Lamotrigine Level Latest Ref Range: 2.5 - 15.0 ug/mL 3.1     Review of Systems:    Lethargy / Tiredness:  No  Sleepiness:  No  Nausea / Vomiting:  No  Blurred Vision:  No  Double Vision:  No  Depression:  No  Anxiety: Yes  Slowed Cognitive Function:  No  Memory Problems:  No  Poor Balance:  No  Dizziness:  No  Appetite Changes:  Decreased appetite since quitted marijuana  Sleep Problems: has trouble falling asleep, no change  Behavioral Changes:  No  Rash: negative  Respiratory: No shortness of breath and No cough  Cardiovascular: negative  Have you experienced a traumatic fall related to your events: No    Exam:    /85 (BP Location: Left arm, Patient Position: Sitting, Cuff Size: Adult Regular)  Pulse 73  Wt 154 lb (69.9 kg)  SpO2 98%  BMI 22.42 kg/m2     Wt Readings from Last 5 Encounters:   06/08/18 154 lb (69.9 kg)   03/30/18 157 lb 4.8 oz (71.4 kg)   03/20/18 170 lb (77.1 kg)   01/26/18 160 lb (72.6 kg)   10/18/17 157 lb 12.8 oz (71.6 kg)     General Appearance: Alert, awake, cooperative and pleasant, NAD    NEUROLOGICAL EXAM:  Gait:  Steady, tandem gait: slight trouble with tandem gait (right leg old scar)  Attention Span:  Normal  Language/speech: Normal FNF  Extraocular Movements:  intact  Coordination:  Normal FNF, no tremors or dysmetria  Facial Strength:  Normal  Tongue Strength:  Normal  Motor exam: Normal tone, bulk and strength 5/5    Assessment/Plan:    1. Convulsions, probably alcohol related. Normal EEG and MRI.  The patient is not sure when his last seizure was, may be end of December.  He hasn't quit drinking.  He denied needing help for stopping alcohol.  He says he is working on it himself.  If he quit drinking and he stays seizure-free will consider taking him off lamotrigine.     - Continue  LMT 75 mg bid  - Keep working on quitting alcohol  - Obtain LMT level for efficacy and compliance    2. Anxiety: he doesn't think he has anxiety, but his GF and neighbor told him he has it.  He feels fine.  He stopped taking Buspirone, because he and his GF did not notice any change.  I suggested if he needed to see a psychologist for therapy or if he felt he wanted to start a medication to let me now.  We can start an SSRI or SNRI.    - RTC in 6 months      As described above, I met with the patient for 25 minutes and during this time counseling was greater than 50% of the visit time.    Lorenza Hinson MD      Again, thank you for allowing me to participate in the care of your patient.        Sincerely,        Lorenza Hinson MD

## 2018-06-08 NOTE — PATIENT INSTRUCTIONS
Thank you for choosing [facility]. It was a pleasure to see you for your office visit today.     The following is a summary of your office visit:      Appointments Made today:6mo tonny    Nurse/clinic contact information:[facility] [phone]    Further instructions for your care:Stop at [facility] for test ordered by Dr. Fischer.        If you had any blood work, imaging or other test we will be in touch regarding the results. If there is anything abnormal you will be contacted by phone and if the results are normal you will receive a letter in the mail. If you have any questions or concerns please contact us at [phone].

## 2018-06-08 NOTE — PROGRESS NOTES
" NEUROLOGY PROGRESS NOTE   [facility]    Patient:Claude Penaloza  : 1975  Age: 43 year old  Today's Office Visit: 2018    The patient's seizures started 2016.  He had 3 seizures in his life, the last one was 10/2016.  All his seizures were alcohol related.  He states he did binge drink the night before all his seizures.  He describes his seizure as below, he has had no auras, and \"I just lose consciousness.\"  Witnesses have reported shaking all over, making noises, and stiffening up.  He had urinary incontinence with his last seizure, denies tongue biting, major injuries or car accidents.  He tried levetiracetam, but he states \"it turned me into a zombie and slowed down my brain.\"  Then he was started on lamotrigine.  He is currently taking lamotrigine 75 mg twice a day.     History of present illness:     The patient is here for a follow up on his seizures.  He did not have any seizures since last visit.  He is taking lamotrigine 75 mg.  He denies dizziness, imbalance, double/blurred vision, tiredness.      He did quit marijuana.  He stopped taking Buspirone because he did not feel any difference.  His neighbor and GF who felt he had anxiety, did not notice any changes.  Also pills seemed to stuck in his throat.     Current Outpatient Prescriptions   Medication Sig Dispense Refill     busPIRone (BUSPAR) 7.5 MG tablet Take 1 tab twice a day.  May increase to 1 tabs am and 2 tabs pm after one week if tolerated. (Patient not taking: Reported on 2018) 90 tablet 1     cholecalciferol (VITAMIN D) 1000 UNIT tablet Take 1 tablet (1,000 Units) by mouth daily 90 tablet 3     ibuprofen (ADVIL) 200 MG capsule Take 200 mg by mouth 3 times daily as needed       lamoTRIgine (LAMICTAL) 25 MG tablet Take 3 tablets (75 mg) by mouth 2 times daily 180 tablet 2     Results for CLAUDE PENALOZA (MRN [id number]) as of 2018 08:23   Ref. Range 2018 09:26   Lamotrigine Level Latest Ref Range: 2.5 - 15.0 " ug/mL 3.1     Review of Systems:    Lethargy / Tiredness:  No  Sleepiness:  No  Nausea / Vomiting:  No  Blurred Vision:  No  Double Vision:  No  Depression:  No  Anxiety: Yes  Slowed Cognitive Function:  No  Memory Problems:  No  Poor Balance:  No  Dizziness:  No  Appetite Changes:  Decreased appetite since quitted marijuana  Sleep Problems: has trouble falling asleep, no change  Behavioral Changes:  No  Rash: negative  Respiratory: No shortness of breath and No cough  Cardiovascular: negative  Have you experienced a traumatic fall related to your events: No    Exam:    /85 (BP Location: Left arm, Patient Position: Sitting, Cuff Size: Adult Regular)  Pulse 73  Wt 154 lb (69.9 kg)  SpO2 98%  BMI 22.42 kg/m2     Wt Readings from Last 5 Encounters:   06/08/18 154 lb (69.9 kg)   03/30/18 157 lb 4.8 oz (71.4 kg)   03/20/18 170 lb (77.1 kg)   01/26/18 160 lb (72.6 kg)   10/18/17 157 lb 12.8 oz (71.6 kg)     General Appearance: Alert, awake, cooperative and pleasant, NAD    NEUROLOGICAL EXAM:  Gait:  Steady, tandem gait: slight trouble with tandem gait (right leg old scar)  Attention Span:  Normal  Language/speech: Normal FNF  Extraocular Movements:  intact  Coordination:  Normal FNF, no tremors or dysmetria  Facial Strength:  Normal  Tongue Strength:  Normal  Motor exam: Normal tone, bulk and strength 5/5    Assessment/Plan:    1. Convulsions, probably alcohol related. Normal EEG and MRI.  The patient is not sure when his last seizure was, may be end of December.  He hasn't quit drinking.  He denied needing help for stopping alcohol.  He says he is working on it himself.  If he quit drinking and he stays seizure-free will consider taking him off lamotrigine.     - Continue LMT 75 mg bid  - Keep working on quitting alcohol  - Obtain LMT level for efficacy and compliance    2. Anxiety: he doesn't think he has anxiety, but his GF and neighbor told him he has it.  He feels fine.  He stopped taking Buspirone, because  he and his GF did not notice any change.  I suggested if he needed to see a psychologist for therapy or if he felt he wanted to start a medication to let me now.  We can start an SSRI or SNRI.    - RTC in 6 months      As described above, I met with the patient for 25 minutes and during this time counseling was greater than 50% of the visit time.    Lorenza Hinson MD

## 2018-06-08 NOTE — LETTER
Date:June 11, 2018      Patient was self referred, no letter generated. Do not send.        [facility] Health Information

## 2018-06-08 NOTE — MR AVS SNAPSHOT
After Visit Summary   6/8/2018    Claude Higgins    MRN: [id number]           Patient Information     Date Of Birth          1975        Visit Information        Provider Department      6/8/2018 8:00 AM Lorenza Hinson MD [facility]        Today's Diagnoses     Seizure disorder (H)          Care Instructions    Thank you for choosing [facility]. It was a pleasure to see you for your office visit today.     The following is a summary of your office visit:      Appointments Made today:6mo recheck    Nurse/clinic contact information:[facility] [phone]    Further instructions for your care:Stop at [facility] for test ordered by Dr. Fischer.        If you had any blood work, imaging or other test we will be in touch regarding the results. If there is anything abnormal you will be contacted by phone and if the results are normal you will receive a letter in the mail. If you have any questions or concerns please contact us at [phone].                   Follow-ups after your visit        Follow-up notes from your care team     Return in about 6 months (around 12/8/2018).      Your next 10 appointments already scheduled     Dec 14, 2018  8:00 AM CST   Return Visit with Lorenza Hinson MD   [facility] ([facility])    [address] [place] [id number] [phone]              Who to contact     If you have questions or need follow up information about today's clinic visit or your schedule please contact [facility] directly at [phone].  Normal or non-critical lab and imaging results will be communicated to you by MyChart, letter or phone within 4 business days after the clinic has received the results. If you do not hear from us within 7 days, please contact the clinic through MyChart or phone. If you have a critical or abnormal lab result, we will  notify you by phone as soon as possible.  Submit refill requests through [facility] or call your pharmacy and they will forward the refill request to us. Please allow 3 business days for your refill to be completed.          Additional Information About Your Visit        [facility] Information     [facility] gives you secure access to your electronic health record. If you see a primary care provider, you can also send messages to your care team and make appointments. If you have questions, please call your primary care clinic.  If you do not have a primary care provider, please call [phone] and they will assist you.      [facility] is an electronic gateway that provides easy, online access to your medical records. With [facility], you can request a clinic appointment, read your test results, renew a prescription or communicate with your care team.     To access your existing account, please contact your [facility] or call [phone] for assistance.        Care EveryWhere ID     This is your Care EveryWhere ID. This could be used by other organizations to access your [place] medical records  [id number]        Your Vitals Were     Pulse Pulse Oximetry BMI (Body Mass Index)             73 98% 22.42 kg/m2          Blood Pressure from Last 3 Encounters:   06/08/18 124/85   03/30/18 121/80   03/20/18 125/88    Weight from Last 3 Encounters:   06/08/18 69.9 kg (154 lb)   03/30/18 71.4 kg (157 lb 4.8 oz)   03/20/18 77.1 kg (170 lb)              We Performed the Following     Lamotrigine Level          Where to get your medicines      These medications were sent to [facility] - [id number]  [address] [address]     Phone:  [phone]     lamoTRIgine 25 MG tablet          Primary Care Provider Fax #    Physician No Ref-Primary [phone]       No address on file        Equal Access to Services     CAROL CONTRERAS: Mehul chino  Chu, idaniada luhaiadaha, qajessyta kaigor portillo, piero zackeryshen vernon destinyshelton wallrachael phan. So [facility] [phone].    ATENCIÓN: Si ronny rutledge, tiene a nicholas disposición servicios gratuitos de asistencia lingüística. Ranjit al [phone].    We comply with applicable federal civil rights laws and [place] laws. We do not discriminate on the basis of race, color, national origin, age, disability, sex, sexual orientation, or gender identity.            Thank you!     Thank you for choosing [facility]  for your care. Our goal is always to provide you with excellent care. Hearing back from our patients is one way we can continue to improve our services. Please take a few minutes to complete the written survey that you may receive in the mail after your visit with us. Thank you!             Your Updated Medication List - Protect others around you: Learn how to safely use, store and throw away your medicines at www.disposemymeds.org.          This list is accurate as of 6/8/18  8:35 AM.  Always use your most recent med list.                   Brand Name Dispense Instructions for use Diagnosis    ADVIL 200 MG capsule   Generic drug:  ibuprofen      Take 200 mg by mouth 3 times daily as needed        busPIRone 7.5 MG tablet    BUSPAR    90 tablet    Take 1 tab twice a day.  May increase to 1 tabs am and 2 tabs pm after one week if tolerated.    LUDWIG (generalized anxiety disorder)       cholecalciferol 1000 UNIT tablet    vitamin D3    90 tablet    Take 1 tablet (1,000 Units) by mouth daily    Low vitamin D level       lamoTRIgine 25 MG tablet    LaMICtal    540 tablet    Take 3 tablets (75 mg) by mouth 2 times daily    Seizure disorder (H)

## 2018-06-08 NOTE — NURSING NOTE
Claude Higgins's goals for this visit include:   Chief Complaint   Patient presents with     RECHECK     history of seizures        He requests these members of his care team be copied on today's visit information: no    PCP: No Ref-Primary, Physician    Referring Provider:  No referring provider defined for this encounter.    There were no vitals taken for this visit.    Do you need any medication refills at today's visit? Laxmi Valentine LPN

## 2018-06-09 LAB — LAMOTRIGINE SERPL-MCNC: 4.3 UG/ML (ref 2.5–15)

## 2018-12-14 ENCOUNTER — OFFICE VISIT (OUTPATIENT)
Dept: NEUROLOGY | Facility: CLINIC | Age: 43
End: 2018-12-14

## 2018-12-14 VITALS
WEIGHT: 157.8 LBS | HEIGHT: 70 IN | OXYGEN SATURATION: 99 % | HEART RATE: 78 BPM | DIASTOLIC BLOOD PRESSURE: 77 MMHG | BODY MASS INDEX: 22.59 KG/M2 | SYSTOLIC BLOOD PRESSURE: 129 MMHG

## 2018-12-14 DIAGNOSIS — R56.9 CONVULSIONS, UNSPECIFIED CONVULSION TYPE (H): Primary | ICD-10-CM

## 2018-12-14 PROCEDURE — 99214 OFFICE O/P EST MOD 30 MIN: CPT | Performed by: PSYCHIATRY & NEUROLOGY

## 2018-12-14 ASSESSMENT — MIFFLIN-ST. JEOR: SCORE: 1617.03

## 2018-12-14 ASSESSMENT — PAIN SCALES - GENERAL: PAINLEVEL: NO PAIN (0)

## 2018-12-14 NOTE — PROGRESS NOTES
" NEUROLOGY PROGRESS NOTE   [facility]    Patient:Claude Higgins  : 1975  Age: 43 year old  Today's Office Visit: 2018    Epilepsy History:     The patient's seizures started 2016.  He had 3 seizures in his life, the last one was 10/2016.  All his seizures were alcohol related.  He states he did binge drink the night before all his seizures.  He describes his seizure as below, he has had no auras, and \"I just lose consciousness.\"  Witnesses have reported shaking all over, making noises, and stiffening up.  He had urinary incontinence with his last seizure, denies tongue biting, major injuries or car accidents.  He tried levetiracetam, but he states \"it turned me into a zombie and slowed down my brain.\"  Then he was started on lamotrigine.  He is currently taking lamotrigine 75 mg twice a day.     History of present illness:    Claude is here for a follow up on his seizures. He did not have any seizures since last visit. He is taking lamotrigine 75 mg bid. He denies side effects.     He drinks between 1-3 beers per day. Hasn't tried to quit. 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 Feels relax when he has a couple of beers at the end of the day. His anxiety is better. He says he works a lot and doesn't have time to be worried.     He has a lump over his jaw on the right, is not painful. He has noted it in the past few months. His brain MRI showed microcystic changes in both parotid glands, consistent with multifocal ductal dilatation or sialoceles, suggesting chronic sequela of an inflammatory process. He is worried about CA. He smokes a pack a day. His father had CA of mouth and throat.     Current Outpatient Medications   Medication Sig Dispense Refill     cholecalciferol (VITAMIN D) 1000 UNIT tablet Take 1 tablet (1,000 Units) by mouth daily 90 tablet 3     ibuprofen (ADVIL) 200 MG capsule Take 200 mg by mouth 3 times daily as needed       lamoTRIgine (LAMICTAL) 25 MG tablet Take 3 " "tablets (75 mg) by mouth 2 times daily 540 tablet 3     busPIRone (BUSPAR) 7.5 MG tablet Take 1 tab twice a day.  May increase to 1 tabs am and 2 tabs pm after one week if tolerated. (Patient not taking: Reported on 6/8/2018) 90 tablet 1     Results for MIRELLA PENALOZA (MRN [id number]) as of 12/14/2018 08:11   Ref. Range 6/8/2018 08:43   Lamotrigine Level Latest Ref Range: 2.5 - 15.0 ug/mL 4.3     Review of Systems:    Lethargy / Tiredness:  No  Sleepiness:  No  Nausea / Vomiting:  No  Blurred Vision:  No  Double Vision:  No  Depression:  No  Anxiety: Yes, better   Memory Problems:  Yes  Poor Balance:  No  Dizziness:  No  Appetite Changes:  No  Sleep Changes:  No  Headache: No  Behavioral Changes:  No  Rash: negative  Respiratory: got over a cold, sxs are better  Cardiovascular: negative  Have you experienced a traumatic fall related to your events: No    Exam:    /77   Pulse 78   Ht 5' 10\" (177.8 cm)   Wt 157 lb 12.8 oz (71.6 kg)   SpO2 99%   BMI 22.64 kg/m       Wt Readings from Last 5 Encounters:   12/14/18 157 lb 12.8 oz (71.6 kg)   06/08/18 154 lb (69.9 kg)   03/30/18 157 lb 4.8 oz (71.4 kg)   03/20/18 170 lb (77.1 kg)   01/26/18 160 lb (72.6 kg)     General Appearance: Alert, awake, cooperative, NAD  HEENT: Atraumatic, normocephalic. There is a palpable 1 cm lump over the right upper jaw, no other palpable mass, pink palpebrae, anicteric sclera.  Gait and tandem gait: steady  Attention Span:  Normal  Language/speech: no aphasia or dysarthria  Extraocular Movements:  Normal  Coordination:  Normal FNF  Facial Sensation:  Normal  Facial Strength:  Normal  Tongue Strength:  Normal  Motor Exam: normal tone, bulk and strength 5/5 bilaterally  Limb Sensation:  Normal    Assessment/Plan:    1. Convulsions: Normal EEG and brain MRI. All his seizures were related to binge drinking. He doesn't binge drink, but still drinks 1-3 beers a night. He does construction. I counseled him about cutting back on his " drinking. He is taking lamotrigine 75 mg bid. His last level was 4.3.     2. Anxiety: he feels better, no longer taking Buspar.     3. Mass over the right jaw: probable parotid duct enlargement (seen on MRI), other DDx: pseudolymphoma, CA. He is a smoker, his behzad had CA of mouth and throat. I suggested doing a CT soft tissue head and neck and chest. He says he doesn't have insurance until next 6 months and cannot do it now. He also doesn't have a PCP. I advised him to establish care with a primary care physician.    - Continue lamotrigine 75 mg bid.  - RTC in 6 months.        As described above, I met with the patient for 25 minutes and during this time counseling was greater than 50% of the visit time.  Lorenza Hinson MD

## 2018-12-14 NOTE — LETTER
Date:December 17, 2018      Patient was self referred, no letter generated. Do not send.        [facility] Health Information

## 2018-12-14 NOTE — NURSING NOTE
"Claude Higgins's goals for this visit include: return  He requests these members of his care team be copied on today's visit information:     PCP: No Ref-Primary, Physician    Referring Provider:  No referring provider defined for this encounter.    /77   Pulse 78   Ht 1.778 m (5' 10\")   Wt 71.6 kg (157 lb 12.8 oz)   SpO2 99%   BMI 22.64 kg/m      Do you need any medication refills at today's visit? y  "

## 2018-12-14 NOTE — LETTER
"    2018         RE: Claude Higgins  [address]  [facility] [address]        Dear Colleague,    Thank you for referring your patient, Claude Higgins, to the [facility]. Please see a copy of my visit note below.     NEUROLOGY PROGRESS NOTE   [facility]    Patient:Claude Higgins  : 1975  Age: 43 year old  Today's Office Visit: 2018    Epilepsy History:     The patient's seizures started 2016.  He had 3 seizures in his life, the last one was 10/2016.  All his seizures were alcohol related.  He states he did binge drink the night before all his seizures.  He describes his seizure as below, he has had no auras, and \"I just lose consciousness.\"  Witnesses have reported shaking all over, making noises, and stiffening up.  He had urinary incontinence with his last seizure, denies tongue biting, major injuries or car accidents.  He tried levetiracetam, but he states \"it turned me into a zombie and slowed down my brain.\"  Then he was started on lamotrigine.  He is currently taking lamotrigine 75 mg twice a day.     History of present illness:    Claude is here for a follow up on his seizures. He did not have any seizures since last visit. He is taking lamotrigine 75 mg bid. He denies side effects.     He drinks between 1-3 beers per day. Hasn't tried to quit. 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 Feels relax when he has a couple of beers at the end of the day. His anxiety is better. He says he works a lot and doesn't have time to be worried.     He has a lump over his jaw on the right, is not painful. He has noted it in the past few months. His brain MRI showed microcystic changes in both parotid glands, consistent with multifocal ductal dilatation or sialoceles, suggesting chronic sequela of an inflammatory process. He is worried about CA. He smokes a pack a day. His father had CA of mouth and throat.     Current Outpatient Medications   Medication Sig Dispense Refill " "    cholecalciferol (VITAMIN D) 1000 UNIT tablet Take 1 tablet (1,000 Units) by mouth daily 90 tablet 3     ibuprofen (ADVIL) 200 MG capsule Take 200 mg by mouth 3 times daily as needed       lamoTRIgine (LAMICTAL) 25 MG tablet Take 3 tablets (75 mg) by mouth 2 times daily 540 tablet 3     busPIRone (BUSPAR) 7.5 MG tablet Take 1 tab twice a day.  May increase to 1 tabs am and 2 tabs pm after one week if tolerated. (Patient not taking: Reported on 6/8/2018) 90 tablet 1     Results for MIRELLA EPNALOZA (MRN [id number]) as of 12/14/2018 08:11   Ref. Range 6/8/2018 08:43   Lamotrigine Level Latest Ref Range: 2.5 - 15.0 ug/mL 4.3     Review of Systems:    Lethargy / Tiredness:  No  Sleepiness:  No  Nausea / Vomiting:  No  Blurred Vision:  No  Double Vision:  No  Depression:  No  Anxiety: Yes, better   Memory Problems:  Yes  Poor Balance:  No  Dizziness:  No  Appetite Changes:  No  Sleep Changes:  No  Headache: No  Behavioral Changes:  No  Rash: negative  Respiratory: got over a cold, sxs are better  Cardiovascular: negative  Have you experienced a traumatic fall related to your events: No    Exam:    /77   Pulse 78   Ht 5' 10\" (177.8 cm)   Wt 157 lb 12.8 oz (71.6 kg)   SpO2 99%   BMI 22.64 kg/m        Wt Readings from Last 5 Encounters:   12/14/18 157 lb 12.8 oz (71.6 kg)   06/08/18 154 lb (69.9 kg)   03/30/18 157 lb 4.8 oz (71.4 kg)   03/20/18 170 lb (77.1 kg)   01/26/18 160 lb (72.6 kg)     General Appearance: Alert, awake, cooperative, NAD  HEENT: Atraumatic, normocephalic. There is a palpable 1 cm lump over the right upper jaw, no other palpable mass, pink palpebrae, anicteric sclera.  Gait and tandem gait: steady  Attention Span:  Normal  Language/speech: no aphasia or dysarthria  Extraocular Movements:  Normal  Coordination:  Normal FNF  Facial Sensation:  Normal  Facial Strength:  Normal  Tongue Strength:  Normal  Motor Exam: normal tone, bulk and strength 5/5 bilaterally  Limb Sensation:  " Normal    Assessment/Plan:    1. Convulsions: Normal EEG and brain MRI. All his seizures were related to binge drinking. He doesn't binge drink, but still drinks 1-3 beers a night. He does construction. I counseled him about cutting back on his drinking. He is taking lamotrigine 75 mg bid. His last level was 4.3.     2. Anxiety: he feels better, no longer taking Buspar.     3. Mass over the right jaw: probable parotid duct enlargement (seen on MRI), other DDx: pseudolymphoma, CA. He is a smoker, his behzad had CA of mouth and throat. I suggested doing a CT soft tissue head and neck and chest. He says he doesn't have insurance until next 6 months and cannot do it now. He also doesn't have a PCP. I advised him to establish care with a primary care physician.    - Continue lamotrigine 75 mg bid.  - RTC in 6 months.        As described above, I met with the patient for 25 minutes and during this time counseling was greater than 50% of the visit time.  Lorenza Hinson MD      Again, thank you for allowing me to participate in the care of your patient.        Sincerely,        Lorenza Hinson MD

## 2019-06-14 ENCOUNTER — OFFICE VISIT (OUTPATIENT)
Dept: NEUROLOGY | Facility: CLINIC | Age: 44
End: 2019-06-14
Payer: COMMERCIAL

## 2019-06-14 VITALS
SYSTOLIC BLOOD PRESSURE: 123 MMHG | OXYGEN SATURATION: 99 % | WEIGHT: 154.5 LBS | BODY MASS INDEX: 22.17 KG/M2 | HEART RATE: 77 BPM | DIASTOLIC BLOOD PRESSURE: 79 MMHG

## 2019-06-14 DIAGNOSIS — R56.9 CONVULSIONS, UNSPECIFIED CONVULSION TYPE (H): Primary | ICD-10-CM

## 2019-06-14 DIAGNOSIS — G40.909 SEIZURE DISORDER (H): ICD-10-CM

## 2019-06-14 PROCEDURE — 99214 OFFICE O/P EST MOD 30 MIN: CPT | Performed by: PSYCHIATRY & NEUROLOGY

## 2019-06-14 PROCEDURE — 80175 DRUG SCREEN QUAN LAMOTRIGINE: CPT | Mod: 90 | Performed by: PSYCHIATRY & NEUROLOGY

## 2019-06-14 PROCEDURE — 99000 SPECIMEN HANDLING OFFICE-LAB: CPT | Performed by: PSYCHIATRY & NEUROLOGY

## 2019-06-14 RX ORDER — LAMOTRIGINE 25 MG/1
75 TABLET ORAL 2 TIMES DAILY
Qty: 540 TABLET | Refills: 3 | Status: SHIPPED | OUTPATIENT
Start: 2019-06-14 | End: 2020-06-05

## 2019-06-14 NOTE — NURSING NOTE
Claude Higgins's goals for this visit include: return  He requests these members of his care team be copied on today's visit information:     PCP: No Ref-Primary, Physician    Referring Provider:  No referring provider defined for this encounter.    There were no vitals taken for this visit.    Do you need any medication refills at today's visit? Yes

## 2019-06-14 NOTE — PROGRESS NOTES
" NEUROLOGY PROGRESS NOTE   [facility]    Patient:Claude Penaloza  : 1975  Age: 44 year old  Today's Office Visit: 2019    Seizure History:      The patient's seizures started 2016.  He had 3 seizures in his life, the last one was 10/2016.  All his seizures were alcohol related.  He states he did binge drink the night before all his seizures.  He describes his seizure as below, he has had no auras, and \"I just lose consciousness.\"  Witnesses have reported shaking all over, making noises, and stiffening up.  He had urinary incontinence with his last seizure, denies tongue biting, major injuries or car accidents.  He tried levetiracetam, but it caused slow thinking and tiredness. Then he was started on lamotrigine, which he tolerates well.   Has a h/o amphetamine use, sober since  and heavy alcohol drinking.    History of present illness:     The patient did not have any seizures since last visit. Last seizure was 10/2016. He is taking lamotrigine 75 mg bid.   He drinks couple beers couple days a week. He feels after a hard work day it helps him relax. He has cut back on his drinking. He used to drink every day. When he had seizures he was mixing vodka with energy drink. His niece has epilepsy.   He has short-term memory loss, especially with names and times and appointments. His anxiety is better. Not taking Buspar anymore.    Current Outpatient Medications   Medication Sig Dispense Refill     cholecalciferol (VITAMIN D) 1000 UNIT tablet Take 1 tablet (1,000 Units) by mouth daily 90 tablet 3     ibuprofen (ADVIL) 200 MG capsule Take 200 mg by mouth 3 times daily as needed       lamoTRIgine (LAMICTAL) 25 MG tablet Take 3 tablets (75 mg) by mouth 2 times daily 540 tablet 3     Results for CLAUDE PENALOZA (MRN [id number]) as of 2019 08:13   Ref. Range 2018 08:43   Lamotrigine Level Latest Ref Range: 2.5 - 15.0 ug/mL 4.3     Review of Systems:  Lethargy / Tiredness:  No  Sleepiness:  " No  Nausea / Vomiting:  No  Blurred Vision:  No  Double Vision:  No  Depression:  No  Anxiety: is better  Slowed Cognitive Function:  No  Memory Problems:  Yes  Poor Balance:  No  Dizziness:  No  Appetite Changes:  No  Sleep Changes:  No  Headache: No  Behavioral Changes:  No  Rash: negative  Respiratory: No shortness of breath and No cough  Cardiovascular: negative  Have you experienced a traumatic fall related to your events: No  Are these falls related to your seizures: No      Exam:    /79 (BP Location: Right arm, Patient Position: Sitting, Cuff Size: Adult Regular)   Pulse 77   Wt 70.1 kg (154 lb 8 oz)   SpO2 99%   BMI 22.17 kg/m       Wt Readings from Last 5 Encounters:   06/14/19 70.1 kg (154 lb 8 oz)   12/14/18 71.6 kg (157 lb 12.8 oz)   06/08/18 69.9 kg (154 lb)   03/30/18 71.4 kg (157 lb 4.8 oz)   03/20/18 77.1 kg (170 lb)     General Appearance: Alert, awake, cooperative, pleasant, NAD  Gait and tandem gait: steady  Attention Span:  Normal  Language/speech: no aphasia or dysarthria  Extraocular Movements:  Normal  Coordination:  Normal FNF  Facial Strength:  Normal  Tongue Strength:  Normal  Motor Exam: normal tone, bulk and strength 5/5 bilaterally    Assessment/Plan:    1. Convulsions: Normal EEG and brain MRI. All his seizures were all alcohol related. He tells me he used to mix vodka with energy drink. He doesn't binge drink anymore and did cut back on his drinking to 2-3 times a week. He is taking lamotrigine 75 mg bid. His last level was 4.3. EEG and brain MRI were normal. He wants to continue taking lamotrigine and doesn't feel safe coming off of it.     2. Anxiety: his anxiety is better.     3. Memory problems: h/o amphetamine and alcohol abuse, multiple head injuries. Especially has trouble with people's names. I suggested doing a neuropsychlogical evaluation if symptoms are progressively getting worse.     - Continue lamotrigine 75 mg bid.  - Obtain lamotrigine level for efficacy and  compliance.  - RTC in 6 months.        As described above, I met with the patient for 25 minutes and during this time counseling was greater than 50% of the visit time.  Lorenza Hinson MD

## 2019-06-14 NOTE — LETTER
"    2019         RE: Claude Higgins  [address]  [facility] [address]        Dear Colleague,    Thank you for referring your patient, Claude Higgins, to the [facility]. Please see a copy of my visit note below.     NEUROLOGY PROGRESS NOTE   [facility]    Patient:Claude Higgins  : 1975  Age: 44 year old  Today's Office Visit: 2019    Seizure History:      The patient's seizures started 2016.  He had 3 seizures in his life, the last one was 10/2016.  All his seizures were alcohol related.  He states he did binge drink the night before all his seizures.  He describes his seizure as below, he has had no auras, and \"I just lose consciousness.\"  Witnesses have reported shaking all over, making noises, and stiffening up.  He had urinary incontinence with his last seizure, denies tongue biting, major injuries or car accidents.  He tried levetiracetam, but it caused slow thinking and tiredness. Then he was started on lamotrigine, which he tolerates well.   Has a h/o amphetamine use, sober since  and heavy alcohol drinking.    History of present illness:     The patient did not have any seizures since last visit. Last seizure was 10/2016. He is taking lamotrigine 75 mg bid.   He drinks couple beers couple days a week. He feels after a hard work day it helps him relax. He has cut back on his drinking. He used to drink every day. When he had seizures he was mixing vodka with energy drink. His niece has epilepsy.   He has short-term memory loss, especially with names and times and appointments. His anxiety is better. Not taking Buspar anymore.    Current Outpatient Medications   Medication Sig Dispense Refill     cholecalciferol (VITAMIN D) 1000 UNIT tablet Take 1 tablet (1,000 Units) by mouth daily 90 tablet 3     ibuprofen (ADVIL) 200 MG capsule Take 200 mg by mouth 3 times daily as needed       lamoTRIgine (LAMICTAL) 25 MG tablet Take 3 tablets (75 mg) by mouth 2 times daily " 540 tablet 3     Results for MIRELLA PENALOZA (MRN [id number]) as of 6/14/2019 08:13   Ref. Range 6/8/2018 08:43   Lamotrigine Level Latest Ref Range: 2.5 - 15.0 ug/mL 4.3     Review of Systems:  Lethargy / Tiredness:  No  Sleepiness:  No  Nausea / Vomiting:  No  Blurred Vision:  No  Double Vision:  No  Depression:  No  Anxiety: is better  Slowed Cognitive Function:  No  Memory Problems:  Yes  Poor Balance:  No  Dizziness:  No  Appetite Changes:  No  Sleep Changes:  No  Headache: No  Behavioral Changes:  No  Rash: negative  Respiratory: No shortness of breath and No cough  Cardiovascular: negative  Have you experienced a traumatic fall related to your events: No  Are these falls related to your seizures: No      Exam:    /79 (BP Location: Right arm, Patient Position: Sitting, Cuff Size: Adult Regular)   Pulse 77   Wt 70.1 kg (154 lb 8 oz)   SpO2 99%   BMI 22.17 kg/m        Wt Readings from Last 5 Encounters:   06/14/19 70.1 kg (154 lb 8 oz)   12/14/18 71.6 kg (157 lb 12.8 oz)   06/08/18 69.9 kg (154 lb)   03/30/18 71.4 kg (157 lb 4.8 oz)   03/20/18 77.1 kg (170 lb)     General Appearance: Alert, awake, cooperative, pleasant, NAD  Gait and tandem gait: steady  Attention Span:  Normal  Language/speech: no aphasia or dysarthria  Extraocular Movements:  Normal  Coordination:  Normal FNF  Facial Strength:  Normal  Tongue Strength:  Normal  Motor Exam: normal tone, bulk and strength 5/5 bilaterally    Assessment/Plan:    1. Convulsions: Normal EEG and brain MRI. All his seizures were all alcohol related. He tells me he used to mix vodka with energy drink. He doesn't binge drink anymore and did cut back on his drinking to 2-3 times a week. He is taking lamotrigine 75 mg bid. His last level was 4.3. EEG and brain MRI were normal. He wants to continue taking lamotrigine and doesn't feel safe coming off of it.     2. Anxiety: his anxiety is better.     3. Memory problems: h/o amphetamine and alcohol abuse, multiple  head injuries. Especially has trouble with people's names. I suggested doing a neuropsychlogical evaluation if symptoms are progressively getting worse.     - Continue lamotrigine 75 mg bid.  - Obtain lamotrigine level for efficacy and compliance.  - RTC in 6 months.        As described above, I met with the patient for 25 minutes and during this time counseling was greater than 50% of the visit time.  MD Lorenza Sigala MD

## 2019-06-15 LAB — LAMOTRIGINE SERPL-MCNC: 3.2 UG/ML (ref 2.5–15)

## 2019-10-01 ENCOUNTER — HEALTH MAINTENANCE LETTER (OUTPATIENT)
Age: 44
End: 2019-10-01

## 2019-12-13 ENCOUNTER — OFFICE VISIT (OUTPATIENT)
Dept: NEUROLOGY | Facility: CLINIC | Age: 44
End: 2019-12-13
Payer: COMMERCIAL

## 2019-12-13 VITALS
DIASTOLIC BLOOD PRESSURE: 83 MMHG | SYSTOLIC BLOOD PRESSURE: 141 MMHG | OXYGEN SATURATION: 98 % | TEMPERATURE: 98.2 F | WEIGHT: 160.5 LBS | RESPIRATION RATE: 18 BRPM | BODY MASS INDEX: 23.03 KG/M2 | HEART RATE: 80 BPM

## 2019-12-13 DIAGNOSIS — R56.9 CONVULSIONS, UNSPECIFIED CONVULSION TYPE (H): Primary | ICD-10-CM

## 2019-12-13 PROCEDURE — 99000 SPECIMEN HANDLING OFFICE-LAB: CPT | Performed by: PSYCHIATRY & NEUROLOGY

## 2019-12-13 PROCEDURE — 80175 DRUG SCREEN QUAN LAMOTRIGINE: CPT | Mod: 90 | Performed by: PSYCHIATRY & NEUROLOGY

## 2019-12-13 PROCEDURE — 99213 OFFICE O/P EST LOW 20 MIN: CPT | Performed by: PSYCHIATRY & NEUROLOGY

## 2019-12-13 ASSESSMENT — PAIN SCALES - GENERAL: PAINLEVEL: NO PAIN (0)

## 2019-12-13 NOTE — LETTER
"    2019         RE: Claude Higgins  [address]  [facility] [address]        Dear Colleague,    Thank you for referring your patient, Claude Higgins, to the [facility]. Please see a copy of my visit note below.     NEUROLOGY PROGRESS NOTE   [facility]    Patient:Claude Higgins  : 1975  Age: 44 year old  Today's Office Visit: 2019    Seizure History:      The patient's seizures started 2016.  He had 3 seizures in his life, the last one was 10/2016.  All his seizures were alcohol related.  He states he did binge drink the night before all his seizures.  He describes his seizure as below, he has had no auras, and \"I just lose consciousness.\"  Witnesses have reported shaking all over, making noises, and stiffening up.  He had urinary incontinence with his last seizure, denies tongue biting, major injuries or car accidents.  He tried levetiracetam, but it caused slow thinking and tiredness. Then he was started on lamotrigine, which he tolerates well.   Has a h/o amphetamine use, sober since  and heavy alcohol drinking.    History of present illness:     The patient is here for a follow up on his seizures. He did not have any seizures since last visit. He is taking lamotrigine 75 mg bid. He says he has some shakiness and tremors in his hands. He also has blisters in his mouth which is not new and he has had it for the past year. The seizures he has had were related to alcohol, actually he was mixing energy drink with alcohol, especially Vodka. He hasn't had any energy drink in the past couple years and he has cut back on his drinking and doesn't drink Vodka, he drinks couple bears every day.     Current Outpatient Medications   Medication Sig Dispense Refill     ibuprofen (ADVIL) 200 MG capsule Take 200 mg by mouth 3 times daily as needed       lamoTRIgine (LAMICTAL) 25 MG tablet Take 3 tablets (75 mg) by mouth 2 times daily 540 tablet 3     cholecalciferol (VITAMIN " D) 1000 UNIT tablet Take 1 tablet (1,000 Units) by mouth daily (Patient not taking: Reported on 12/13/2019) 90 tablet 3     Review of Systems:    Lethargy / Tiredness:  No  Sleepiness:  No  Nausea / Vomiting:  No  Blurred Vision:  Yes  Double Vision:  No  Depression:  No  Anxiety: No  Slowed Cognitive Function:  No  Memory Problems:  Yes  Poor Balance:  No  Dizziness:  No  Appetite Changes:  No  Sleep Changes:  No  Headache: No  Behavioral Changes:  No  Rash: negative  Respiratory: No shortness of breath and No cough  Cardiovascular: negative  Have you experienced a traumatic fall related to your events: No    Exam:    BP (!) 141/83 (BP Location: Left arm, Patient Position: Sitting, Cuff Size: Adult Large)   Pulse 80   Temp 98.2  F (36.8  C) (Oral)   Resp 18   Wt 72.8 kg (160 lb 8 oz)   SpO2 98%   BMI 23.03 kg/m        Wt Readings from Last 5 Encounters:   12/13/19 72.8 kg (160 lb 8 oz)   06/14/19 70.1 kg (154 lb 8 oz)   12/14/18 71.6 kg (157 lb 12.8 oz)   06/08/18 69.9 kg (154 lb)   03/30/18 71.4 kg (157 lb 4.8 oz)     General Appearance: Alert, awake, cooperative, pleasant, NAD  Gait and tandem gait: steady  Attention Span:  Normal  Language/speech: no aphasia or dysarthria  Extraocular Movements:  Normal  Coordination:  Normal FNF  Facial Strength:  Normal  Tongue Strength:  Normal  Motor Exam: normal tone, bulk and strength 5/5 bilaterally     Assessment/Plan:     1. Convulsions:  had 3 convulsions which were all alcohol related, especially binge drinking or mixing vodka with energy drink. He doesn't binge drink anymore  or mix energy drink with alcohol. EEG and brain MRI were normal. He complains of shakiness and tremors in his hands when he writes or do things, which waxes and wanes. I discussed taking him off lamotrigine or switching him to another medication, though he says symptoms are not that bothersome. His levels have always been on the lower side -3-4. He will let me know if symptoms get worse.       - Continue lamotrigine 75 mg bid.  - Obtain LOMT level   - RTC in 6 months.       As described above, I met with the patient for 20 minutes and during this time counseling was greater than 50% of the visit time.  Lorenza Hinson MD      Again, thank you for allowing me to participate in the care of your patient.        Sincerely,        Lorenza Hinson MD

## 2019-12-13 NOTE — LETTER
Date:December 16, 2019      Patient was self referred, no letter generated. Do not send.        [facility] Information

## 2019-12-13 NOTE — PROGRESS NOTES
" NEUROLOGY PROGRESS NOTE   [facility]    Patient:Claude Higgins  : 1975  Age: 44 year old  Today's Office Visit: 2019    Seizure History:      The patient's seizures started 2016.  He had 3 seizures in his life, the last one was 10/2016.  All his seizures were alcohol related.  He states he did binge drink the night before all his seizures.  He describes his seizure as below, he has had no auras, and \"I just lose consciousness.\"  Witnesses have reported shaking all over, making noises, and stiffening up.  He had urinary incontinence with his last seizure, denies tongue biting, major injuries or car accidents.  He tried levetiracetam, but it caused slow thinking and tiredness. Then he was started on lamotrigine, which he tolerates well.   Has a h/o amphetamine use, sober since  and heavy alcohol drinking.    History of present illness:     The patient is here for a follow up on his seizures. He did not have any seizures since last visit. He is taking lamotrigine 75 mg bid. He says he has some shakiness and tremors in his hands. He also has blisters in his mouth which is not new and he has had it for the past year. The seizures he has had were related to alcohol, actually he was mixing energy drink with alcohol, especially Vodka. He hasn't had any energy drink in the past couple years and he has cut back on his drinking and doesn't drink Vodka, he drinks couple bears every day.     Current Outpatient Medications   Medication Sig Dispense Refill     ibuprofen (ADVIL) 200 MG capsule Take 200 mg by mouth 3 times daily as needed       lamoTRIgine (LAMICTAL) 25 MG tablet Take 3 tablets (75 mg) by mouth 2 times daily 540 tablet 3     cholecalciferol (VITAMIN D) 1000 UNIT tablet Take 1 tablet (1,000 Units) by mouth daily (Patient not taking: Reported on 2019) 90 tablet 3     Review of Systems:    Lethargy / Tiredness:  No  Sleepiness:  No  Nausea / Vomiting:  No  Blurred Vision:  " Yes  Double Vision:  No  Depression:  No  Anxiety: No  Slowed Cognitive Function:  No  Memory Problems:  Yes  Poor Balance:  No  Dizziness:  No  Appetite Changes:  No  Sleep Changes:  No  Headache: No  Behavioral Changes:  No  Rash: negative  Respiratory: No shortness of breath and No cough  Cardiovascular: negative  Have you experienced a traumatic fall related to your events: No    Exam:    BP (!) 141/83 (BP Location: Left arm, Patient Position: Sitting, Cuff Size: Adult Large)   Pulse 80   Temp 98.2  F (36.8  C) (Oral)   Resp 18   Wt 72.8 kg (160 lb 8 oz)   SpO2 98%   BMI 23.03 kg/m       Wt Readings from Last 5 Encounters:   12/13/19 72.8 kg (160 lb 8 oz)   06/14/19 70.1 kg (154 lb 8 oz)   12/14/18 71.6 kg (157 lb 12.8 oz)   06/08/18 69.9 kg (154 lb)   03/30/18 71.4 kg (157 lb 4.8 oz)     General Appearance: Alert, awake, cooperative, pleasant, NAD  Gait and tandem gait: steady  Attention Span:  Normal  Language/speech: no aphasia or dysarthria  Extraocular Movements:  Normal  Coordination:  Normal FNF  Facial Strength:  Normal  Tongue Strength:  Normal  Motor Exam: normal tone, bulk and strength 5/5 bilaterally     Assessment/Plan:     1. Convulsions: had 3 convulsions which were all alcohol related, especially binge drinking or mixing vodka with energy drink. He doesn't binge drink anymore or mix energy drink with alcohol. EEG and brain MRI were normal. He complains of shakiness and tremors in his hands when he writes or do things, which waxes and wanes. I discussed taking him off lamotrigine or switching him to another medication, though he says symptoms are not that bothersome. His levels have always been on the lower side -3-4. He will let me know if symptoms get worse.      - Continue lamotrigine 75 mg bid.  - Obtain LOMT level   - RTC in 6 months.       As described above, I met with the patient for 20 minutes and during this time counseling was greater than 50% of the visit time.  Lorenza  MD Sravan

## 2019-12-13 NOTE — NURSING NOTE
Claude Higgins's goals for this visit include:   Chief Complaint   Patient presents with     RECHECK     return       He requests these members of his care team be copied on today's visit information: Yes    PCP: No Ref-Primary, Physician    Referring Provider:  No referring provider defined for this encounter.    BP (!) 141/83 (BP Location: Left arm, Patient Position: Sitting, Cuff Size: Adult Large)   Pulse 80   Temp 98.2  F (36.8  C) (Oral)   Resp 18   Wt 72.8 kg (160 lb 8 oz)   SpO2 98%   BMI 23.03 kg/m      Do you need any medication refills at today's visit? No    Brady Cox CMA

## 2019-12-14 LAB — LAMOTRIGINE SERPL-MCNC: 3.5 UG/ML (ref 2.5–15)

## 2020-06-05 ENCOUNTER — VIRTUAL VISIT (OUTPATIENT)
Dept: NEUROLOGY | Facility: CLINIC | Age: 45
End: 2020-06-05
Payer: COMMERCIAL

## 2020-06-05 DIAGNOSIS — G40.909 SEIZURE DISORDER (H): ICD-10-CM

## 2020-06-05 PROCEDURE — 99212 OFFICE O/P EST SF 10 MIN: CPT | Mod: 95 | Performed by: PSYCHIATRY & NEUROLOGY

## 2020-06-05 RX ORDER — LAMOTRIGINE 25 MG/1
75 TABLET ORAL 2 TIMES DAILY
Qty: 540 TABLET | Refills: 3 | Status: SHIPPED | OUTPATIENT
Start: 2020-06-05 | End: 2021-06-29

## 2020-06-05 NOTE — LETTER
Date:June 8, 2020      Patient was self referred, no letter generated. Do not send.        [facility] Information

## 2020-06-05 NOTE — PROGRESS NOTES
"Claude Higgins is a 45 year old male who is being evaluated via a billable video visit.      The patient has been notified of following:     \"This video visit will be conducted via a call between you and your physician/provider. We have found that certain health care needs can be provided without the need for an in-person physical exam.  This service lets us provide the care you need with a video conversation.  If a prescription is necessary we can send it directly to your pharmacy.  If lab work is needed we can place an order for that and you can then stop by our lab to have the test done at a later time.    Video visits are billed at different rates depending on your insurance coverage.  Please reach out to your insurance provider with any questions.    If during the course of the call the physician/provider feels a video visit is not appropriate, you will not be charged for this service.\"    Patient has given verbal consent for Video visit? Yes    How would you like to obtain your AVS? [employer]    Patient would like the video invitation sent by: Text to cell phone: [phone]    Will anyone else be joining your video visit?  Nadine Valentine LPN      Video-Visit Details    Type of service:  Video Visit    Video Start Time:8:20  Video End Time: 8:29    Originating Location (pt. Location): at work    Distant Location (provider location):  [facility]     Platform used for Video Visit: [facility]       NEUROLOGY PROGRESS NOTE   [facility]    Patient:Claude Higgins  : 1975  Age: 45 year old  Today's virtual Visit: 2020    The patient's seizures started 2016.  He had 3 seizures in his life, the last one was 10/2016.  All his seizures were alcohol related.  He states he did binge drink the night before all his seizures.  He describes his seizure as below, he has had no auras, and \"I just lose consciousness.\"  Witnesses have reported shaking all over, making noises, and stiffening " up.  He had urinary incontinence with his last seizure, denies tongue biting, major injuries or car accidents.  He tried levetiracetam, but it caused slow thinking and tiredness. Then he was started on lamotrigine, which he tolerates well.   Has a h/o amphetamine use, sober since 2003 and heavy alcohol drinking.     History of present illness:     Claude is participating in this virtual visit for follow-up on his seizures.  He was last seen in December 2019.  He did not have any seizures since then.  He is taking lamotrigine 75 mg twice a day.  He denies side effects such as imbalance, dizziness, tiredness, blurred vision, double vision or other issues    Current Outpatient Medications   Medication Sig Dispense Refill     lamoTRIgine (LAMICTAL) 25 MG tablet Take 3 tablets (75 mg) by mouth 2 times daily 540 tablet 3     cholecalciferol (VITAMIN D) 1000 UNIT tablet Take 1 tablet (1,000 Units) by mouth daily (Patient not taking: Reported on 12/13/2019) 90 tablet 3     ibuprofen (ADVIL) 200 MG capsule Take 200 mg by mouth 3 times daily as needed         Review of Systems:    Lethargy / Tiredness:  No  Sleepiness:  No  Blurred Vision:  No  Double Vision:  No  Slowed Cognitive Function:  No  Poor Balance:  No  Dizziness:  No      Assessment/Plan:     1. Convulsions: had 3 convulsions which were all alcohol related, especially binge drinking or mixing vodka with energy drink. He doesn't binge drink anymore or mix energy drink with alcohol. the last one was 10/2016. EEG and brain MRI were normal. He is taking lamotrigine 75 mg bid.    - Continue lamotrigine 75 mg bid.    - RTC in 6 months        As described above, I met with the patient face to face for 9 minutes and during this time I obtained history, discussed treatment plan and answered patient's questions.  Lorenza Hinson MD

## 2020-06-05 NOTE — LETTER
"    2020         RE: Claude Higgins  [address]  [place] [address]        Dear Colleague,    Thank you for referring your patient, Claude Higgins, to the [facility]. Please see a copy of my visit note below.    Claude Higgins is a 45 year old male who is being evaluated via a billable video visit.      The patient has been notified of following:     \"This video visit will be conducted via a call between you and your physician/provider. We have found that certain health care needs can be provided without the need for an in-person physical exam.  This service lets us provide the care you need with a video conversation.  If a prescription is necessary we can send it directly to your pharmacy.  If lab work is needed we can place an order for that and you can then stop by our lab to have the test done at a later time.    Video visits are billed at different rates depending on your insurance coverage.  Please reach out to your insurance provider with any questions.    If during the course of the call the physician/provider feels a video visit is not appropriate, you will not be charged for this service.\"    Patient has given verbal consent for Video visit? Yes    How would you like to obtain your AVS? MyChart    Patient would like the video invitation sent by: Text to cell phone: [phone]    Will anyone else be joining your video visit?  Nadine Valentine LPN      Video-Visit Details    Type of service:  Video Visit    Video Start Time:8:20  Video End Time: 8:29    Originating Location (pt. Location): at work    Distant Location (provider location):  [facility]     Platform used for Video Visit: Well       NEUROLOGY PROGRESS NOTE   [facility]    Patient:Claude Higgins  : 1975  Age: 45 year old  Today's virtual Visit: 2020    The patient's seizures started 2016.  He had 3 seizures in his life, the last one was 10/2016.  All his seizures were alcohol " "related.  He states he did binge drink the night before all his seizures.  He describes his seizure as below, he has had no auras, and \"I just lose consciousness.\"  Witnesses have reported shaking all over, making noises, and stiffening up.  He had urinary incontinence with his last seizure, denies tongue biting, major injuries or car accidents.  He tried levetiracetam, but it caused slow thinking and tiredness. Then he was started on lamotrigine, which he tolerates well.   Has a h/o amphetamine use, sober since 2003 and heavy alcohol drinking.     History of present illness:     Claude is participating in this virtual visit for follow-up on his seizures.  He was last seen in December 2019.  He did not have any seizures since then.  He is taking lamotrigine 75 mg twice a day.  He denies side effects such as imbalance, dizziness, tiredness, blurred vision, double vision or other issues    Current Outpatient Medications   Medication Sig Dispense Refill     lamoTRIgine (LAMICTAL) 25 MG tablet Take 3 tablets (75 mg) by mouth 2 times daily 540 tablet 3     cholecalciferol (VITAMIN D) 1000 UNIT tablet Take 1 tablet (1,000 Units) by mouth daily (Patient not taking: Reported on 12/13/2019) 90 tablet 3     ibuprofen (ADVIL) 200 MG capsule Take 200 mg by mouth 3 times daily as needed         Review of Systems:    Lethargy / Tiredness:  No  Sleepiness:  No  Blurred Vision:  No  Double Vision:  No  Slowed Cognitive Function:  No  Poor Balance:  No  Dizziness:  No      Assessment/Plan:     1. Convulsions: had 3 convulsions which were all alcohol related, especially binge drinking or mixing vodka with energy drink. He doesn't binge drink anymore or mix energy drink with alcohol. the last one was 10/2016. EEG and brain MRI were normal. He is taking lamotrigine 75 mg bid.    - Continue lamotrigine 75 mg bid.    - RTC in 6 months        As described above, I met with the patient face to face for 9 minutes and during this time I " obtained history, discussed treatment plan and answered patient's questions.  Lorenza Hinson MD      Again, thank you for allowing me to participate in the care of your patient.        Sincerely,        Lorenza Hinson MD

## 2020-09-01 NOTE — PROGRESS NOTES
"Claude Higgins is a 45 year old male who is being evaluated via a billable video visit.      The patient has been notified of following:     \"This telephone visit will be conducted via a call between you and your physician/provider. We have found that certain health care needs can be provided without the need for a physical exam.  This service lets us provide the care you need with a short phone conversation.  If a prescription is necessary we can send it directly to your pharmacy.  If lab work is needed we can place an order for that and you can then stop by our lab to have the test done at a later time.    Telephone visits are billed at different rates depending on your insurance coverage. During this emergency period, for some insurers they may be billed the same as an in-person visit.  Please reach out to your insurance provider with any questions.    If during the course of the call the physician/provider feels a telephone visit is not appropriate, you will not be charged for this service.\"    Patient has given verbal consent for Telephone visit?  Yes    What phone number would you like to be contacted at?     How would you like to obtain your AVS? Bridger Schmidt     Claude Higgins is a 45 year old male who presents today via video visit for the following health issues:    HPI  Patient states he no longer has diarrhea, headache, plugged ear or throat pain. He states it was only on Monday and Tuesday. He is only doing a visit because work needs him to be seen and he may possibly need a letter for work.    Acute Illness  Acute illness concerns: Diarrhea, Headache, Plugged ear, Throat Pain  Onset/Duration: Just on Monday and Tuesday  Symptoms:  Fever: no  Chills/Sweats: no  Headache (location?): YES  Sinus Pressure: no  Conjunctivitis:  no  Ear Pain: YES  Rhinorrhea: no  Congestion: no  Sore Throat: YES  Cough: no  Wheeze: no  Decreased Appetite: no  Nausea: no  Vomiting: no  Diarrhea: " YES  Dysuria/Freq.: no  Dysuria or Hematuria: no  Fatigue/Achiness: no  Sick/Strep Exposure: no  Therapies tried and outcome: Sudafed - Helped with symptoms.    Diarrhea Monday from 3 AM until 1130.    His Ear plugged up like sinuses.  If he swallowed his ear would make crunching noses.   He has a unilateral sore throat as well.   No fevers, chills.   No nausea or vomiting.   He thought maybe food poisoning.  Had some hamburger which may have been bad.  No one else ate the same food.   No loss of taste or smell.    No cough, shortness of breath.  No rashes.   No known sick contacts.     Review of Systems   Constitutional, HEENT, cardiovascular, pulmonary, gi and gu systems are negative, except as otherwise noted.      Objective           Vitals:  No vitals were obtained today due to virtual visit.     healthy, alert and no distress  PSYCH: Alert and oriented times 3; coherent speech, normal   rate and volume, able to articulate logical thoughts, able   to abstract reason, no tangential thoughts, no hallucinations   or delusions  His affect is normal  RESP: No cough, no audible wheezing, able to talk in full sentences  Remainder of exam unable to be completed due to telephone visits        No results found for this or any previous visit (from the past 24 hour(s)).        Assessment & Plan     Diagnoses and all orders for this visit:    Diarrhea, unspecified type  -     Symptomatic COVID-19 Virus (Coronavirus) by PCR; Future    Sore throat  -     Symptomatic COVID-19 Virus (Coronavirus) by PCR; Future      He is currently symptom free but work requires note or COVID testing.  At this point cannot exclude that he doesn't/didn't have covid therefore recommended testing, he is ok with this.  He will get fax number so we can fax results or note when the results are available.     Return in about 3 days (around 9/5/2020) for Pending results.     Options for treatment and follow-up care were reviewed with the patient and/or  guardian. Patient and/or guardian engaged in the decision making process and verbalized understanding of the options discussed and agreed with the final plan.     Marianna Juarez PA-C  [facility]      Phone call duration:  5 minutes

## 2020-09-02 ENCOUNTER — VIRTUAL VISIT (OUTPATIENT)
Dept: FAMILY MEDICINE | Facility: OTHER | Age: 45
End: 2020-09-02
Payer: COMMERCIAL

## 2020-09-02 DIAGNOSIS — J02.9 SORE THROAT: ICD-10-CM

## 2020-09-02 DIAGNOSIS — R19.7 DIARRHEA, UNSPECIFIED TYPE: Primary | ICD-10-CM

## 2020-09-02 PROCEDURE — 99213 OFFICE O/P EST LOW 20 MIN: CPT | Mod: 95 | Performed by: PHYSICIAN ASSISTANT

## 2020-09-02 NOTE — PROGRESS NOTES
"Claude Higgins is a 45 year old male who is being evaluated via a billable telephone visit.      The patient has been notified of following:     \"This telephone visit will be conducted via a call between you and your physician/provider. We have found that certain health care needs can be provided without the need for a physical exam.  This service lets us provide the care you need with a short phone conversation.  If a prescription is necessary we can send it directly to your pharmacy.  If lab work is needed we can place an order for that and you can then stop by our lab to have the test done at a later time.    Telephone visits are billed at different rates depending on your insurance coverage. During this emergency period, for some insurers they may be billed the same as an in-person visit.  Please reach out to your insurance provider with any questions.    If during the course of the call the physician/provider feels a telephone visit is not appropriate, you will not be charged for this service.\"    Patient has given verbal consent for Telephone visit?  {YES-NO  Default Yes:4444::\"Yes\"}    What phone number would you like to be contacted at? ***    How would you like to obtain your AVS? {AVS Preference:010700}    Subjective     Claude Higgins is a 45 year old male who presents via phone visit today for the following health issues:    HPI    {SUPERLIST (Optional):690200}  {PEDS Chronic and Acute Problems (Optional):349953}     {additonal problems for provider to add (Optional):164437}    Review of Systems   {ROS COMP (Optional):879874}       Objective          Vitals:  No vitals were obtained today due to virtual visit.    {GENERAL APPEARANCE:50::\"healthy\",\"alert\",\"no distress\"}  PSYCH: Alert and oriented times 3; coherent speech, normal   rate and volume, able to articulate logical thoughts, able   to abstract reason, no tangential thoughts, no hallucinations   or delusions  His affect is { " ":6546963::\"normal\"}  RESP: No cough, no audible wheezing, able to talk in full sentences  Remainder of exam unable to be completed due to telephone visits    {Diagnostic Test Results (Optional):626987}        Assessment/Plan:    {PROVIDER CHARTING PREFERENCE SOAPO:563868}    Phone call duration:  *** minutes                "

## 2020-09-03 DIAGNOSIS — R19.7 DIARRHEA, UNSPECIFIED TYPE: ICD-10-CM

## 2020-09-03 DIAGNOSIS — J02.9 SORE THROAT: ICD-10-CM

## 2020-09-03 PROCEDURE — U0003 INFECTIOUS AGENT DETECTION BY NUCLEIC ACID (DNA OR RNA); SEVERE ACUTE RESPIRATORY SYNDROME CORONAVIRUS 2 (SARS-COV-2) (CORONAVIRUS DISEASE [COVID-19]), AMPLIFIED PROBE TECHNIQUE, MAKING USE OF HIGH THROUGHPUT TECHNOLOGIES AS DESCRIBED BY CMS-2020-01-R: HCPCS | Performed by: PHYSICIAN ASSISTANT

## 2020-09-04 LAB
SARS-COV-2 RNA SPEC QL NAA+PROBE: NOT DETECTED
SPECIMEN SOURCE: NORMAL

## 2020-09-07 ENCOUNTER — NURSE TRIAGE (OUTPATIENT)
Dept: NURSING | Facility: CLINIC | Age: 45
End: 2020-09-07

## 2020-09-07 NOTE — TELEPHONE ENCOUNTER
Pt is calling.    COVID-19 results.    He ate some bad hamburger and ended up with diarrhea. His boss wanted him to be tested.   Results reviewed with him, as well as the COVID recommendations.  All questions answered. He is aware to call back with any further concerns or questions.     Coronavirus (COVID-19) Notification    Lab Result   Lab test 2019-nCoV rRt-PCR OR SARS-COV-2 PCR    Nasopharyngeal AND/OR Oropharyngeal swab is NEGATIVE for 2019-nCoV RNA [OR] SARS-COV-2 RNA (COVID-19) RNA    Your result was negative. This means that we didn't find the virus that causes COVID-19 in your sample. A test may show negative when you do actually have the virus. This can happen when the virus is in the early stages of infection, before you feel illness symptoms.    If you have symptoms   Stay home and away from others (self-isolate) until you meet ALL of the guidelines below:    You've had no fever--and no medicine that reduces fever--for 1 full day (24 hours). And      Your other symptoms have gotten better. For example, your cough or breathing has improved. And   ; At least 10 days have passed since your symptoms started. (If you've been told by a doctor that you have a weak immune system, wait 20 days.)         During this time:    Stay home. Don't go to work, school or anywhere else.     Stay in your own room, including for meals. Use your own bathroom if you can.    Stay away from others in your home. No hugging, kissing or shaking hands. No visitors.    Clean  high touch  surfaces often (doorknobs, counters, handles, etc.). Use a household cleaning spray or wipes. You can find a full list on the EPA website at www.epa.gov/pesticide-registration/list-n-disinfectants-use-against-sars-cov-2.    Cover your mouth and nose with a mask, tissue or other face covering to avoid spreading germs.    Wash your hands and face often with soap and water.    Going back to work  Check with your employer for any guidelines to follow for  going back to work.  You are sent a letter for your Employer which will serve as formal document notice that you, the employee, tested negative for COVID-19, as of the testing date shown above.    If your symptoms worsen or other concerning symptoms, contact PCP, oncare or consider returning to Emergency Dept.    Where can I get more information?    [facility]: [email or web address]    Coronavirus Basics: www.health.[facility].[place]/diseases/coronavirus/basics.html    [facility] Hotline (550-311-0082)    Jessica Chavez RN  [facility] Triage Nurse Advisor  9/7/2020 at 4:43 PM

## 2021-06-03 ENCOUNTER — APPOINTMENT (OUTPATIENT)
Dept: GENERAL RADIOLOGY | Facility: CLINIC | Age: 46
End: 2021-06-03
Attending: FAMILY MEDICINE
Payer: COMMERCIAL

## 2021-06-03 ENCOUNTER — HOSPITAL ENCOUNTER (EMERGENCY)
Facility: CLINIC | Age: 46
Discharge: HOME | End: 2021-06-03
Attending: FAMILY MEDICINE | Admitting: FAMILY MEDICINE
Payer: COMMERCIAL

## 2021-06-03 VITALS
RESPIRATION RATE: 18 BRPM | HEART RATE: 73 BPM | OXYGEN SATURATION: 98 % | TEMPERATURE: 97.9 F | WEIGHT: 165 LBS | BODY MASS INDEX: 23.68 KG/M2 | DIASTOLIC BLOOD PRESSURE: 96 MMHG | SYSTOLIC BLOOD PRESSURE: 156 MMHG

## 2021-06-03 DIAGNOSIS — S92.251A CLOSED DISPLACED FRACTURE OF NAVICULAR BONE OF RIGHT FOOT, INITIAL ENCOUNTER: ICD-10-CM

## 2021-06-03 PROCEDURE — 73610 X-RAY EXAM OF ANKLE: CPT | Mod: RT

## 2021-06-03 PROCEDURE — 73630 X-RAY EXAM OF FOOT: CPT | Mod: RT

## 2021-06-03 PROCEDURE — 99284 EMERGENCY DEPT VISIT MOD MDM: CPT | Mod: 25 | Performed by: FAMILY MEDICINE

## 2021-06-03 PROCEDURE — 28450 TX TARSAL B1 FX W/O MNPJ EA: CPT | Mod: 54 | Performed by: FAMILY MEDICINE

## 2021-06-03 PROCEDURE — 28450 TX TARSAL B1 FX W/O MNPJ EA: CPT | Mod: RT | Performed by: FAMILY MEDICINE

## 2021-06-03 RX ORDER — OXYCODONE AND ACETAMINOPHEN 5; 325 MG/1; MG/1
1 TABLET ORAL EVERY 6 HOURS PRN
Qty: 12 TABLET | Refills: 0 | Status: SHIPPED | OUTPATIENT
Start: 2021-06-03 | End: 2021-06-08

## 2021-06-03 RX ORDER — HYDROCODONE BITARTRATE AND ACETAMINOPHEN 5; 325 MG/1; MG/1
1 TABLET ORAL EVERY 6 HOURS PRN
Qty: 10 TABLET | Refills: 0 | Status: SHIPPED | OUTPATIENT
Start: 2021-06-03 | End: 2021-07-13
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
## 2021-06-03 NOTE — DISCHARGE INSTRUCTIONS
You have a fractured navicula of your foot.  You were in a splint and should not bear any weight on this.  Crutches.  Elevate.  Ibuprofen for discomfort.  Percocet for severe pain only.  You must follow-up with  podiatrist--orthopedic foot and ankle specialist --next week.

## 2021-06-03 NOTE — LETTER
[facility] EMERGENCY DEPT  [address] [place] [address]  Phone: [phone]  Fax: [phone]    Marybel 3, 2021        Claude Higgins  [address]   [facility] [address]          To whom it may concern:    RE: Claude Higgins    Patient was seen and treated today at our emergency department.  He has a fractured foot and needs to be in a splint and crutches and no weightbearing.  He will be following up with a specialist next week.  Further work restrictions per the specialist.          Sincerely,      Manuel Elliott MD

## 2021-06-03 NOTE — ED TRIAGE NOTES
"Multiple injuries over the last 2 weeks to the same right ankle.  C/o right ankle pain, \"clicking\" and swelling.   "

## 2021-06-08 ENCOUNTER — OFFICE VISIT (OUTPATIENT)
Dept: PODIATRY | Facility: CLINIC | Age: 46
End: 2021-06-08
Payer: COMMERCIAL

## 2021-06-08 VITALS
HEIGHT: 70 IN | WEIGHT: 165 LBS | BODY MASS INDEX: 23.62 KG/M2 | SYSTOLIC BLOOD PRESSURE: 120 MMHG | DIASTOLIC BLOOD PRESSURE: 72 MMHG

## 2021-06-08 DIAGNOSIS — M24.571 EQUINUS CONTRACTURE OF RIGHT ANKLE: ICD-10-CM

## 2021-06-08 DIAGNOSIS — Z87.81 HISTORY OF FRACTURE OF TIBIA: ICD-10-CM

## 2021-06-08 DIAGNOSIS — Z87.39 HISTORY OF OSTEOMYELITIS: ICD-10-CM

## 2021-06-08 DIAGNOSIS — S92.254A CLOSED NONDISPLACED FRACTURE OF NAVICULAR BONE OF RIGHT FOOT, INITIAL ENCOUNTER: Primary | ICD-10-CM

## 2021-06-08 PROCEDURE — 99204 OFFICE O/P NEW MOD 45 MIN: CPT | Mod: 57 | Performed by: PODIATRIST

## 2021-06-08 PROCEDURE — 28450 TX TARSAL B1 FX W/O MNPJ EA: CPT | Mod: 55 | Performed by: PODIATRIST

## 2021-06-08 ASSESSMENT — PAIN SCALES - GENERAL: PAINLEVEL: MILD PAIN (2)

## 2021-06-08 ASSESSMENT — MIFFLIN-ST. JEOR: SCORE: 1634.69

## 2021-06-08 NOTE — LETTER
June 8, 2021        Claude Higgins  [address]   [facility] [address]          To whom it may concern:    RE: Claude Higgins    Patient was seen and treated today at our clinic. He will be non weight bearing in a cast, must be light duty seated work only. If this is not available than no work for 6 weeks.    Please contact me for questions or concerns.      Sincerely,        Larry Bonds DPM

## 2021-06-08 NOTE — PROGRESS NOTES
HPI:  Claude Higgins is a 46 year old male who is seen in consultation at the request of ED DEPT - Manuel Elliott MD.    Pt presents for eval of:   (Onset, Location, L/R, Character, Treatments, Injury if yes)    XR Right foot and ankle 6/3/2021     Onset 5/23/2021, rolled Right foot under while in go cart. 5/29/2021, fell off a tractor tire and rolled ankle after stepping into a hole. Presents today NWB w/ splint and crutches, dorsal Right foot pain.   7/28/2003 ORIF Right tib/fib surgery and 2008 removed hardware.  Constant, swelling, bruising, dull ache, pain 2  Rest, elevation, ibuprofen, noroc    Is about 16 days post injury.     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    Previous right ankle open tib fib from a 6 foot fall 2003.  And now this navicular injury.     ROS:  10 point ROS neg other than the symptoms noted above in the HPI.    Patient Active Problem List   Diagnosis     Hyperlipidemia LDL goal <160     Tobacco use disorder     Unilateral inguinal hernia without obstruction or gangrene, recurrence not specified     Alcohol use     GERD (gastroesophageal reflux disease)     Cannabis abuse     Seizure disorder (H)     Shoulder strain, right, subsequent encounter     hx open tib-fib fracture 2003     History of osteomyelitis 2003     Closed nondisplaced fracture of navicular bone of right foot, initial encounter       PAST MEDICAL HISTORY:   Past Medical History:   Diagnosis Date     Alcohol use     Out-patient chemical dependancy treatment in 2003     GERD (gastroesophageal reflux disease)      Inguinal hernia 2015    left     Tobacco abuse         PAST SURGICAL HISTORY:   Past Surgical History:   Procedure Laterality Date     HERNIA REPAIR, INGUINAL RT/LT Right 1976     HERNIORRHAPHY INGUINAL Left 7/10/2015    Procedure: HERNIORRHAPHY INGUINAL;  Surgeon: Vicente Aleman MD;  Location: [facility] OR     SURGICAL HISTORY OF -  Right 7/28/2003-04    Tib/fib,  "ORIF, bone and skin grafting and arterial bypass     SURGICAL HISTORY OF -  Right 2008    Tib/fib, hardware removal.      TESTICLE SURGERY Right 1993    Torsion correction, and prev. undescended        MEDICATIONS:   Current Outpatient Medications:      HYDROcodone-acetaminophen (NORCO) 5-325 MG tablet, Take 1 tablet by mouth every 6 hours as needed for severe pain, Disp: 10 tablet, Rfl: 0     ibuprofen (ADVIL) 200 MG capsule, Take 200 mg by mouth 3 times daily as needed, Disp: , Rfl:      lamoTRIgine (LAMICTAL) 25 MG tablet, Take 3 tablets (75 mg) by mouth 2 times daily, Disp: 540 tablet, Rfl: 3     ALLERGIES:    Allergies   Allergen Reactions     Penicillin G Other (See Comments)     \"i am given a generic PCN every 5 yr or so when I have strep throat and I do fine with that. I don't think I am allergic to PCN.        SOCIAL HISTORY:   Social History     Socioeconomic History     Marital status: Single     Spouse name: Not on file     Number of children: 0     Years of education: 12     Highest education level: Not on file   Occupational History     Occupation: Construction     Comment: [employer]   Social Needs     Financial resource strain: Not on file     Food insecurity     Worry: Not on file     Inability: Not on file     Transportation needs     Medical: Not on file     Non-medical: Not on file   Tobacco Use     Smoking status: Current Every Day Smoker     Packs/day: 1.00     Years: 17.00     Pack years: 17.00     Types: Cigarettes     Smokeless tobacco: Current User   Substance and Sexual Activity     Alcohol use: No     Alcohol/week: 0.0 standard drinks     Comment: occasional.  \"started having alcohol seizures so I quit\"     Drug use: Yes     Types: Marijuana     Comment: occasionally marijuana- patient states a couple times a month.     Sexual activity: Yes     Partners: Female   Lifestyle     Physical activity     Days per week: Not on file     Minutes per session: Not on file     Stress: Not on file " "  Relationships     Social connections     Talks on phone: Not on file     Gets together: Not on file     Attends Church service: Not on file     Active member of club or organization: Not on file     Attends meetings of clubs or organizations: Not on file     Relationship status: Not on file     Intimate partner violence     Fear of current or ex partner: Not on file     Emotionally abused: Not on file     Physically abused: Not on file     Forced sexual activity: Not on file   Other Topics Concern     Parent/sibling w/ CABG, MI or angioplasty before 65F 55M? No   Social History Narrative     Not on file        FAMILY HISTORY:   Family History   Problem Relation Age of Onset     Myocardial Infarction Father      Myocardial Infarction Maternal Grandfather      Myocardial Infarction Paternal Grandfather         EXAM:Vitals: /72 (BP Location: Left arm, Patient Position: Sitting, Cuff Size: Adult Regular)   Ht 1.778 m (5' 10\")   Wt 74.8 kg (165 lb)   BMI 23.68 kg/m    BMI= Body mass index is 23.68 kg/m .    General appearance: Patient is alert and fully cooperative with history & exam.  No sign of distress is noted during the visit.     Psychiatric: Affect is pleasant & appropriate.  Patient appears motivated to improve health.     Respiratory: Breathing is regular & unlabored while sitting.     HEENT: Hearing is intact to spoken word.  Speech is clear.  No gross evidence of visual impairment that would impact ambulation.     Vascular: DP & PT pulses are intact & regular bilaterally.  No significant edema or varicosities noted.  CFT and skin temperature is normal to both lower extremities.     Neurologic: Lower extremity sensation is intact to light touch.  No evidence of weakness or contracture in the lower extremities.  No evidence of neuropathy.    Dermatologic: Skin is intact to both lower extremities with adequate texture, turgor and tone about the integument.  No paronychia or evidence of soft tissue " infection is noted.     Musculoskeletal: Patient is ambulatory with posterior splint and crutches.  Multiple cicatrix noted about his distal right leg that is healed with a full-thickness flap about his posterior medial right leg.  Today all of the symptoms are noted about the navicular of the right ankle.  Pain with direct palpation to the navicular.    Radiographs: Dorsal avulsion fracture of the dorsal navicular with minimal displacement about 2 mm or less.  This is an intra-articular fracture      ASSESSMENT:       ICD-10-CM    1. Closed nondisplaced fracture of navicular bone of right foot, initial encounter  S92.254A APPLY SHORT LEG CAST     CAST SUPPLY S LEG ADULT FIBER   2. hx open tib-fib fracture 2003  Z87.81    3. History of osteomyelitis 2003  Z87.39    4. Equinus contracture of right ankle  M24.571         PLAN:  Reviewed [facility]'s [facility] in [facility].      6/8/2021   Interpreted radiographs  Discussed best outcome would be provide fixation to this fracture as it is intra-articular and a good portion or percentage of the talar head and navicular joint.  However this patient refuses any surgical intervention as he is had multiple procedures about his right leg with a muscle flap and states he reacted to hardware and then developed osteomyelitis requiring IV antibiotics multiple times over a 2-year.  We discussed the fact that his ankle likely has less movement and motion longevity because of the previous tib-fib open fracture ORIF osteomyelitis and he admits that he does not want to move forward with any reduction or fixation.  Therefore he was placed in a 4 roll fiberglass below the knee cast with the ankle at 90.  He was instructed to remain nonweightbearing.  Patient refuses any further narcotics today as he has a history of addiction.  Patient can continue light duty mostly seated work as tolerated on crutches.  Follow-up again in 3 weeks and we will replace the cast remove it and reapply it then at 5-6  weeks of cast we will remove the cast and image it.  All questions were answered.    Larry Bonds DPM

## 2021-06-08 NOTE — LETTER
6/8/2021         RE: Claude Higgins  [address]  [facility] [address]        Dear Colleague,    Thank you for referring your patient, Claude Higgins, to the [facility]. Please see a copy of my visit note below.    HPI:  Claude Higgins is a 46 year old male who is seen in consultation at the request of ED DEPT - Manuel Elliott MD.    Pt presents for eval of:   (Onset, Location, L/R, Character, Treatments, Injury if yes)    XR Right foot and ankle 6/3/2021     Onset 5/23/2021, rolled Right foot under while in go cart. 5/29/2021, fell off a tractor tire and rolled ankle after stepping into a hole. Presents today NWB w/ splint and crutches, dorsal Right foot pain.   7/28/2003 ORIF Right tib/fib surgery and 2008 removed hardware.  Constant, swelling, bruising, dull ache, pain 2  Rest, elevation, ibuprofen, noroc    Is about 16 days post injury.     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    Previous right ankle open tib fib from a 6 foot fall 2003.  And now this navicular injury.     ROS:  10 point ROS neg other than the symptoms noted above in the HPI.    Patient Active Problem List   Diagnosis     Hyperlipidemia LDL goal <160     Tobacco use disorder     Unilateral inguinal hernia without obstruction or gangrene, recurrence not specified     Alcohol use     GERD (gastroesophageal reflux disease)     Cannabis abuse     Seizure disorder (H)     Shoulder strain, right, subsequent encounter     hx open tib-fib fracture 2003     History of osteomyelitis 2003     Closed nondisplaced fracture of navicular bone of right foot, initial encounter       PAST MEDICAL HISTORY:   Past Medical History:   Diagnosis Date     Alcohol use     Out-patient chemical dependancy treatment in 2003     GERD (gastroesophageal reflux disease)      Inguinal hernia 2015    left     Tobacco abuse         PAST SURGICAL HISTORY:   Past Surgical History:   Procedure  "Laterality Date     HERNIA REPAIR, INGUINAL RT/LT Right 1976     HERNIORRHAPHY INGUINAL Left 7/10/2015    Procedure: HERNIORRHAPHY INGUINAL;  Surgeon: Vicente Aleman MD;  Location: [facility]     SURGICAL HISTORY OF -  Right 7/28/2003-04    Tib/fib, ORIF, bone and skin grafting and arterial bypass     SURGICAL HISTORY OF -  Right 2008    Tib/fib, hardware removal.      TESTICLE SURGERY Right 1993    Torsion correction, and prev. undescended        MEDICATIONS:   Current Outpatient Medications:      HYDROcodone-acetaminophen (NORCO) 5-325 MG tablet, Take 1 tablet by mouth every 6 hours as needed for severe pain, Disp: 10 tablet, Rfl: 0     ibuprofen (ADVIL) 200 MG capsule, Take 200 mg by mouth 3 times daily as needed, Disp: , Rfl:      lamoTRIgine (LAMICTAL) 25 MG tablet, Take 3 tablets (75 mg) by mouth 2 times daily, Disp: 540 tablet, Rfl: 3     ALLERGIES:    Allergies   Allergen Reactions     Penicillin G Other (See Comments)     \"i am given a generic PCN every 5 yr or so when I have strep throat and I do fine with that. I don't think I am allergic to PCN.        SOCIAL HISTORY:   Social History     Socioeconomic History     Marital status: Single     Spouse name: Not on file     Number of children: 0     Years of education: 12     Highest education level: Not on file   Occupational History     Occupation: Construction     Comment: [employer]   Social Needs     Financial resource strain: Not on file     Food insecurity     Worry: Not on file     Inability: Not on file     Transportation needs     Medical: Not on file     Non-medical: Not on file   Tobacco Use     Smoking status: Current Every Day Smoker     Packs/day: 1.00     Years: 17.00     Pack years: 17.00     Types: Cigarettes     Smokeless tobacco: Current User   Substance and Sexual Activity     Alcohol use: No     Alcohol/week: 0.0 standard drinks     Comment: occasional.  \"started having alcohol seizures so I quit\"     Drug use: Yes     Types: Marijuana     " "Comment: occasionally marijuana- patient states a couple times a month.     Sexual activity: Yes     Partners: Female   Lifestyle     Physical activity     Days per week: Not on file     Minutes per session: Not on file     Stress: Not on file   Relationships     Social connections     Talks on phone: Not on file     Gets together: Not on file     Attends Muslim service: Not on file     Active member of club or organization: Not on file     Attends meetings of clubs or organizations: Not on file     Relationship status: Not on file     Intimate partner violence     Fear of current or ex partner: Not on file     Emotionally abused: Not on file     Physically abused: Not on file     Forced sexual activity: Not on file   Other Topics Concern     Parent/sibling w/ CABG, MI or angioplasty before 65F 55M? No   Social History Narrative     Not on file        FAMILY HISTORY:   Family History   Problem Relation Age of Onset     Myocardial Infarction Father      Myocardial Infarction Maternal Grandfather      Myocardial Infarction Paternal Grandfather         EXAM:Vitals: /72 (BP Location: Left arm, Patient Position: Sitting, Cuff Size: Adult Regular)   Ht 1.778 m (5' 10\")   Wt 74.8 kg (165 lb)   BMI 23.68 kg/m    BMI= Body mass index is 23.68 kg/m .    General appearance: Patient is alert and fully cooperative with history & exam.  No sign of distress is noted during the visit.     Psychiatric: Affect is pleasant & appropriate.  Patient appears motivated to improve health.     Respiratory: Breathing is regular & unlabored while sitting.     HEENT: Hearing is intact to spoken word.  Speech is clear.  No gross evidence of visual impairment that would impact ambulation.     Vascular: DP & PT pulses are intact & regular bilaterally.  No significant edema or varicosities noted.  CFT and skin temperature is normal to both lower extremities.     Neurologic: Lower extremity sensation is intact to light touch.  No " evidence of weakness or contracture in the lower extremities.  No evidence of neuropathy.    Dermatologic: Skin is intact to both lower extremities with adequate texture, turgor and tone about the integument.  No paronychia or evidence of soft tissue infection is noted.     Musculoskeletal: Patient is ambulatory with posterior splint and crutches.  Multiple cicatrix noted about his distal right leg that is healed with a full-thickness flap about his posterior medial right leg.  Today all of the symptoms are noted about the navicular of the right ankle.  Pain with direct palpation to the navicular.    Radiographs: Dorsal avulsion fracture of the dorsal navicular with minimal displacement about 2 mm or less.  This is an intra-articular fracture      ASSESSMENT:       ICD-10-CM    1. Closed nondisplaced fracture of navicular bone of right foot, initial encounter  S92.254A APPLY SHORT LEG CAST     CAST SUPPLY S LEG ADULT FIBER   2. hx open tib-fib fracture 2003  Z87.81    3. History of osteomyelitis 2003  Z87.39    4. Equinus contracture of right ankle  M24.571         PLAN:  Reviewed patient's chart in [facility].      6/8/2021   Interpreted radiographs  Discussed best outcome would be provide fixation to this fracture as it is intra-articular and a good portion or percentage of the talar head and navicular joint.  However this patient refuses any surgical intervention as he is had multiple procedures about his right leg with a muscle flap and states he reacted to hardware and then developed osteomyelitis requiring IV antibiotics multiple times over a 2-year.  We discussed the fact that his ankle likely has less movement and motion longevity because of the previous tib-fib open fracture ORIF osteomyelitis and he admits that he does not want to move forward with any reduction or fixation.  Therefore he was placed in a 4 roll fiberglass below the knee cast with the ankle at 90.  He was instructed to remain nonweightbearing.   Patient refuses any further narcotics today as he has a history of addiction.  Patient can continue light duty mostly seated work as tolerated on crutches.  Follow-up again in 3 weeks and we will replace the cast remove it and reapply it then at 5-6 weeks of cast we will remove the cast and image it.  All questions were answered.    Larry Bonds DPM              Again, thank you for allowing me to participate in the care of your patient.        Sincerely,        Larry Bonds DPM

## 2021-06-29 ENCOUNTER — ANCILLARY PROCEDURE (OUTPATIENT)
Dept: GENERAL RADIOLOGY | Facility: CLINIC | Age: 46
End: 2021-06-29
Attending: PODIATRIST
Payer: COMMERCIAL

## 2021-06-29 ENCOUNTER — OFFICE VISIT (OUTPATIENT)
Dept: PODIATRY | Facility: CLINIC | Age: 46
End: 2021-06-29
Payer: COMMERCIAL

## 2021-06-29 VITALS
HEIGHT: 70 IN | BODY MASS INDEX: 23.62 KG/M2 | WEIGHT: 165 LBS | SYSTOLIC BLOOD PRESSURE: 124 MMHG | DIASTOLIC BLOOD PRESSURE: 60 MMHG

## 2021-06-29 DIAGNOSIS — S92.515A CLOSED NONDISPLACED FRACTURE OF PROXIMAL PHALANX OF LESSER TOE OF LEFT FOOT, INITIAL ENCOUNTER: ICD-10-CM

## 2021-06-29 DIAGNOSIS — S92.254A CLOSED NONDISPLACED FRACTURE OF NAVICULAR BONE OF RIGHT FOOT, INITIAL ENCOUNTER: ICD-10-CM

## 2021-06-29 DIAGNOSIS — S92.254A CLOSED NONDISPLACED FRACTURE OF NAVICULAR BONE OF RIGHT FOOT, INITIAL ENCOUNTER: Primary | ICD-10-CM

## 2021-06-29 DIAGNOSIS — R52 PAIN: ICD-10-CM

## 2021-06-29 PROCEDURE — 73660 X-RAY EXAM OF TOE(S): CPT | Mod: TC | Performed by: RADIOLOGY

## 2021-06-29 PROCEDURE — 73630 X-RAY EXAM OF FOOT: CPT | Mod: TC | Performed by: RADIOLOGY

## 2021-06-29 PROCEDURE — 99207 PR FRACTURE CARE IN GLOBAL PERIOD: CPT | Performed by: PODIATRIST

## 2021-06-29 ASSESSMENT — MIFFLIN-ST. JEOR: SCORE: 1634.69

## 2021-06-29 NOTE — NURSING NOTE
Right fracture boot applied and patient given verbal and written instructions. DME order signed.  Left post-op shoe applied and verbal and written instructions given to patient.    ........DIDI Plaza CMA  ([facility])
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
## 2021-07-02 ENCOUNTER — OFFICE VISIT (OUTPATIENT)
Dept: NEUROLOGY | Facility: CLINIC | Age: 46
End: 2021-07-02
Payer: COMMERCIAL

## 2021-07-02 VITALS
HEART RATE: 70 BPM | SYSTOLIC BLOOD PRESSURE: 133 MMHG | BODY MASS INDEX: 23.16 KG/M2 | DIASTOLIC BLOOD PRESSURE: 82 MMHG | WEIGHT: 161.4 LBS

## 2021-07-02 DIAGNOSIS — G40.909 SEIZURE DISORDER (H): ICD-10-CM

## 2021-07-02 DIAGNOSIS — R56.9 CONVULSIONS, UNSPECIFIED CONVULSION TYPE (H): Primary | ICD-10-CM

## 2021-07-02 PROCEDURE — 99000 SPECIMEN HANDLING OFFICE-LAB: CPT | Performed by: PSYCHIATRY & NEUROLOGY

## 2021-07-02 PROCEDURE — 99213 OFFICE O/P EST LOW 20 MIN: CPT | Performed by: PSYCHIATRY & NEUROLOGY

## 2021-07-02 PROCEDURE — 80175 DRUG SCREEN QUAN LAMOTRIGINE: CPT | Mod: 90 | Performed by: PSYCHIATRY & NEUROLOGY
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
## 2021-07-02 NOTE — LETTER
Date:July 13, 2021      Patient was self referred, no letter generated. Do not send.        [facility] Health Information

## 2021-07-03 LAB — LAMOTRIGINE SERPL-MCNC: 1.9 UG/ML (ref 2.5–15)

## 2021-07-08 ENCOUNTER — TELEPHONE (OUTPATIENT)
Dept: NEUROLOGY | Facility: CLINIC | Age: 46
End: 2021-07-08

## 2021-07-08 RX ORDER — LAMOTRIGINE 25 MG/1
TABLET ORAL
Qty: 630 TABLET | Refills: 1 | Status: SHIPPED | OUTPATIENT
Start: 2021-07-08 | End: 2022-01-07

## 2021-07-08 NOTE — CONFIDENTIAL NOTE
I talked to the patient regarding his lamotrigine level, which was subtherapeutic. I instructed him to increase his lamotrigine to 100 mg in the morning and 75 mg in the evening. New prescription was sent.     Lorenza Hinson MD

## 2021-07-13 ENCOUNTER — OFFICE VISIT (OUTPATIENT)
Dept: PODIATRY | Facility: CLINIC | Age: 46
End: 2021-07-13
Payer: COMMERCIAL

## 2021-07-13 VITALS
BODY MASS INDEX: 23.11 KG/M2 | SYSTOLIC BLOOD PRESSURE: 130 MMHG | DIASTOLIC BLOOD PRESSURE: 70 MMHG | HEIGHT: 70 IN | WEIGHT: 161.4 LBS

## 2021-07-13 DIAGNOSIS — M24.571 EQUINUS CONTRACTURE OF RIGHT ANKLE: ICD-10-CM

## 2021-07-13 DIAGNOSIS — Z87.81 HISTORY OF FRACTURE OF TIBIA: ICD-10-CM

## 2021-07-13 DIAGNOSIS — S92.254A CLOSED NONDISPLACED FRACTURE OF NAVICULAR BONE OF RIGHT FOOT, INITIAL ENCOUNTER: Primary | ICD-10-CM

## 2021-07-13 DIAGNOSIS — S92.515A CLOSED NONDISPLACED FRACTURE OF PROXIMAL PHALANX OF LESSER TOE OF LEFT FOOT, INITIAL ENCOUNTER: ICD-10-CM

## 2021-07-13 PROCEDURE — 99207 PR FRACTURE CARE IN GLOBAL PERIOD: CPT | Performed by: PODIATRIST

## 2021-07-13 ASSESSMENT — MIFFLIN-ST. JEOR: SCORE: 1618.36

## 2021-07-13 ASSESSMENT — PAIN SCALES - GENERAL: PAINLEVEL: NO PAIN (0)

## 2021-07-13 NOTE — LETTER
[address] [address]  [phone]    2021      RE:  Claude Higgins  : 1975      To whom it may concern:    This patient may not return to work yet.  He is just now returning to some activity but cannot be on a job site just yet.  Will follow up in 2 weeks.      Sincerely,          Larry Bonds DPM
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
## 2021-07-27 ENCOUNTER — OFFICE VISIT (OUTPATIENT)
Dept: PODIATRY | Facility: CLINIC | Age: 46
End: 2021-07-27
Payer: COMMERCIAL

## 2021-07-27 ENCOUNTER — ANCILLARY PROCEDURE (OUTPATIENT)
Dept: GENERAL RADIOLOGY | Facility: CLINIC | Age: 46
End: 2021-07-27
Attending: PODIATRIST
Payer: COMMERCIAL

## 2021-07-27 VITALS
DIASTOLIC BLOOD PRESSURE: 82 MMHG | WEIGHT: 160 LBS | BODY MASS INDEX: 24.25 KG/M2 | HEIGHT: 68 IN | SYSTOLIC BLOOD PRESSURE: 130 MMHG

## 2021-07-27 DIAGNOSIS — S92.254A CLOSED NONDISPLACED FRACTURE OF NAVICULAR BONE OF RIGHT FOOT, INITIAL ENCOUNTER: ICD-10-CM

## 2021-07-27 DIAGNOSIS — S92.254A CLOSED NONDISPLACED FRACTURE OF NAVICULAR BONE OF RIGHT FOOT, INITIAL ENCOUNTER: Primary | ICD-10-CM

## 2021-07-27 PROCEDURE — 99207 PR FRACTURE CARE IN GLOBAL PERIOD: CPT | Performed by: PODIATRIST

## 2021-07-27 PROCEDURE — 73630 X-RAY EXAM OF FOOT: CPT | Mod: TC | Performed by: RADIOLOGY

## 2021-07-27 ASSESSMENT — MIFFLIN-ST. JEOR: SCORE: 1585.13

## 2021-07-27 ASSESSMENT — PAIN SCALES - GENERAL: PAINLEVEL: MODERATE PAIN (5)

## 2021-07-27 NOTE — LETTER
[address] [address]  [phone]    2021      RE:  Claude Higgins  : 1975      To whom it may concern:    This patient may return to work unrestricted on 21.       Sincerely,          Larry Bonds DPM

## 2021-07-27 NOTE — PROGRESS NOTES
"Chief Complaint   Patient presents with     RECHECK     (9w2d) 7/17/21 slipped in a hole, twisting Right foot, pain 5/10, pain with steps, walking w/limp today - Right navicular fx, DOI 5/23/2021; XR R foot 7/27/2021; LOV 7/13/2021     [facility] nursing notes from 7/13/2021 OV - (7w2d) NWB w/tall gray fx boot - Right navicular fx, DOI 5/23/2021; XR R jose t6/29/2021; LOV 6/29/2021    HPI:  Claude Higgins is a 46 year old male who is seen in consultation at the request of ED DEPT - Manuel Elliott MD.    Pt presents for eval of:   (Onset, Location, L/R, Character, Treatments, Injury if yes)    XR Right foot and ankle 6/3/2021     Onset 5/23/2021, rolled Right foot under while in go cart. 5/29/2021, fell off a tractor tire and rolled ankle after stepping into a hole.   7/28/2003 ORIF Right tib/fib surgery and 2008 removed hardware.    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     Previous right ankle open tib fib from a 6 foot fall 2003.  And now this navicular injury.     His last visit he has returned to walking in regular shoe gear without much complaint but fell in a hole and he feels he sprained his right ankle again but overall this has improved.       EXAM:Vitals: /82 (BP Location: Left arm, Patient Position: Sitting, Cuff Size: Adult Regular)   Ht 1.735 m (5' 8.31\")   Wt 72.6 kg (160 lb)   BMI 24.11 kg/m    BMI= Body mass index is 24.11 kg/m .    General appearance: Patient is alert and fully cooperative with history & exam.  No sign of distress is noted during the visit.     Psychiatric: Affect is pleasant & appropriate.  Patient appears motivated to improve health.     Respiratory: Breathing is regular & unlabored while sitting.     HEENT: Hearing is intact to spoken word.  Speech is clear.  No gross evidence of visual impairment that would impact ambulation.     Vascular: DP & PT pulses are intact & regular bilaterally.  No significant edema or " varicosities noted.  CFT and skin temperature is normal to both lower extremities.     Neurologic: Lower extremity sensation is intact to light touch.  No evidence of weakness or contracture in the lower extremities.  No evidence of neuropathy.    Dermatologic: Skin is intact to both lower extremities with adequate texture, turgor and tone about the integument.  No paronychia or evidence of soft tissue infection is noted.     Musculoskeletal: Patient is ambulatory without complaints.  No pain with direct palpation of the right navicular prominence or throughout range of motion of the ankle subtalar midtarsal metatarsal phalangeal joint.  Generalized right ankle equinus is noted from previous injury to the right ankle and tibia.    Radiographs: Dorsal avulsion fracture of the dorsal navicular with minimal displacement about 2 mm or less.  This is an intra-articular fracture     Radiographs: 6/29/2021 compared with previous imaging demonstrate no change in alignment with appropriate interval healing.  Radiographs of the left foot demonstrate fracture of the proximal phalanx of the left fourth toe.  It is oblique in nature minimal displacement.    Radiographs right foot 7/27/2021 demonstrate appropriate interval healing about the dorsal avulsion navicular fracture.  No further displacement is noted and no new injuries noted.     ASSESSMENT:       ICD-10-CM    1. Closed nondisplaced fracture of navicular bone of right foot, initial encounter  S92.254A XR Foot Right G/E 3 Views        PLAN:  Reviewed patient's chart in [facility].      6/8/2021   Interpreted radiographs  Discussed best outcome would be provide fixation to this fracture as it is intra-articular and a good portion or percentage of the talar head and navicular joint.  However this patient refuses any surgical intervention as he is had multiple procedures about his right leg with a muscle flap and states he reacted to hardware and then developed osteomyelitis  requiring IV antibiotics multiple times over a 2-year.  We discussed the fact that his ankle likely has less movement and motion longevity because of the previous tib-fib open fracture ORIF osteomyelitis and he admits that he does not want to move forward with any reduction or fixation.  Therefore he was placed in a 4 roll fiberglass below the knee cast with the ankle at 90.  He was instructed to remain nonweightbearing.  Patient refuses any further narcotics today as he has a history of addiction.  Patient can continue light duty mostly seated work as tolerated on crutches.  Follow-up again in 3 weeks and we will replace the cast remove it and reapply it then at 5-6 weeks of cast we will remove the cast and image it.  All questions were answered.    6/29/2021  Is 5 weeks 2 days out.   Patient can begin weightbearing to tolerance in the fracture boot on the right foot.  Stay in the fracture boot until 7 weeks post injury then likely progress out.  He would rather not pursue physical therapy therefore it was not ordered  Follow-up in 2 weeks to confirm he is progressing and weightbearing    Regarding the left foot radiographs were taken and fracture was identified  Recommended kat splinting and applied 1 inch Coban and demonstrated how to do this  Dispensed a postop shoe ambulation to as tolerated    7/13/2021  Patient was instructed to discontinue the fracture boot and return to light duty activities and regular shoe gear.  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  Follow-up again in 2 weeks, to evaluate return to work.  If he calls in the next couple of weeks requesting a letter to return to work unrestricted this can be granted.    7/27/2021  Patient feels as though he can return to work next week  Today I obtained and interpreted radiographs as he is a new injury but no new  fractures are noted.  Original fracture is consolidating well.  Recommend return to all activities as tolerated 8/2/2021      Larry Bonds DPM

## 2021-07-27 NOTE — LETTER
"    7/27/2021         RE: Claude Higgins  [address]  [facility] [address]        Dear Colleague,    Thank you for referring your patient, Claude Higgins, to the [facility]. Please see a copy of my visit note below.    Chief Complaint   Patient presents with     RECHECK     (9w2d) 7/17/21 slipped in a hole, twisting Right foot, pain 5/10, pain with steps, walking w/limp today - Right navicular fx, DOI 5/23/2021; XR R foot 7/27/2021; LOV 7/13/2021     [facility] nursing notes from 7/13/2021 OV - (7w2d) NWB w/tall gray fx boot - Right navicular fx, DOI 5/23/2021; XR R jose t6/29/2021; LOV 6/29/2021    HPI:  Claude Higgins is a 46 year old male who is seen in consultation at the request of ED DEPT - Manuel Elliott MD.    Pt presents for eval of:   (Onset, Location, L/R, Character, Treatments, Injury if yes)    XR Right foot and ankle 6/3/2021     Onset 5/23/2021, rolled Right foot under while in go cart. 5/29/2021, fell off a tractor tire and rolled ankle after stepping into a hole.   7/28/2003 ORIF Right tib/fib surgery and 2008 removed hardware.    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     Previous right ankle open tib fib from a 6 foot fall 2003.  And now this navicular injury.     His last visit he has returned to walking in regular shoe gear without much complaint but fell in a hole and he feels he sprained his right ankle again but overall this has improved.       EXAM:Vitals: /82 (BP Location: Left arm, Patient Position: Sitting, Cuff Size: Adult Regular)   Ht 1.735 m (5' 8.31\")   Wt 72.6 kg (160 lb)   BMI 24.11 kg/m    BMI= Body mass index is 24.11 kg/m .    General appearance: Patient is alert and fully cooperative with history & exam.  No sign of distress is noted during the visit.     Psychiatric: Affect is pleasant & appropriate.  Patient appears motivated to improve health.     Respiratory: Breathing is regular & unlabored " while sitting.     HEENT: Hearing is intact to spoken word.  Speech is clear.  No gross evidence of visual impairment that would impact ambulation.     Vascular: DP & PT pulses are intact & regular bilaterally.  No significant edema or varicosities noted.  CFT and skin temperature is normal to both lower extremities.     Neurologic: Lower extremity sensation is intact to light touch.  No evidence of weakness or contracture in the lower extremities.  No evidence of neuropathy.    Dermatologic: Skin is intact to both lower extremities with adequate texture, turgor and tone about the integument.  No paronychia or evidence of soft tissue infection is noted.     Musculoskeletal: Patient is ambulatory without complaints.  No pain with direct palpation of the right navicular prominence or throughout range of motion of the ankle subtalar midtarsal metatarsal phalangeal joint.  Generalized right ankle equinus is noted from previous injury to the right ankle and tibia.    Radiographs: Dorsal avulsion fracture of the dorsal navicular with minimal displacement about 2 mm or less.  This is an intra-articular fracture     Radiographs: 6/29/2021 compared with previous imaging demonstrate no change in alignment with appropriate interval healing.  Radiographs of the left foot demonstrate fracture of the proximal phalanx of the left fourth toe.  It is oblique in nature minimal displacement.    Radiographs right foot 7/27/2021 demonstrate appropriate interval healing about the dorsal avulsion navicular fracture.  No further displacement is noted and no new injuries noted.     ASSESSMENT:       ICD-10-CM    1. Closed nondisplaced fracture of navicular bone of right foot, initial encounter  S92.254A XR Foot Right G/E 3 Views        PLAN:  Reviewed patient's chart in [facility].      6/8/2021   Interpreted radiographs  Discussed best outcome would be provide fixation to this fracture as it is intra-articular and a good portion or percentage of  the talar head and navicular joint.  However this patient refuses any surgical intervention as he is had multiple procedures about his right leg with a muscle flap and states he reacted to hardware and then developed osteomyelitis requiring IV antibiotics multiple times over a 2-year.  We discussed the fact that his ankle likely has less movement and motion longevity because of the previous tib-fib open fracture ORIF osteomyelitis and he admits that he does not want to move forward with any reduction or fixation.  Therefore he was placed in a 4 roll fiberglass below the knee cast with the ankle at 90.  He was instructed to remain nonweightbearing.  Patient refuses any further narcotics today as he has a history of addiction.  Patient can continue light duty mostly seated work as tolerated on crutches.  Follow-up again in 3 weeks and we will replace the cast remove it and reapply it then at 5-6 weeks of cast we will remove the cast and image it.  All questions were answered.    6/29/2021  Is 5 weeks 2 days out.   Patient can begin weightbearing to tolerance in the fracture boot on the right foot.  Stay in the fracture boot until 7 weeks post injury then likely progress out.  He would rather not pursue physical therapy therefore it was not ordered  Follow-up in 2 weeks to confirm he is progressing and weightbearing    Regarding the left foot radiographs were taken and fracture was identified  Recommended kat splinting and applied 1 inch Coban and demonstrated how to do this  Dispensed a postop shoe ambulation to as tolerated    7/13/2021  Patient was instructed to discontinue the fracture boot and return to light duty activities and regular shoe gear.  He normally works spraying [employer] is not able to climb ladders or scaffolding or work above height so therefore he cannot return to his job of normal duty at this time.  Follow-up again in 2 weeks, to evaluate return to work.  If he calls in the next couple of  weeks requesting a letter to return to work unrestricted this can be granted.    7/27/2021  Patient feels as though he can return to work next week  Today I obtained and interpreted radiographs as he is a new injury but no new fractures are noted.  Original fracture is consolidating well.  Recommend return to all activities as tolerated 8/2/2021      Larry Bonds DPM                Again, thank you for allowing me to participate in the care of your patient.        Sincerely,        Larry Bonds DPM

## 2022-01-07 ENCOUNTER — OFFICE VISIT (OUTPATIENT)
Dept: NEUROLOGY | Facility: CLINIC | Age: 47
End: 2022-01-07
Payer: COMMERCIAL

## 2022-01-07 VITALS
BODY MASS INDEX: 25.75 KG/M2 | HEART RATE: 87 BPM | DIASTOLIC BLOOD PRESSURE: 84 MMHG | SYSTOLIC BLOOD PRESSURE: 149 MMHG | WEIGHT: 170.9 LBS

## 2022-01-07 DIAGNOSIS — R56.9 CONVULSIONS, UNSPECIFIED CONVULSION TYPE (H): Primary | ICD-10-CM

## 2022-01-07 DIAGNOSIS — G40.909 SEIZURE DISORDER (H): ICD-10-CM

## 2022-01-07 PROCEDURE — 99213 OFFICE O/P EST LOW 20 MIN: CPT | Performed by: PSYCHIATRY & NEUROLOGY

## 2022-01-07 PROCEDURE — 99000 SPECIMEN HANDLING OFFICE-LAB: CPT | Performed by: PSYCHIATRY & NEUROLOGY

## 2022-01-07 PROCEDURE — 36415 COLL VENOUS BLD VENIPUNCTURE: CPT | Performed by: PSYCHIATRY & NEUROLOGY

## 2022-01-07 PROCEDURE — 80175 DRUG SCREEN QUAN LAMOTRIGINE: CPT | Mod: 90 | Performed by: PSYCHIATRY & NEUROLOGY

## 2022-01-07 RX ORDER — LAMOTRIGINE 25 MG/1
TABLET ORAL
Qty: 630 TABLET | Refills: 1 | Status: SHIPPED | OUTPATIENT
Start: 2022-01-07 | End: 2022-07-01

## 2022-01-07 NOTE — LETTER
Date:January 10, 2022      Patient was self referred, no letter generated. Do not send.        [facility] Health Information

## 2022-01-07 NOTE — NURSING NOTE
Claude Higgins's goals for this visit include:   Chief Complaint   Patient presents with     Seizures     He requests these members of his care team be copied on today's visit information: No    PCP: No Ref-Primary, Physician    Referring Provider:  No referring provider defined for this encounter.    BP (!) 149/84 (BP Location: Right arm, Patient Position: Sitting, Cuff Size: Adult Regular)   Pulse 87   Wt 77.5 kg (170 lb 14.4 oz)   BMI 25.75 kg/m      Do you need any medication refills at today's visit? No

## 2022-01-07 NOTE — PROGRESS NOTES
" EPILEPSY PROGRESS NOTE   [facility]    Patient:Claude Higgins  : 1975  Age: 46 year old  Today's Office Visit: 2022    Epilepsy History:   The patient's seizures started 2016.  He had 3 seizures in his life, the last one was 10/2016.  All his seizures were alcohol related.  He states he did binge drink the night before all his seizures.  He describes his seizure as no auras, and \"I just lose consciousness.\"  Witnesses have reported shaking all over, making noises, and stiffening up.  He had urinary incontinence with his last seizure, denies tongue biting, major injuries or car accidents.  He tried levetiracetam, but it caused slow thinking and tiredness. Then he was started on lamotrigine, which he tolerates well.   Has a h/o amphetamine use, sober since  and heavy alcohol drinking.    History of present illness:     Mr. Arce returns to the clinic for a follow up on his seizures.  He did not have any seizures since last visit in 2021. Previous seizures were related to heavy alcohol drinking. He says he used to mix his drink with energy drink, but he doesn't do it any more.  He drinks couple drinks of Vodka and couple cans of beers.      Anxiety: his anxiety has improved. He doesn't take any medication.     Current Outpatient Medications   Medication Sig Dispense Refill     ibuprofen (ADVIL) 200 MG capsule Take 200 mg by mouth 3 times daily as needed       lamoTRIgine (LAMICTAL) 25 MG tablet Take 4 tabs in the morning and 3 tabs in the evening 630 tablet 1     Medication compliance: compliant     Exam:    BP (!) 149/84 (BP Location: Right arm, Patient Position: Sitting, Cuff Size: Adult Regular)   Pulse 87   Wt 77.5 kg (170 lb 14.4 oz)   BMI 25.75 kg/m      Wt Readings from Last 5 Encounters:   21 72.6 kg (160 lb)   21 73.2 kg (161 lb 6.4 oz)   21 73.2 kg (161 lb 6.4 oz)   21 74.8 kg (165 lb)   21 74.8 kg (165 lb)     General Appearance: Alert, " awake, NAD  Gait: steady, tandem gait: has moderate imbalance  Attention Span:  Normal  Language/speech: no aphasia or dysarthria  Extraocular Movements:  Normal  Coordination:  Normal finger to nose  Motor Exam: normal tone, bulk and strength 5/5 bilaterally    Assessment/Plan:     1. Convulsions: Normal EEG and brain MRI. All his seizures were all alcohol related.  He doesn't binge drink anymore and did cut back on his drinking. Lamotrigine level was low on 75 mg bid, so it was increased to  July last year. He wants to continue taking lamotrigine and doesn't feel safe coming off of it.       2. Anxiety: his anxiety is better. He is not taking any medication.      - Continue lamotrigine 100-75 mg per day  - Obtain lamotrigine level for efficacy and compliance   - RTC in 6 months      As described above, I met with the patient for 15 minutes and during this time counseling was greater than 50% of the visit time.  Lorenza Hinson MD

## 2022-01-07 NOTE — LETTER
"    2022         RE: Claude Higgins  [address]  [facility] [address]        Dear Colleague,    Thank you for referring your patient, Claude Higgins, to the [facility] [place]. Please see a copy of my visit note below.     EPILEPSY PROGRESS NOTE   [facility]    Patient:Claude Higgins  : 1975  Age: 46 year old  Today's Office Visit: 2022    Epilepsy History:   The patient's seizures started 2016.  He had 3 seizures in his life, the last one was 10/2016.  All his seizures were alcohol related.  He states he did binge drink the night before all his seizures.  He describes his seizure as no auras, and \"I just lose consciousness.\"  Witnesses have reported shaking all over, making noises, and stiffening up.  He had urinary incontinence with his last seizure, denies tongue biting, major injuries or car accidents.  He tried levetiracetam, but it caused slow thinking and tiredness. Then he was started on lamotrigine, which he tolerates well.   Has a h/o amphetamine use, sober since  and heavy alcohol drinking.    History of present illness:     Mr. Arce returns to the clinic for a follow up on his seizures.  He did not have any seizures since last visit in 2021. Previous seizures were related to heavy alcohol drinking. He says he used to mix his drink with energy drink, but he doesn't do it any more.  He drinks couple drinks of Vodka and couple cans of beers.      Anxiety: his anxiety has improved. He doesn't take any medication.     Current Outpatient Medications   Medication Sig Dispense Refill     ibuprofen (ADVIL) 200 MG capsule Take 200 mg by mouth 3 times daily as needed       lamoTRIgine (LAMICTAL) 25 MG tablet Take 4 tabs in the morning and 3 tabs in the evening 630 tablet 1     Medication compliance: compliant     Exam:    BP (!) 149/84 (BP Location: Right arm, Patient Position: Sitting, Cuff Size: Adult Regular)   Pulse 87   Wt 77.5 kg (170 " lb 14.4 oz)   BMI 25.75 kg/m      Wt Readings from Last 5 Encounters:   07/27/21 72.6 kg (160 lb)   07/13/21 73.2 kg (161 lb 6.4 oz)   07/02/21 73.2 kg (161 lb 6.4 oz)   06/29/21 74.8 kg (165 lb)   06/08/21 74.8 kg (165 lb)     General Appearance: Alert, awake, NAD  Gait: steady, tandem gait: has moderate imbalance  Attention Span:  Normal  Language/speech: no aphasia or dysarthria  Extraocular Movements:  Normal  Coordination:  Normal finger to nose  Motor Exam: normal tone, bulk and strength 5/5 bilaterally    Assessment/Plan:     1. Convulsions: Normal EEG and brain MRI. All his seizures were all alcohol related.  He doesn't binge drink anymore and did cut back on his drinking. Lamotrigine level was low on 75 mg bid, so it was increased to  July last year. He wants to continue taking lamotrigine and doesn't feel safe coming off of it.       2. Anxiety: his anxiety is better. He is not taking any medication.      - Continue lamotrigine 100-75 mg per day  - Obtain lamotrigine level for efficacy and compliance   - RTC in 6 months      As described above, I met with the patient for 15 minutes and during this time counseling was greater than 50% of the visit time.  Lorenza Hinson MD        Again, thank you for allowing me to participate in the care of your patient.        Sincerely,        Lorenza Hinson MD

## 2022-01-08 LAB — LAMOTRIGINE SERPL-MCNC: 3.4 UG/ML

## 2022-03-20 ENCOUNTER — HOSPITAL ENCOUNTER (EMERGENCY)
Facility: CLINIC | Age: 47
Discharge: HOME | End: 2022-03-20
Attending: EMERGENCY MEDICINE | Admitting: EMERGENCY MEDICINE
Payer: COMMERCIAL

## 2022-03-20 VITALS
HEART RATE: 78 BPM | SYSTOLIC BLOOD PRESSURE: 140 MMHG | OXYGEN SATURATION: 96 % | DIASTOLIC BLOOD PRESSURE: 89 MMHG | TEMPERATURE: 98 F | RESPIRATION RATE: 18 BRPM

## 2022-03-20 DIAGNOSIS — K11.20 PAROTIDITIS: ICD-10-CM

## 2022-03-20 PROCEDURE — 99281 EMR DPT VST MAYX REQ PHY/QHP: CPT | Performed by: EMERGENCY MEDICINE

## 2022-03-21 ENCOUNTER — HOSPITAL ENCOUNTER (EMERGENCY)
Facility: CLINIC | Age: 47
Discharge: HOME | End: 2022-03-21
Attending: EMERGENCY MEDICINE | Admitting: EMERGENCY MEDICINE
Payer: COMMERCIAL

## 2022-03-21 VITALS
TEMPERATURE: 98.4 F | RESPIRATION RATE: 18 BRPM | DIASTOLIC BLOOD PRESSURE: 96 MMHG | SYSTOLIC BLOOD PRESSURE: 150 MMHG | HEART RATE: 78 BPM | OXYGEN SATURATION: 99 %

## 2022-03-21 DIAGNOSIS — K11.20 PAROTITIS: ICD-10-CM

## 2022-03-21 PROCEDURE — 99283 EMERGENCY DEPT VISIT LOW MDM: CPT | Mod: 25 | Performed by: EMERGENCY MEDICINE

## 2022-03-21 PROCEDURE — 42300 DRAINAGE OF SALIVARY GLAND: CPT

## 2022-03-21 PROCEDURE — 42300 DRAINAGE OF SALIVARY GLAND: CPT | Performed by: EMERGENCY MEDICINE

## 2022-03-21 PROCEDURE — 99284 EMERGENCY DEPT VISIT MOD MDM: CPT | Mod: 25 | Performed by: EMERGENCY MEDICINE

## 2022-03-21 RX ORDER — LAMOTRIGINE 25 MG/1
75 TABLET ORAL AT BEDTIME
COMMUNITY
End: 2022-07-01

## 2022-03-21 NOTE — DISCHARGE INSTRUCTIONS
Take antibiotics as directed.  Use warm compresses and massage parotid.  Return to er if increased pain swelling fever etc.  It was a pleasure to meet you.  You can continue to use lemon drops.

## 2022-03-21 NOTE — ED PROVIDER NOTES
"  History     Chief Complaint   Patient presents with     Facial Swelling     HPI  Claude Higgins is a 47 year old male who presents with acute swelling in front of the right ear.  This began 3 hours ago.  Minimal pain.  No fever.  No trauma to the area.    Allergies:  Allergies   Allergen Reactions     Penicillin G Other (See Comments)     \"i am given a generic PCN every 5 yr or so when I have strep throat and I do fine with that. I don't think I am allergic to PCN.       Problem List:    Patient Active Problem List    Diagnosis Date Noted     hx open tib-fib fracture 2003 06/08/2021     Priority: Medium     History of osteomyelitis 2003 06/08/2021     Priority: Medium     Closed nondisplaced fracture of navicular bone of right foot, initial encounter 06/08/2021     Priority: Medium     Equinus contracture of right ankle 06/08/2021     Priority: Medium     Shoulder strain, right, subsequent encounter 08/09/2017     Priority: Medium     Cannabis abuse 10/07/2016     Priority: Medium     Seizure disorder (H) 10/07/2016     Priority: Medium     Tobacco use disorder 07/02/2015     Priority: Medium     Unilateral inguinal hernia without obstruction or gangrene, recurrence not specified 07/02/2015     Priority: Medium     Alcohol use      Priority: Medium     GERD (gastroesophageal reflux disease)      Priority: Medium     Hyperlipidemia LDL goal <160 06/29/2015     Priority: Medium        Past Medical History:    Past Medical History:   Diagnosis Date     Alcohol use      GERD (gastroesophageal reflux disease)      Inguinal hernia 2015     Tobacco abuse        Past Surgical History:    Past Surgical History:   Procedure Laterality Date     HERNIA REPAIR, INGUINAL RT/LT Right 1976     HERNIORRHAPHY INGUINAL Left 7/10/2015    Procedure: HERNIORRHAPHY INGUINAL;  Surgeon: Vicente Aleman MD;  Location: [facility]     SURGICAL HISTORY OF -  Right 7/28/2003-04    Tib/fib, ORIF, bone and skin grafting and arterial bypass     " SURGICAL HISTORY OF -  Right 2008    Tib/fib, hardware removal.      TESTICLE SURGERY Right 1993    Torsion correction, and prev. undescended       Family History:    Family History   Problem Relation Age of Onset     Myocardial Infarction Father      Myocardial Infarction Maternal Grandfather      Myocardial Infarction Paternal Grandfather        Social History:  Marital Status:  Single [1]  Social History     Tobacco Use     Smoking status: Current Every Day Smoker     Packs/day: 1.00     Years: 17.00     Pack years: 17.00     Types: Cigarettes     Smokeless tobacco: Current User     Types: Chew   Substance Use Topics     Alcohol use: Yes     Alcohol/week: 0.0 standard drinks     Comment: daily     Drug use: Yes     Types: Marijuana     Comment: once a month        Medications:    ibuprofen (ADVIL) 200 MG capsule  lamoTRIgine (LAMICTAL) 25 MG tablet          Review of Systems  All other systems are reviewed and are negative    Physical Exam   BP: (!) 166/101 (right arm, regular cuff)  Pulse: 83  Temp: 98.4  F (36.9  C)  Resp: 16  SpO2: 96 %      Physical Exam  Vitals and nursing note reviewed.   Constitutional:       General: He is not in acute distress.     Appearance: He is well-developed. He is not diaphoretic.   HENT:      Head: Normocephalic and atraumatic.      Comments: Soft tissue fullness just anterior and inferior to the right ear consistent with parotid gland.  Minimal tenderness.  No erythema or warmth.  No trismus.  No intraoral lesions  Eyes:      General: No scleral icterus.        Right eye: No discharge.         Left eye: No discharge.      Conjunctiva/sclera: Conjunctivae normal.   Cardiovascular:      Rate and Rhythm: Normal rate and regular rhythm.      Heart sounds: Normal heart sounds. No murmur heard.  Pulmonary:      Effort: Pulmonary effort is normal. No respiratory distress.      Breath sounds: Normal breath sounds. No stridor.   Abdominal:      Palpations: Abdomen is soft.       Tenderness: There is no abdominal tenderness.   Musculoskeletal:         General: Normal range of motion.      Cervical back: Normal range of motion and neck supple.   Skin:     General: Skin is warm and dry.      Coloration: Skin is not pale.      Findings: No erythema or rash.   Neurological:      Mental Status: He is alert.      Cranial Nerves: No cranial nerve deficit.      Motor: No abnormal muscle tone.         ED Course                 Procedures              Critical Care time:  none               No results found for this or any previous visit (from the past 24 hour(s)).    Medications - No data to display    Assessments & Plan (with Medical Decision Making)  47-year-old male with acute onset of swelling in the parotid region.  No fever or other infectious symptoms.  Not especially tender.  Suspect salivary stone at this point.  Differential is broad.  I recommended treating conservatively for now.  However will need follow-up with ENT if symptoms persist over the next week.     I have reviewed the nursing notes.    I have reviewed the findings, diagnosis, plan and need for follow up with the patient.       New Prescriptions    No medications on file       Final diagnoses:   Parotiditis       3/20/2022   [facility] EMERGENCY DEPT     Abilio Nick MD  03/20/22 3997

## 2022-03-22 NOTE — ED PROVIDER NOTES
History     Chief Complaint   Patient presents with     Facial Swelling     HPI  Claude Higgins is a 47 year old male who presents to the emergency department secondary to increased swelling of his right parotid.  He was seen here last night for swelling of his parotid and was given return precautions and told to use sour candies.  He was not that tender or swollen at the time.  Differential diagnosis at the time included salivary duct stones with obstruction.  It was recommended that he have ENT follow-up should this worsen.  Since last night the swelling has increased and has become more tender just anterior to the right here.  He has pain with opening closing his jaw but no trismus or drooling or difficulty swallowing.  He has not had any fever or pustular discharge in the mouth.  It does not feel particularly dry in the mouth.    Allergies:  No Known Allergies    Problem List:    Patient Active Problem List    Diagnosis Date Noted     hx open tib-fib fracture 2003 06/08/2021     Priority: Medium     History of osteomyelitis 2003 06/08/2021     Priority: Medium     Closed nondisplaced fracture of navicular bone of right foot, initial encounter 06/08/2021     Priority: Medium     Equinus contracture of right ankle 06/08/2021     Priority: Medium     Shoulder strain, right, subsequent encounter 08/09/2017     Priority: Medium     Cannabis abuse 10/07/2016     Priority: Medium     Seizure disorder (H) 10/07/2016     Priority: Medium     Tobacco use disorder 07/02/2015     Priority: Medium     Unilateral inguinal hernia without obstruction or gangrene, recurrence not specified 07/02/2015     Priority: Medium     Alcohol use      Priority: Medium     GERD (gastroesophageal reflux disease)      Priority: Medium     Hyperlipidemia LDL goal <160 06/29/2015     Priority: Medium        Past Medical History:    Past Medical History:   Diagnosis Date     Alcohol use      GERD (gastroesophageal reflux disease)       Inguinal hernia 2015     Tobacco abuse        Past Surgical History:    Past Surgical History:   Procedure Laterality Date     HERNIA REPAIR, INGUINAL RT/LT Right 1976     HERNIORRHAPHY INGUINAL Left 7/10/2015    Procedure: HERNIORRHAPHY INGUINAL;  Surgeon: Vicente Aleman MD;  Location: [facility]     SURGICAL HISTORY OF -  Right 7/28/2003-04    Tib/fib, ORIF, bone and skin grafting and arterial bypass     SURGICAL HISTORY OF -  Right 2008    Tib/fib, hardware removal.      TESTICLE SURGERY Right 1993    Torsion correction, and prev. undescended       Family History:    Family History   Problem Relation Age of Onset     Myocardial Infarction Father      Myocardial Infarction Maternal Grandfather      Myocardial Infarction Paternal Grandfather        Social History:  Marital Status:  Single [1]  Social History     Tobacco Use     Smoking status: Current Every Day Smoker     Packs/day: 1.00     Years: 17.00     Pack years: 17.00     Types: Cigarettes     Smokeless tobacco: Current User     Types: Chew   Substance Use Topics     Alcohol use: Yes     Alcohol/week: 0.0 standard drinks     Comment: daily     Drug use: Yes     Types: Marijuana     Comment: once a month        Medications:    amoxicillin-clavulanate (AUGMENTIN) 875-125 MG tablet  ibuprofen (ADVIL) 200 MG capsule  lamoTRIgine (LAMICTAL) 25 MG tablet  lamoTRIgine (LAMICTAL) 25 MG tablet          Review of Systems   All other systems reviewed and are negative.      Physical Exam   BP: (!) 150/96  Pulse: 78  Temp: 98.4  F (36.9  C)  Resp: 18  SpO2: 99 %      Physical Exam  Vitals and nursing note reviewed.   Constitutional:       General: He is not in acute distress.     Appearance: He is well-developed. He is not diaphoretic.   HENT:      Head: Normocephalic and atraumatic.      Right Ear: External ear normal.      Left Ear: External ear normal.      Nose: Nose normal.      Mouth/Throat:      Mouth: Mucous membranes are moist.      Pharynx: Posterior  oropharyngeal erythema present. No oropharyngeal exudate.      Comments: The exit of Stensen's duct into the right buccal mucosa is somewhat inflamed and swollen with a white 2 mm head.  No palpable Stensen's duct stone.  Attempts to milk it were initially unsuccessful.  Dentures were removed for my exam.  Eyes:      General: No scleral icterus.     Extraocular Movements: Extraocular movements intact.      Conjunctiva/sclera: Conjunctivae normal.   Cardiovascular:      Rate and Rhythm: Normal rate.   Pulmonary:      Effort: Pulmonary effort is normal.      Breath sounds: Normal breath sounds.   Musculoskeletal:         General: Normal range of motion.      Cervical back: Normal range of motion and neck supple.   Skin:     General: Skin is warm and dry.      Findings: No rash.   Neurological:      Mental Status: He is alert and oriented to person, place, and time.         ED Course                 Procedures         I discussed the case with Dr. Sharpe who felt it was okay to attempt to probe the white head and of the Stensen's duct in order to try to open it up.  I used an eye daniel to open up the self.  I was careful to briefly touch it over the self only.  Once I opened it up a large amount of pus came out of the duct.  There was definitely pressure behind this self.  I did not feel any Stensen duct stones.  I gently probed the distal aspect of Stensen's duct with the eye daniel without it running.  There was no bleeding and the distal aspect of the duct was open.  The patient tolerated the procedure well.       No results found for this or any previous visit (from the past 24 hour(s)).    Medications - No data to display    Assessments & Plan (with Medical Decision Making)  Parotitis with obstructed Stensen's duct.  This was opened up as above.  No bleeding or concern for damage to the duct.  I gave the patient follow-up precautions with ENT should he have more problems with this.  He was started  on Augmentin and encouraged to use warm compresses and continue with lemon candies or sour candies.  The patient had relief of his symptoms.  He is in agreement with the plan.  Return to ER precautions and fall precautions discussed.     I have reviewed the nursing notes.    I have reviewed the findings, diagnosis, plan and need for follow up with the patient.      Discharge Medication List as of 3/21/2022  5:58 PM      START taking these medications    Details   amoxicillin-clavulanate (AUGMENTIN) 875-125 MG tablet Take 1 tablet by mouth 2 times daily, Disp-20 tablet, R-0, E-Prescribe             Final diagnoses:   Parotitis       3/21/2022   [facility] EMERGENCY DEPT     Buzz Bell MD  03/21/22 2041

## 2022-07-01 ENCOUNTER — OFFICE VISIT (OUTPATIENT)
Dept: NEUROLOGY | Facility: CLINIC | Age: 47
End: 2022-07-01
Payer: COMMERCIAL

## 2022-07-01 VITALS
DIASTOLIC BLOOD PRESSURE: 87 MMHG | BODY MASS INDEX: 22.85 KG/M2 | HEIGHT: 70 IN | WEIGHT: 159.6 LBS | HEART RATE: 75 BPM | SYSTOLIC BLOOD PRESSURE: 125 MMHG

## 2022-07-01 DIAGNOSIS — G40.909 SEIZURE DISORDER (H): ICD-10-CM

## 2022-07-01 PROCEDURE — 80175 DRUG SCREEN QUAN LAMOTRIGINE: CPT | Mod: 90 | Performed by: PSYCHIATRY & NEUROLOGY

## 2022-07-01 PROCEDURE — 99213 OFFICE O/P EST LOW 20 MIN: CPT | Performed by: PSYCHIATRY & NEUROLOGY

## 2022-07-01 PROCEDURE — 99000 SPECIMEN HANDLING OFFICE-LAB: CPT | Performed by: PSYCHIATRY & NEUROLOGY

## 2022-07-01 PROCEDURE — 36415 COLL VENOUS BLD VENIPUNCTURE: CPT | Performed by: PSYCHIATRY & NEUROLOGY

## 2022-07-01 RX ORDER — LAMOTRIGINE 25 MG/1
TABLET ORAL
Qty: 630 TABLET | Refills: 3 | Status: SHIPPED | OUTPATIENT
Start: 2022-07-01 | End: 2023-07-07

## 2022-07-01 ASSESSMENT — PAIN SCALES - GENERAL: PAINLEVEL: NO PAIN (0)

## 2022-07-01 NOTE — NURSING NOTE
"Claude Higgins's goals for this visit include: return  He requests these members of his care team be copied on today's visit information:     PCP: No Ref-Primary, Physician    Referring Provider:  No referring provider defined for this encounter.    /87   Pulse 75   Ht 1.778 m (5' 10\")   Wt 72.4 kg (159 lb 9.6 oz)   BMI 22.90 kg/m      Do you need any medication refills at today's visit? y  "

## 2022-07-01 NOTE — LETTER
Date:July 5, 2022      Patient was self referred, no letter generated. Do not send.        [facility] Health Information

## 2022-07-01 NOTE — LETTER
"    2022         RE: Claude Higgins  [id number] [address] [facility] [address]        Dear Colleague,    Thank you for referring your patient, Claude Higgins, to the [facility] [place]. Please see a copy of my visit note below.     NEUROLOGY PROGRESS NOTE   [facility]    Patient:Claude Higgins  : 1975  Age: 47 year old  Today's Office Visit: 2022    Epilepsy History:     The patient's seizures started 2016.  He had 3 seizures in his life, the last one was 10/2016.  All his seizures were alcohol related.  He states he did binge drink the night before all his seizures.  He describes his seizure as no auras, and \"I just lose consciousness.\"  Witnesses have reported shaking all over, making noises, and stiffening up.  He had urinary incontinence with his last seizure, denies tongue biting, major injuries or car accidents.  He tried levetiracetam, but it caused slow thinking and tiredness. Then he was started on lamotrigine, which he tolerates well.   Has a h/o amphetamine use, sober since  and heavy alcohol drinking.     History of present illness:     Claude returns to the clinic for a follow up on his seizures.  He was last seen 22. He has been seizure-free for 5 years. He is \"super happy\" about it.  He is taking lamotrigine 100-75. He denies dizziness, unsteadiness, blurred/double vision.      Anxiety: is better, he doesn't take any medication.      Current Outpatient Medications   Medication Sig Dispense Refill     ibuprofen (ADVIL/MOTRIN) 200 MG capsule Take 800 mg by mouth 3 times daily as needed for inflammatory pain        lamoTRIgine (LAMICTAL) 25 MG tablet Take 4 tabs in the morning and 3 tabs in the evening (Patient taking differently: Take 100 mg by mouth daily before breakfast And 75 mg in the evening) 630 tablet 1     Exam:    /87   Pulse 75   Ht 1.778 m (5' 10\")   Wt 72.4 kg (159 lb 9.6 oz)   BMI 22.90 kg/m       Wt " Readings from Last 5 Encounters:   07/01/22 72.4 kg (159 lb 9.6 oz)   01/07/22 77.5 kg (170 lb 14.4 oz)   07/27/21 72.6 kg (160 lb)   07/13/21 73.2 kg (161 lb 6.4 oz)   07/02/21 73.2 kg (161 lb 6.4 oz)     General Appearance: Alert, awake, cooperative, pleasant, NAD  Gait: steady, tandem gait: slightly unsteady, needs to   Attention Span:  Normal  Language/speech: no aphasia or dysarthria  Extraocular Movements:  Normal  Coordination:  Normal finger to nose  Facial Strength:  Normal  Tongue Strength:  Normal  Motor Exam: normal tone, bulk and strength 5/5 bilaterally    Assessment/Plan:    1. Convulsions: Normal EEG and brain MRI. All his seizures were all alcohol related.  He doesn't binge drink anymore and did cut back on his drinking. Lamotrigine was increased to  due to subtherapeutic level. He wants to continue taking lamotrigine and doesn't feel safe coming off of it.       2. Anxiety: his anxiety is better. He is not taking any medication.      - Continue lamotrigine 100-75 mg per day  - Obtain lamotrigine level for efficacy and compliance   - RTC in 1 year.         As described above, I met with the patient for 25 minutes and during this time counseling was greater than 50% of the visit time.  Lorenza Hinson MD        Again, thank you for allowing me to participate in the care of your patient.        Sincerely,        Lorenza Hinson MD

## 2022-07-01 NOTE — PROGRESS NOTES
" NEUROLOGY PROGRESS NOTE   [facility]    Patient:Claude Higgins  : 1975  Age: 47 year old  Today's Office Visit: 2022    Epilepsy History:     The patient's seizures started 2016.  He had 3 seizures in his life, the last one was 10/2016.  All his seizures were alcohol related.  He states he did binge drink the night before all his seizures.  He describes his seizure as no auras, and \"I just lose consciousness.\"  Witnesses have reported shaking all over, making noises, and stiffening up.  He had urinary incontinence with his last seizure, denies tongue biting, major injuries or car accidents.  He tried levetiracetam, but it caused slow thinking and tiredness. Then he was started on lamotrigine, which he tolerates well.   Has a h/o amphetamine use, sober since  and heavy alcohol drinking.     History of present illness:     Claude returns to the clinic for a follow up on his seizures.  He was last seen 22. He has been seizure-free for 5 years. He is \"super happy\" about it.  He is taking lamotrigine 100-75. He denies dizziness, unsteadiness, blurred/double vision.      Anxiety: is better, he doesn't take any medication.      Current Outpatient Medications   Medication Sig Dispense Refill     ibuprofen (ADVIL/MOTRIN) 200 MG capsule Take 800 mg by mouth 3 times daily as needed for inflammatory pain        lamoTRIgine (LAMICTAL) 25 MG tablet Take 4 tabs in the morning and 3 tabs in the evening (Patient taking differently: Take 100 mg by mouth daily before breakfast And 75 mg in the evening) 630 tablet 1     Exam:    /87   Pulse 75   Ht 1.778 m (5' 10\")   Wt 72.4 kg (159 lb 9.6 oz)   BMI 22.90 kg/m       Wt Readings from Last 5 Encounters:   22 72.4 kg (159 lb 9.6 oz)   22 77.5 kg (170 lb 14.4 oz)   21 72.6 kg (160 lb)   21 73.2 kg (161 lb 6.4 oz)   21 73.2 kg (161 lb 6.4 oz)     General Appearance: Alert, awake, cooperative, pleasant, NAD  Gait: " steady, tandem gait: slightly unsteady, needs to   Attention Span:  Normal  Language/speech: no aphasia or dysarthria  Extraocular Movements:  Normal  Coordination:  Normal finger to nose  Facial Strength:  Normal  Tongue Strength:  Normal  Motor Exam: normal tone, bulk and strength 5/5 bilaterally    Assessment/Plan:    1. Convulsions: Normal EEG and brain MRI. All his seizures were all alcohol related.  He doesn't binge drink anymore and did cut back on his drinking. Lamotrigine was increased to  due to subtherapeutic level. He wants to continue taking lamotrigine and doesn't feel safe coming off of it.       2. Anxiety: his anxiety is better. He is not taking any medication.      - Continue lamotrigine 100-75 mg per day  - Obtain lamotrigine level for efficacy and compliance   - RTC in 1 year.         As described above, I met with the patient for 25 minutes and during this time counseling was greater than 50% of the visit time.  Lorenza Hinson MD

## 2022-07-03 LAB — LAMOTRIGINE SERPL-MCNC: 3.2 UG/ML

## 2022-08-02 ENCOUNTER — APPOINTMENT (OUTPATIENT)
Dept: GENERAL RADIOLOGY | Facility: CLINIC | Age: 47
End: 2022-08-02
Attending: FAMILY MEDICINE
Payer: COMMERCIAL

## 2022-08-02 ENCOUNTER — HOSPITAL ENCOUNTER (EMERGENCY)
Facility: CLINIC | Age: 47
Discharge: HOME | End: 2022-08-02
Attending: FAMILY MEDICINE | Admitting: FAMILY MEDICINE
Payer: COMMERCIAL

## 2022-08-02 VITALS
WEIGHT: 159 LBS | DIASTOLIC BLOOD PRESSURE: 100 MMHG | RESPIRATION RATE: 18 BRPM | HEART RATE: 79 BPM | OXYGEN SATURATION: 98 % | BODY MASS INDEX: 22.81 KG/M2 | SYSTOLIC BLOOD PRESSURE: 159 MMHG | TEMPERATURE: 98.3 F

## 2022-08-02 DIAGNOSIS — S90.122A CONTUSION OF FIFTH TOE OF LEFT FOOT, INITIAL ENCOUNTER: ICD-10-CM

## 2022-08-02 PROCEDURE — 99284 EMERGENCY DEPT VISIT MOD MDM: CPT | Performed by: FAMILY MEDICINE

## 2022-08-02 PROCEDURE — 73630 X-RAY EXAM OF FOOT: CPT | Mod: LT

## 2022-08-02 PROCEDURE — 99283 EMERGENCY DEPT VISIT LOW MDM: CPT | Performed by: FAMILY MEDICINE

## 2022-08-02 RX ORDER — ACETAMINOPHEN 500 MG
500-1000 TABLET ORAL EVERY 6 HOURS PRN
Refills: 0 | COMMUNITY
Start: 2022-08-02 | End: 2022-08-06

## 2022-08-02 ASSESSMENT — ENCOUNTER SYMPTOMS
WEAKNESS: 0
WOUND: 0
ARTHRALGIAS: 1
NUMBNESS: 0

## 2022-08-02 NOTE — ED TRIAGE NOTES
Pt presents with left foot pain. Pt lost balance and fell off scaffolding landing on feet. Scaffolding approx 5 feet up. Pt was working. Pt has crutches and in ortho boot       Triage Assessment     Row Name 08/02/22 4503       Triage Assessment (Adult)    Airway WDL WDL       Respiratory WDL    Respiratory WDL WDL       Skin Circulation/Temperature WDL    Skin Circulation/Temperature WDL WDL       Cardiac WDL    Cardiac WDL WDL       Peripheral/Neurovascular WDL    Peripheral Neurovascular WDL WDL       Cognitive/Neuro/Behavioral WDL    Cognitive/Neuro/Behavioral WDL WDL

## 2022-08-02 NOTE — LETTER
1975      To Whom it may concern:    Claude Higgins was seen in our Emergency Department today, 08/02/22 for an injury that was reported to be work related.      The injury occurred on 8/2/2022  .  Diagnosis:   1. Contusion of fifth toe of left foot, initial encounter        Allow use of ACE wrap as needed for swelling /pain    Follow up: If not improved in 10 days for recheck.    Sincerely,    Artruo Madison  Physician  [facility] Emergency Room

## 2022-08-03 NOTE — ED PROVIDER NOTES
History     Chief Complaint   Patient presents with     Foot Pain     Left foot injury .      HPI  Claude Higgins is a 47 year old male who presents to the ER secondary to concerns of pain to the left lateral foot.  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  He has pain to the left lateral foot.  States that the fall occurred about 1:00 this afternoon.  Patient states that he has had 27 broken bones in his life.  He had a lower leg splint at home so he put that on with some ice to help prevent additional swelling.  Patient states that the pain was not too bad until he got into his truck and drove an hour and the pain was more severe.  Denied any other injury associated with a fall from scaffolding.    Allergies:  Allergies   Allergen Reactions     No Known Allergies        Problem List:    Patient Active Problem List    Diagnosis Date Noted     hx open tib-fib fracture 2003 06/08/2021     Priority: Medium     History of osteomyelitis 2003 06/08/2021     Priority: Medium     Closed nondisplaced fracture of navicular bone of right foot, initial encounter 06/08/2021     Priority: Medium     Equinus contracture of right ankle 06/08/2021     Priority: Medium     Shoulder strain, right, subsequent encounter 08/09/2017     Priority: Medium     Cannabis abuse 10/07/2016     Priority: Medium     Seizure disorder (H) 10/07/2016     Priority: Medium     Tobacco use disorder 07/02/2015     Priority: Medium     Unilateral inguinal hernia without obstruction or gangrene, recurrence not specified 07/02/2015     Priority: Medium     Alcohol use      Priority: Medium     GERD (gastroesophageal reflux disease)      Priority: Medium     Hyperlipidemia LDL goal <160 06/29/2015     Priority: Medium        Past Medical History:    Past Medical History:   Diagnosis Date     Alcohol use      GERD (gastroesophageal  reflux disease)      Inguinal hernia 2015     Tobacco abuse        Past Surgical History:    Past Surgical History:   Procedure Laterality Date     HERNIA REPAIR, INGUINAL RT/LT Right 1976     HERNIORRHAPHY INGUINAL Left 7/10/2015    Procedure: HERNIORRHAPHY INGUINAL;  Surgeon: Vicente Aleman MD;  Location: [facility]     SURGICAL HISTORY OF -  Right 7/28/2003-04    Tib/fib, ORIF, bone and skin grafting and arterial bypass     SURGICAL HISTORY OF -  Right 2008    Tib/fib, hardware removal.      TESTICLE SURGERY Right 1993    Torsion correction, and prev. undescended       Family History:    Family History   Problem Relation Age of Onset     Myocardial Infarction Father      Myocardial Infarction Maternal Grandfather      Myocardial Infarction Paternal Grandfather        Social History:  Marital Status:  Single [1]  Social History     Tobacco Use     Smoking status: Current Every Day Smoker     Packs/day: 1.00     Years: 17.00     Pack years: 17.00     Types: Cigarettes     Smokeless tobacco: Current User     Types: Chew   Substance Use Topics     Alcohol use: Yes     Alcohol/week: 0.0 standard drinks     Comment: daily     Drug use: Yes     Types: Marijuana     Comment: once a month        Medications:    acetaminophen (TYLENOL) 500 MG tablet  lamoTRIgine (LAMICTAL) 25 MG tablet          Review of Systems   Musculoskeletal: Positive for arthralgias (left lateral foot pain).   Skin: Negative for wound.   Neurological: Negative for weakness and numbness.   All other systems reviewed and are negative.      Physical Exam   BP: (!) 159/100  Pulse: 79  Temp: 98.3  F (36.8  C)  Resp: 16  Weight: 72.1 kg (159 lb)  SpO2: 98 %      Physical Exam  Vitals and nursing note reviewed.   Constitutional:       General: He is not in acute distress.  Musculoskeletal:        Feet:    Feet:      Left foot:      Skin integrity: Skin integrity normal.   Neurological:      Mental Status: He is alert.         ED Course                  Procedures              Critical Care time:  none               Results for orders placed or performed during the hospital encounter of 08/02/22 (from the past 24 hour(s))   Foot XR, G/E 3 views, left    Narrative    EXAM: XR FOOT LEFT G/E 3 VIEWS  LOCATION: [facility]  DATE/TIME: 8/2/2022 7:27 PM    INDICATION: fall off of scaffolding. Pain.  COMPARISON: None.      Impression    IMPRESSION: Normal joint spaces and alignment. No fracture.       Medications - No data to display    Assessments & Plan (with Medical Decision Making)  47-year-old to the ER secondary concerns of left lateral foot pain.  Patient fell off a scaffolding while at work about 1:00 this afternoon.  Increased pain upon driving home from work this evening.  Examination reveals tenderness to the proximal left fifth metatarsal.  No obvious deformity or swelling.  X-ray examination unremarkable for signs of fracture.  Patient told of the potential occult fracture and then if he continues to have pain beyond 10 days to have it tay-rayed.  He was given instructions on the care of the area.  He had his own cam walker boot and will use that as support until his pain improves.  A note was generated for his employer.  Encourage follow-up in his clinic if not improved in the next 10 to 14 days.  To return the ER for increase or worsening pain as needed.     I have reviewed the nursing notes.    I have reviewed the findings, diagnosis, plan and need for follow up with the patient.       Discharge Medication List as of 8/2/2022  8:04 PM      START taking these medications    Details   acetaminophen (TYLENOL) 500 MG tablet Take 1-2 tablets (500-1,000 mg) by mouth every 6 hours as needed for pain, R-0, OTC                  I verbally discussed the findings of the evaluation today in the ER. I have verbally discussed with Cladue the suggested treatment(s) as described in the discharge instructions and handouts. I have prescribed  the above listed medications and instructed him on appropriate use of these medications.      I have verbally suggested he follow-up in his clinic or return to the ER for increased symptoms. See the follow-up recommendations documented  in the after visit summary in this visit's EPIC chart.      Disclaimer: This note consists of words and symbols derived from keyboarding and dictation using voice recognition software.  As a result, there may be errors that have gone undetected.  Please consider this when interpreting information found in this note.    Final diagnoses:   Contusion of fifth toe of left foot, initial encounter       8/2/2022   [facility] EMERGENCY DEPT     Arturo Madison, DO  08/02/22 2051

## 2023-07-05 DIAGNOSIS — G40.909 SEIZURE DISORDER (H): ICD-10-CM

## 2023-07-07 ENCOUNTER — OFFICE VISIT (OUTPATIENT)
Dept: NEUROLOGY | Facility: CLINIC | Age: 48
End: 2023-07-07
Payer: COMMERCIAL

## 2023-07-07 VITALS
HEART RATE: 72 BPM | BODY MASS INDEX: 23.42 KG/M2 | WEIGHT: 163.2 LBS | DIASTOLIC BLOOD PRESSURE: 89 MMHG | SYSTOLIC BLOOD PRESSURE: 129 MMHG

## 2023-07-07 DIAGNOSIS — G40.909 SEIZURE DISORDER (H): ICD-10-CM

## 2023-07-07 PROCEDURE — 99213 OFFICE O/P EST LOW 20 MIN: CPT | Performed by: PSYCHIATRY & NEUROLOGY

## 2023-07-07 RX ORDER — IBUPROFEN 200 MG
400-600 TABLET ORAL EVERY 4 HOURS PRN
COMMUNITY

## 2023-07-07 RX ORDER — LAMOTRIGINE 25 MG/1
TABLET ORAL
Qty: 630 TABLET | Refills: 3 | Status: SHIPPED | OUTPATIENT
Start: 2023-07-07 | End: 2024-09-26

## 2023-07-07 ASSESSMENT — PAIN SCALES - GENERAL: PAINLEVEL: NO PAIN (0)

## 2023-07-07 NOTE — LETTER
"    2023         RE: Claude Higgins  [id number] [facility] [address]        Dear Colleague,    Thank you for referring your patient, Claude Higgins, to the [facility] [place]. Please see a copy of my visit note below.     NEUROLOGY PROGRESS NOTE   [facility]    Patient:Claude Higgins  : 1975  Age: 48 year old  Today's Office Visit: 2023    Epilepsy History:      The patient's seizures started 2016.  He had 3 seizures in his life, the last one was 10/2016.  All his seizures were alcohol related.  He states he did binge drink the night before all his seizures.  He describes his seizure as no auras, and \"I just lose consciousness.\"  Witnesses have reported shaking all over, making noises, and stiffening up.  He had urinary incontinence with his last seizure, denies tongue biting, major injuries or car accidents.  He tried levetiracetam, but it caused slow thinking and tiredness. Then he was started on lamotrigine, which he tolerates well.   Has a h/o amphetamine use, sober since  and heavy alcohol drinking.     History of present illness:     Claude returns to the clinic for a follow up on his seizures. He was last seen 2022.  He is taking lamotrigine 100-75.  He denies side effects.     He has difficulty sleeping at night, and \"can't shut my mind down\". He says he smokes marijuana to be able to sleep.        Current Outpatient Medications   Medication Sig Dispense Refill     ibuprofen (ADVIL/MOTRIN) 200 MG tablet Take 400-600 mg by mouth every 4 hours as needed for pain       lamoTRIgine (LAMICTAL) 25 MG tablet Take 4 tabs in the morning and 3 tabs in the evening 630 tablet 3     Exam:    /89   Pulse 72   Wt 163 lb 3.2 oz (74 kg)   BMI 23.42 kg/m       Wt Readings from Last 5 Encounters:   23 163 lb 3.2 oz (74 kg)   22 159 lb (72.1 kg)   22 159 lb 9.6 oz (72.4 kg)   22 170 lb 14.4 oz (77.5 kg)   21 " 160 lb (72.6 kg)     General Appearance: Alert, awake, cooperative, pleasant, NAD  Gait: steady, slight discomfort with tandem gait   Attention Span:  Normal  Language/speech: no aphasia or dysarthria  Extraocular Movements:  Normal  Coordination:  Normal finger to nose  Facial Strength:  Normal  Motor Exam: normal tone, bulk and strength 5/5 bilaterally    Assessment/Plan:    1. Convulsions: Normal EEG and brain MRI. All his seizures were all alcohol related. He still drinks 3-4 strong alcoholic drinks daily. Lamotrigine was increased to  due to subtherapeutic level. He wants to continue taking lamotrigine and doesn't feel safe coming off of it.       2. Anxiety: He doesn't any medication.  He has difficulty falling asleep. He smokes marijuana to fall asleep. He was counseled against smoking marijuana, because in long term it may even worsen his anxiety and sleep.        - Continue lamotrigine 100-75 mg per day    - RTC in 1 year.        As described above, I met with the patient for 20 minutes and during this time counseling was greater than 50% of the visit time.  Lorenza Hinson MD        Again, thank you for allowing me to participate in the care of your patient.        Sincerely,        Lorenza Hinson MD

## 2023-07-07 NOTE — PROGRESS NOTES
" NEUROLOGY PROGRESS NOTE   [facility]    Patient:Claude Higgins  : 1975  Age: 48 year old  Today's Office Visit: 2023    Epilepsy History:      The patient's seizures started 2016.  He had 3 seizures in his life, the last one was 10/2016.  All his seizures were alcohol related.  He states he did binge drink the night before all his seizures.  He describes his seizure as no auras, and \"I just lose consciousness.\"  Witnesses have reported shaking all over, making noises, and stiffening up.  He had urinary incontinence with his last seizure, denies tongue biting, major injuries or car accidents.  He tried levetiracetam, but it caused slow thinking and tiredness. Then he was started on lamotrigine, which he tolerates well.   Has a h/o amphetamine use, sober since  and heavy alcohol drinking.     History of present illness:     Claude returns to the clinic for a follow up on his seizures. He was last seen 2022.  He is taking lamotrigine 100-75.  He denies side effects.     He has difficulty sleeping at night, and \"can't shut my mind down\". He says he smokes marijuana to be able to sleep.        Current Outpatient Medications   Medication Sig Dispense Refill     ibuprofen (ADVIL/MOTRIN) 200 MG tablet Take 400-600 mg by mouth every 4 hours as needed for pain       lamoTRIgine (LAMICTAL) 25 MG tablet Take 4 tabs in the morning and 3 tabs in the evening 630 tablet 3     Exam:    /89   Pulse 72   Wt 163 lb 3.2 oz (74 kg)   BMI 23.42 kg/m       Wt Readings from Last 5 Encounters:   23 163 lb 3.2 oz (74 kg)   22 159 lb (72.1 kg)   22 159 lb 9.6 oz (72.4 kg)   22 170 lb 14.4 oz (77.5 kg)   21 160 lb (72.6 kg)     General Appearance: Alert, awake, cooperative, pleasant, NAD  Gait: steady, slight discomfort with tandem gait   Attention Span:  Normal  Language/speech: no aphasia or dysarthria  Extraocular Movements:  Normal  Coordination:  Normal finger to " nose  Facial Strength:  Normal  Motor Exam: normal tone, bulk and strength 5/5 bilaterally    Assessment/Plan:    1. Convulsions: Normal EEG and brain MRI. All his seizures were all alcohol related. He still drinks 3-4 strong alcoholic drinks daily. Lamotrigine was increased to  due to subtherapeutic level. He wants to continue taking lamotrigine and doesn't feel safe coming off of it.       2. Anxiety: He doesn't any medication.  He has difficulty falling asleep. He smokes marijuana to fall asleep. He was counseled against smoking marijuana, because in long term it may even worsen his anxiety and sleep.        - Continue lamotrigine 100-75 mg per day    - RTC in 1 year.        As described above, I met with the patient for 20 minutes and during this time counseling was greater than 50% of the visit time.  Lorenza Hinson MD     Non-Graft Cartilage Fenestration Text: The cartilage was fenestrated with a 2mm punch biopsy to help facilitate healing.

## 2023-07-10 RX ORDER — LAMOTRIGINE 25 MG/1
TABLET ORAL
Qty: 630 TABLET | Refills: 3 | OUTPATIENT
Start: 2023-07-10

## 2023-08-26 ENCOUNTER — HEALTH MAINTENANCE LETTER (OUTPATIENT)
Age: 48
End: 2023-08-26

## 2024-04-07 ENCOUNTER — HOSPITAL ENCOUNTER (EMERGENCY)
Facility: CLINIC | Age: 49
Discharge: HOME | End: 2024-04-07
Attending: FAMILY MEDICINE | Admitting: FAMILY MEDICINE
Payer: COMMERCIAL

## 2024-04-07 ENCOUNTER — APPOINTMENT (OUTPATIENT)
Dept: GENERAL RADIOLOGY | Facility: CLINIC | Age: 49
End: 2024-04-07
Attending: FAMILY MEDICINE
Payer: COMMERCIAL

## 2024-04-07 VITALS
TEMPERATURE: 98.1 F | HEIGHT: 70 IN | RESPIRATION RATE: 18 BRPM | HEART RATE: 82 BPM | WEIGHT: 169.6 LBS | SYSTOLIC BLOOD PRESSURE: 149 MMHG | OXYGEN SATURATION: 97 % | BODY MASS INDEX: 24.28 KG/M2 | DIASTOLIC BLOOD PRESSURE: 97 MMHG

## 2024-04-07 DIAGNOSIS — M25.562 ACUTE PAIN OF LEFT KNEE: ICD-10-CM

## 2024-04-07 PROCEDURE — 73562 X-RAY EXAM OF KNEE 3: CPT | Mod: LT

## 2024-04-07 PROCEDURE — 99283 EMERGENCY DEPT VISIT LOW MDM: CPT | Performed by: FAMILY MEDICINE

## 2024-04-07 ASSESSMENT — ACTIVITIES OF DAILY LIVING (ADL): ADLS_ACUITY_SCORE: 35

## 2024-04-07 ASSESSMENT — COLUMBIA-SUICIDE SEVERITY RATING SCALE - C-SSRS
2. HAVE YOU ACTUALLY HAD ANY THOUGHTS OF KILLING YOURSELF IN THE PAST MONTH?: NO
1. IN THE PAST MONTH, HAVE YOU WISHED YOU WERE DEAD OR WISHED YOU COULD GO TO SLEEP AND NOT WAKE UP?: NO
6. HAVE YOU EVER DONE ANYTHING, STARTED TO DO ANYTHING, OR PREPARED TO DO ANYTHING TO END YOUR LIFE?: NO

## 2024-04-07 NOTE — ED PROVIDER NOTES
ED Provider Note     Claude Higgins  [id number]  April 7, 2024      CC:     Chief Complaint   Patient presents with    Knee Pain          History is obtained from patient.  He is accompanied by his significant other.    HPI: Claude Higgins is a 49 year old male presenting with 1 and half week history of left knee pain, with occasional clicking.  Patient states that he was working a job, on his knees for about 3 to 4 hours.  Started develop some swelling and pain after this and it has persisted.  Pain is mostly in the medial aspect of the joint and the knee was much more swollen.  Over this week and a half, the swelling has subsided slightly but he still unable to kneel or put any weight on the knee.  He has been using lidocaine patch and has a neoprene sleeve splint on.  He does not recall having any twisting injury to the knee.  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 He has had previous injuries to his right leg, so his left leg is supposed to be a good 1.  He has had a lot of wear and tear to his shoulders and back.  He does not have a primary care provider.  He has a history of alcohol abuse, with a history of suspected alcohol-related seizures.  He thinks that his seizures were from the energy drinks that he was consuming with the alcohol.  He continues to have regular alcohol intake, and was told by his neurologist that he could either stop drinking or continue taking his lamotrigine.  Patient has been taking some ibuprofen for the pain, but not today.  Current pain level today is 6 out of 10.  Pain is much worse if he tries to kneel.    PMH/Problem List:   Past Medical History:   Diagnosis Date    Alcohol use     GERD (gastroesophageal reflux disease)     Inguinal hernia 2015    Tobacco abuse        PSH:   Past Surgical History:   Procedure Laterality  "Date    HERNIA REPAIR, INGUINAL RT/LT Right 1976    HERNIORRHAPHY INGUINAL Left 7/10/2015    Procedure: HERNIORRHAPHY INGUINAL;  Surgeon: Vicente Aleman MD;  Location: [facility] OR    SURGICAL HISTORY OF -  Right 7/28/2003-04    Tib/fib, ORIF, bone and skin grafting and arterial bypass    SURGICAL HISTORY OF -  Right 2008    Tib/fib, hardware removal.     TESTICLE SURGERY Right 1993    Torsion correction, and prev. undescended       MEDS:   No current facility-administered medications for this encounter.     Current Outpatient Medications   Medication Sig Dispense Refill    ibuprofen (ADVIL/MOTRIN) 200 MG tablet Take 400-600 mg by mouth every 4 hours as needed for pain      lamoTRIgine (LAMICTAL) 25 MG tablet Take 4 tabs in the morning and 3 tabs in the evening 630 tablet 3       Allergies: No known allergies    Triage and nursing notes were reviewed.    ROS: All other systems were reviewed and are negative    Physical Exam:  Vitals:    04/07/24 1122   BP: (!) 149/97   Pulse: 82   Resp: 18   Temp: 98.1  F (36.7  C)   TempSrc: Oral   SpO2: 97%   Weight: 76.9 kg (169 lb 9.6 oz)   Height: 1.778 m (5' 10\")     GENERAL APPEARANCE: Alert, no distress  HEAD: atraumatic  EYES: PER; sclera is nonicteric  HENT: External exam  RESP: Normal respiratory effort  EXT: Left knee reveals no significant prepatellar bursitis or swelling.  The knee Is nontender.  It tracks normal from side-to-side.  There is tenderness along the medial joint line with some swelling noted.  SKIN: no rash; as above      Labs/Imaging Results:  Results for orders placed or performed during the hospital encounter of 04/07/24 (from the past 24 hour(s))   XR Knee Left 3 Views    Narrative    EXAM: XR KNEE LEFT 3 VIEWS  LOCATION: [facility]  DATE: 4/7/2024    INDICATION: Left knee pain and swelling.  COMPARISON: None.      Impression    IMPRESSION: Normal joint spaces and alignment. No fracture or joint effusion. "           Impression:  Final diagnoses:   Acute pain of left knee         ED Course & Medical Decision Making (Plan):  Claude Higgins is a 49 year old male with 1-1/2-week history of left knee pain, with onset of swelling and pain after he was kneeling for several hours.  Patient does work on his knees quite a bit and usually wears a knee pad.  On this particular day a week and a half ago he was not.  He did not have any other injuries that he could recall that involved any planting and twisting or popping of his knee.  He states that now he is having a popping sensation in that knee and that the pain is mostly on the medial aspect.    Vital signs reveal a temp of 98.1, blood pressure 149/97, pulse of 82, respiration of 18, 97% oxygen saturation.  On exam, the left knee reveals some swelling over the medial joint space.  There is tenderness in the medial joint space.  Patella is located normally, without any significant pain with palpation.  Patient is able to extend fully, and flexion is to 90 degrees.  Negative drawer signs.  Some pain reproduced with valgus and varus stress of the knee.  Calf is nontender.  No significant pain with passive stretching at the ankle.    X-ray of the left knee reveals normal joint spaces and alignment.  No fracture or joint effusion.  Patient was wearing a very loose fitting and used knee brace.  I recommended a new more snug fitting knee brace for now.  Patient will be referred to sports medicine for further assessment.  I suspect that he may have had a medial collateral ligament strain versus meniscus injury.  Swelling is reportedly much improved compared to initial injury.  Patient expressed understanding and agreement with discharge instructions below.        Written after-visit summary and instructions were given at the time of discharge.          Discharge Instructions:  The x-rays of your left knee are very reassuring.  You have nice well-preserved knee joint.  There is no  obvious fluid seen on the x-ray.  You may have a meniscus injury or could be a strain of your medial collateral ligament.  Continue to exercise the thigh muscles, but get a well-fitting snug neoprene brace for the knee.  Follow-up with the sports medicine specialist.  Expect a phone call in the next several days to get scheduled.        Disclaimer: This note consists of words and symbols derived from keyboarding and dictation using voice recognition software.  As a result, there may be errors that have gone undetected.  Please consider this when interpreting information found in this note.       Annabelle Sapp MD  04/07/24 2078

## 2024-04-07 NOTE — ED TRIAGE NOTES
Comes in with left knee pain that he has had for little over a week. States was working on knees when this started hurting and the pain hasn't let up.      Triage Assessment (Adult)       Row Name 04/07/24 1124          Triage Assessment    Airway WDL WDL        Respiratory WDL    Respiratory WDL WDL        Skin Circulation/Temperature WDL    Skin Circulation/Temperature WDL WDL        Cardiac WDL    Cardiac WDL WDL        Peripheral/Neurovascular WDL    Peripheral Neurovascular WDL WDL        Cognitive/Neuro/Behavioral WDL    Cognitive/Neuro/Behavioral WDL WDL

## 2024-04-07 NOTE — DISCHARGE INSTRUCTIONS
The x-rays of your left knee are very reassuring.  You have nice well-preserved knee joint.  There is no obvious fluid seen on the x-ray.  You may have a meniscus injury or could be a strain of your medial collateral ligament.  Continue to exercise the thigh muscles, but get a well-fitting snug neoprene brace for the knee.  Follow-up with the sports medicine specialist.  Expect a phone call in the next several days to get scheduled.

## 2024-04-08 ENCOUNTER — TELEPHONE (OUTPATIENT)
Dept: ORTHOPEDICS | Facility: CLINIC | Age: 49
End: 2024-04-08
Payer: COMMERCIAL

## 2024-04-08 NOTE — TELEPHONE ENCOUNTER
Appointment: Pt thinks he has soft tissue damage. No findings on Xray.  Can't bend or kneel, has the week off and is doing RICE.  He needs to be seen.  Can anyone work him in this week. He will be out for the next 2 weeks for work. Please contact him if one of our providers can see him.  He would like to discuss an MRI.     Could we send this information to you in [facility] or would you prefer to receive a phone call?:   Patient would prefer a phone call   Okay to leave a detailed message?: Yes at Cell number on file:    Telephone Information:   Mobile [phone]

## 2024-04-08 NOTE — TELEPHONE ENCOUNTER
Tita Del Rio  You; [facility] Scheduling5 minutes ago (3:45 PM)     TL  Pt is scheduled to see Matti Marcano tomorrow      You  [facility] Scheduling1 hour ago (2:05 PM)     LN  Please help patient schedule with ortho    Joann Tineo, RN on 4/8/2024 at 1:57 PM      Zena Leslie L  You2 hours ago (1:40 PM)     BS  My scheduling criteria said to schedule with sports med.      You  Zena Leslie L2 hours ago (1:33 PM)     LN  There are plenty of ortho appointments available in [place]. Is there a reason you didn't schedule the patient?    Joann Tineo, RN on 4/8/2024 at 1:32 PM

## 2024-04-09 ENCOUNTER — ANCILLARY PROCEDURE (OUTPATIENT)
Dept: GENERAL RADIOLOGY | Facility: CLINIC | Age: 49
End: 2024-04-09
Attending: PHYSICIAN ASSISTANT
Payer: COMMERCIAL

## 2024-04-09 ENCOUNTER — OFFICE VISIT (OUTPATIENT)
Dept: ORTHOPEDICS | Facility: CLINIC | Age: 49
End: 2024-04-09
Payer: COMMERCIAL

## 2024-04-09 VITALS
WEIGHT: 170 LBS | HEIGHT: 70 IN | BODY MASS INDEX: 24.34 KG/M2 | TEMPERATURE: 98.5 F | SYSTOLIC BLOOD PRESSURE: 114 MMHG | DIASTOLIC BLOOD PRESSURE: 76 MMHG

## 2024-04-09 DIAGNOSIS — M25.562 ACUTE PAIN OF LEFT KNEE: ICD-10-CM

## 2024-04-09 DIAGNOSIS — S83.242A ACUTE MEDIAL MENISCUS TEAR OF LEFT KNEE, INITIAL ENCOUNTER: Primary | ICD-10-CM

## 2024-04-09 DIAGNOSIS — M25.562 LEFT KNEE PAIN: ICD-10-CM

## 2024-04-09 PROCEDURE — 73560 X-RAY EXAM OF KNEE 1 OR 2: CPT | Mod: TC | Performed by: RADIOLOGY

## 2024-04-09 PROCEDURE — 20610 DRAIN/INJ JOINT/BURSA W/O US: CPT | Mod: LT | Performed by: PHYSICIAN ASSISTANT

## 2024-04-09 PROCEDURE — 99203 OFFICE O/P NEW LOW 30 MIN: CPT | Mod: 25 | Performed by: PHYSICIAN ASSISTANT

## 2024-04-09 RX ORDER — BUPIVACAINE HYDROCHLORIDE 5 MG/ML
3 INJECTION, SOLUTION PERINEURAL
Status: SHIPPED | OUTPATIENT
Start: 2024-04-09

## 2024-04-09 RX ORDER — TRIAMCINOLONE ACETONIDE 40 MG/ML
80 INJECTION, SUSPENSION INTRA-ARTICULAR; INTRAMUSCULAR
Status: SHIPPED | OUTPATIENT
Start: 2024-04-09

## 2024-04-09 RX ADMIN — BUPIVACAINE HYDROCHLORIDE 3 ML: 5 INJECTION, SOLUTION PERINEURAL at 14:15

## 2024-04-09 RX ADMIN — TRIAMCINOLONE ACETONIDE 80 MG: 40 INJECTION, SUSPENSION INTRA-ARTICULAR; INTRAMUSCULAR at 14:15

## 2024-04-09 ASSESSMENT — PAIN SCALES - GENERAL: PAINLEVEL: MODERATE PAIN (4)

## 2024-04-09 NOTE — LETTER
4/9/2024         RE: Claude Higgins  [address] [facility] [id number]        Dear Colleague,    Thank you for referring your patient, Claude Higgins, to the [facility]. Please see a copy of my visit note below.    ORTHOPEDIC CONSULT      Chief Complaint: Claude Higgins is a 49 year old male who works as a .  Enjoys [employer] and has a [employer].  Goes to [employer].      Left knee pain        History of Present Illness:   Mechanism of Injury: No specific injury that he can think about 2 weeks ago he was doing a lot of kneeling and the pain started after that.  Location: Left medial knee  Duration of Pain: 2 weeks  Rating of Pain: 4 out of 10  Pain Quality: Sharp with twisting  Pain is better with: Rest  Pain is worse with: Twisting knee  Treatment so far consists of: Patient had a week and a half of left knee pain and went into the emergency department where they did x-rays which were reassuring.  The provider thought that they possibly had an MCL strain or a meniscus tear.  Patient was to follow-up with orthopedics.  Patient has tried elevation and ice and heat which have helped.  Patient usually takes ibuprofen 600 mg just once a day and that does help.  Patient has not done any Tylenol ibuprofen or physical therapy.   Associated Features: Denies numbness or tingling shooting burning electric pain.  Denies any fevers chills nausea or vomiting.  Prior history of related problems: No previous surgery or trauma.  Pain is Limiting: Comfortable twisting of knee  Here to: Orthopedic consultation  The Pain Has: Been about the same  Additional History: Patient has noticed clicking when he twists but the clicking does not hurt.  Patient denies any catching or locking.  Patient states the knee was swollen before.  Patient does not feel that it is unstable.      Patient's past medical, surgical, social and family histories reviewed.     Past Medical History:    Diagnosis Date     Alcohol use     Out-patient chemical dependancy treatment in 2003     GERD (gastroesophageal reflux disease)      Inguinal hernia 2015    left     Tobacco abuse         Past Surgical History:   Procedure Laterality Date     HERNIA REPAIR, INGUINAL RT/LT Right 1976     HERNIORRHAPHY INGUINAL Left 7/10/2015    Procedure: HERNIORRHAPHY INGUINAL;  Surgeon: Vicente Aleman MD;  Location: [facility]     SURGICAL HISTORY OF -  Right 7/28/2003-04    Tib/fib, ORIF, bone and skin grafting and arterial bypass     SURGICAL HISTORY OF -  Right 2008    Tib/fib, hardware removal.      TESTICLE SURGERY Right 1993    Torsion correction, and prev. undescended       Medications:  Current Outpatient Medications   Medication Sig Dispense Refill     ibuprofen (ADVIL/MOTRIN) 200 MG tablet Take 400-600 mg by mouth every 4 hours as needed for pain       lamoTRIgine (LAMICTAL) 25 MG tablet Take 4 tabs in the morning and 3 tabs in the evening 630 tablet 3     No current facility-administered medications for this visit.       Allergies   Allergen Reactions     No Known Allergies        Social History     Occupational History     Occupation: Construction     Comment: [employer]   Tobacco Use     Smoking status: Every Day     Packs/day: 1.00     Years: 17.00     Additional pack years: 0.00     Total pack years: 17.00     Types: Cigarettes     Smokeless tobacco: Current     Types: Chew   Substance and Sexual Activity     Alcohol use: Yes     Alcohol/week: 0.0 standard drinks of alcohol     Comment: daily     Drug use: Yes     Types: Marijuana     Comment: once a month     Sexual activity: Yes     Partners: Female       Family History   Problem Relation Age of Onset     Myocardial Infarction Father      Myocardial Infarction Maternal Grandfather      Myocardial Infarction Paternal Grandfather        REVIEW OF SYSTEMS  10 point review systems performed otherwise negative as noted as per history of present illness.    Physical  "Exam:  Vitals: /76 (BP Location: Left arm, Cuff Size: Adult Regular)   Temp 98.5  F (36.9  C) (Temporal)   Ht 1.778 m (5' 10\")   Wt 77.1 kg (170 lb)   BMI 24.39 kg/m    BMI= Body mass index is 24.39 kg/m .    Constitutional: healthy, alert and no acute distress   Psychiatric: mentation appears normal and affect normal/bright  NEURO: no focal deficits, CMS intact left lower extremity   RESP: Normal with easy respirations and no use of accessory muscles to breathe, no audible wheezing or retractions  CV: Calf soft and nontender to palpation, leg warm   SKIN: No erythema, rashes, excoriation, or breakdown. No evidence of infection.   MUSCULOSKELETAL:  INSPECTION of left knee: No gross deformities, erythema, edema, ecchymosis, atrophy or fasciculations.   PALPATION: Medial joint line tenderness noted.  No tenderness lateral, anterior and posterior portion of the knee. No specific joint line tenderness on the lateral side. No prepatella bursa tenderness or pes bursa tenderness. No increased warmth.  No effusion.    ROM: Passive: Extension full, flexion to 125 . All range of motion without catching, locking or pain.     STRENGTH: 5 out of 5 quad and hamstring without pain.   SPECIAL TEST: Patient has a negative Lachman's negative drawer sign. Patient's knee is stable to varus and valgus stress at 30  of flexion. Patient has a positive Freddy's.   GAIT: non-antalgic  Lymph: no palpable lymph nodes    Diagnostic Modalities:  Recent Results (from the past 744 hour(s))   XR Knee Left 3 Views    Narrative    EXAM: XR KNEE LEFT 3 VIEWS  LOCATION: [facility]  DATE: 4/7/2024    INDICATION: Left knee pain and swelling.  COMPARISON: None.      Impression    IMPRESSION: Normal joint spaces and alignment. No fracture or joint effusion.     I agree with the above readings.    X-rays done today showing no fracture of the patella femoral joint and slight joint spacing narrowing. Patella " sitting central in the trochlea groove.  No tumor no dislocation.     Independent visualization of the images was performed.    Impression: 1.  Possible left knee medial meniscus tear    Plan:  All of the above pertinent physical exam and imaging modalities findings was reviewed with Phylicia    Large Joint Injection/Arthocentesis: L knee joint    Date/Time: 4/9/2024 2:15 PM    Performed by: Germain Marcano PA-C  Authorized by: Germain Marcano PA-C    Indications:  Pain  Needle Size:  22 G  Guidance: landmark guided    Approach:  Anterolateral  Location:  Knee      Medications:  80 mg triamcinolone 40 MG/ML; 3 mL BUPivacaine 0.5 %  Aspirate amount (mL):  0  Procedure discussed: discussed risks, benefits, and alternatives    Consent Given by:  Patient  Timeout: timeout called immediately prior to procedure    Prep: patient was prepped and draped in usual sterile fashion     The skin was prepped with betadine. The patient was in a seated position. I used gavin chloride spray prior to doing the injection.  The patient tolerated the injection well, and there were no complications. The injection site was covered with a Band-Aid.       FOCUSED PLAN:  Patient with 2 weeks of medial knee pain and stable on painful knee with valgus stress and positive Freddy's and aggravation with twisting and clicking.  Patient not having any catching or locking.  I decided to proceed with a steroid injection after we discussed MRI versus injection.  We will see if the meniscus comes down with this injection and perhaps the patient will not need a MRI or arthroscopy.  If he continues to have symptoms after the injection we will order an MRI and follow-up virtually and possibly refer to for an arthroscopy.  Follow-up as needed    Re-x-ray on return: No      This note was dictated with [employer].    Germain Marcano PA-C        Again, thank you for allowing me to participate in the care of your patient.        Sincerely,        Germain Marcano  JAXON

## 2024-04-09 NOTE — PROGRESS NOTES
ORTHOPEDIC CONSULT      Chief Complaint: Claude Higgins is a 49 year old male who works as a .  Enjoys [employer] and has a [employer].  Goes to [employer].      Left knee pain        History of Present Illness:   Mechanism of Injury: No specific injury that he can think about 2 weeks ago he was doing a lot of kneeling and the pain started after that.  Location: Left medial knee  Duration of Pain: 2 weeks  Rating of Pain: 4 out of 10  Pain Quality: Sharp with twisting  Pain is better with: Rest  Pain is worse with: Twisting knee  Treatment so far consists of: Patient had a week and a half of left knee pain and went into the emergency department where they did x-rays which were reassuring.  The provider thought that they possibly had an MCL strain or a meniscus tear.  Patient was to follow-up with orthopedics.  Patient has tried elevation and ice and heat which have helped.  Patient usually takes ibuprofen 600 mg just once a day and that does help.  Patient has not done any Tylenol ibuprofen or physical therapy.   Associated Features: Denies numbness or tingling shooting burning electric pain.  Denies any fevers chills nausea or vomiting.  Prior history of related problems: No previous surgery or trauma.  Pain is Limiting: Comfortable twisting of knee  Here to: Orthopedic consultation  The Pain Has: Been about the same  Additional History: Patient has noticed clicking when he twists but the clicking does not hurt.  Patient denies any catching or locking.  Patient states the knee was swollen before.  Patient does not feel that it is unstable.      Patient's past medical, surgical, social and family histories reviewed.     Past Medical History:   Diagnosis Date    Alcohol use     Out-patient chemical dependancy treatment in 2003    GERD (gastroesophageal reflux disease)     Inguinal hernia 2015    left    Tobacco abuse         Past Surgical History:   Procedure Laterality Date    HERNIA REPAIR, INGUINAL  "RT/LT Right 1976    HERNIORRHAPHY INGUINAL Left 7/10/2015    Procedure: HERNIORRHAPHY INGUINAL;  Surgeon: Vicente Aleman MD;  Location: [facility] OR    SURGICAL HISTORY OF -  Right 7/28/2003-04    Tib/fib, ORIF, bone and skin grafting and arterial bypass    SURGICAL HISTORY OF -  Right 2008    Tib/fib, hardware removal.     TESTICLE SURGERY Right 1993    Torsion correction, and prev. undescended       Medications:  Current Outpatient Medications   Medication Sig Dispense Refill    ibuprofen (ADVIL/MOTRIN) 200 MG tablet Take 400-600 mg by mouth every 4 hours as needed for pain      lamoTRIgine (LAMICTAL) 25 MG tablet Take 4 tabs in the morning and 3 tabs in the evening 630 tablet 3     No current facility-administered medications for this visit.       Allergies   Allergen Reactions    No Known Allergies        Social History     Occupational History    Occupation: [employer]     Comment: [employer]   Tobacco Use    Smoking status: Every Day     Packs/day: 1.00     Years: 17.00     Additional pack years: 0.00     Total pack years: 17.00     Types: Cigarettes    Smokeless tobacco: Current     Types: Chew   Substance and Sexual Activity    Alcohol use: Yes     Alcohol/week: 0.0 standard drinks of alcohol     Comment: daily    Drug use: Yes     Types: Marijuana     Comment: once a month    Sexual activity: Yes     Partners: Female       Family History   Problem Relation Age of Onset    Myocardial Infarction Father     Myocardial Infarction Maternal Grandfather     Myocardial Infarction Paternal Grandfather        REVIEW OF SYSTEMS  10 point review systems performed otherwise negative as noted as per history of present illness.    Physical Exam:  Vitals: /76 (BP Location: Left arm, Cuff Size: Adult Regular)   Temp 98.5  F (36.9  C) (Temporal)   Ht 1.778 m (5' 10\")   Wt 77.1 kg (170 lb)   BMI 24.39 kg/m    BMI= Body mass index is 24.39 kg/m .    Constitutional: healthy, alert and no acute distress   Psychiatric: mentation " appears normal and affect normal/bright  NEURO: no focal deficits, CMS intact left lower extremity   RESP: Normal with easy respirations and no use of accessory muscles to breathe, no audible wheezing or retractions  CV: Calf soft and nontender to palpation, leg warm   SKIN: No erythema, rashes, excoriation, or breakdown. No evidence of infection.   MUSCULOSKELETAL:  INSPECTION of left knee: No gross deformities, erythema, edema, ecchymosis, atrophy or fasciculations.   PALPATION: Medial joint line tenderness noted.  No tenderness lateral, anterior and posterior portion of the knee. No specific joint line tenderness on the lateral side. No prepatella bursa tenderness or pes bursa tenderness. No increased warmth.  No effusion.    ROM: Passive: Extension full, flexion to 125 . All range of motion without catching, locking or pain.     STRENGTH: 5 out of 5 quad and hamstring without pain.   SPECIAL TEST: Patient has a negative Lachman's negative drawer sign. Patient's knee is stable to varus and valgus stress at 30  of flexion. Patient has a positive Freddy's.   GAIT: non-antalgic  Lymph: no palpable lymph nodes    Diagnostic Modalities:  Recent Results (from the past 744 hour(s))   XR Knee Left 3 Views    Narrative    EXAM: XR KNEE LEFT 3 VIEWS  LOCATION: [facility]  DATE: 4/7/2024    INDICATION: Left knee pain and swelling.  COMPARISON: None.      Impression    IMPRESSION: Normal joint spaces and alignment. No fracture or joint effusion.     I agree with the above readings.    X-rays done today showing no fracture of the patella femoral joint and slight joint spacing narrowing. Patella sitting central in the trochlea groove.  No tumor no dislocation.     Independent visualization of the images was performed.    Impression: 1.  Possible left knee medial meniscus tear    Plan:  All of the above pertinent physical exam and imaging modalities findings was reviewed with Phylicia Srinivasan  Joint Injection/Arthocentesis: L knee joint    Date/Time: 4/9/2024 2:15 PM    Performed by: Germain Marcano PA-C  Authorized by: Germain Marcano PA-C    Indications:  Pain  Needle Size:  22 G  Guidance: landmark guided    Approach:  Anterolateral  Location:  Knee      Medications:  80 mg triamcinolone 40 MG/ML; 3 mL BUPivacaine 0.5 %  Aspirate amount (mL):  0  Procedure discussed: discussed risks, benefits, and alternatives    Consent Given by:  Patient  Timeout: timeout called immediately prior to procedure    Prep: patient was prepped and draped in usual sterile fashion     The skin was prepped with betadine. The patient was in a seated position. I used gavin chloride spray prior to doing the injection.  The patient tolerated the injection well, and there were no complications. The injection site was covered with a Band-Aid.       FOCUSED PLAN:  Patient with 2 weeks of medial knee pain and stable on painful knee with valgus stress and positive Freddy's and aggravation with twisting and clicking.  Patient not having any catching or locking.  I decided to proceed with a steroid injection after we discussed MRI versus injection.  We will see if the meniscus comes down with this injection and perhaps the patient will not need a MRI or arthroscopy.  If he continues to have symptoms after the injection we will order an MRI and follow-up virtually and possibly refer to for an arthroscopy.  Follow-up as needed    Re-x-ray on return: No      This note was dictated with [employer].    Germain Marcano PA-C

## 2024-08-07 ENCOUNTER — APPOINTMENT (OUTPATIENT)
Dept: GENERAL RADIOLOGY | Facility: CLINIC | Age: 49
End: 2024-08-07
Attending: EMERGENCY MEDICINE
Payer: OTHER MISCELLANEOUS

## 2024-08-07 ENCOUNTER — HOSPITAL ENCOUNTER (EMERGENCY)
Facility: CLINIC | Age: 49
Discharge: HOME | End: 2024-08-07
Attending: EMERGENCY MEDICINE | Admitting: EMERGENCY MEDICINE
Payer: OTHER MISCELLANEOUS

## 2024-08-07 VITALS
RESPIRATION RATE: 18 BRPM | OXYGEN SATURATION: 100 % | TEMPERATURE: 97.2 F | HEART RATE: 73 BPM | WEIGHT: 169 LBS | DIASTOLIC BLOOD PRESSURE: 111 MMHG | SYSTOLIC BLOOD PRESSURE: 160 MMHG | BODY MASS INDEX: 24.25 KG/M2

## 2024-08-07 DIAGNOSIS — M25.562 ACUTE PAIN OF LEFT KNEE: ICD-10-CM

## 2024-08-07 PROCEDURE — 99284 EMERGENCY DEPT VISIT MOD MDM: CPT

## 2024-08-07 PROCEDURE — 73562 X-RAY EXAM OF KNEE 3: CPT | Mod: LT

## 2024-08-07 PROCEDURE — 99284 EMERGENCY DEPT VISIT MOD MDM: CPT | Performed by: EMERGENCY MEDICINE

## 2024-08-07 RX ORDER — MELOXICAM 15 MG/1
15 TABLET ORAL DAILY
Qty: 14 TABLET | Refills: 0 | Status: SHIPPED | OUTPATIENT
Start: 2024-08-07 | End: 2024-08-21

## 2024-08-07 ASSESSMENT — ACTIVITIES OF DAILY LIVING (ADL)
ADLS_ACUITY_SCORE: 35
ADLS_ACUITY_SCORE: 35

## 2024-08-07 ASSESSMENT — COLUMBIA-SUICIDE SEVERITY RATING SCALE - C-SSRS
1. IN THE PAST MONTH, HAVE YOU WISHED YOU WERE DEAD OR WISHED YOU COULD GO TO SLEEP AND NOT WAKE UP?: NO
2. HAVE YOU ACTUALLY HAD ANY THOUGHTS OF KILLING YOURSELF IN THE PAST MONTH?: NO
6. HAVE YOU EVER DONE ANYTHING, STARTED TO DO ANYTHING, OR PREPARED TO DO ANYTHING TO END YOUR LIFE?: NO

## 2024-08-07 NOTE — DISCHARGE INSTRUCTIONS
Your knee x-ray did not show any broken bones, dislocation, or other acute injury.  However, you may still have other joint or soft tissue injury, that would be better evaluated with further imaging.  He may need to consider an MRI.  Referral was made for you to follow-up with the orthopedic provider in clinic.  Matti Marcano had seen you in April after another knee injury, and said that if no improvement after injection they would consider MRI.  May need to still consider doing further imaging on an outpatient basis    You may use the Voltaren gel up to 4 times daily as needed to help with mild to moderate pain.    Keep your knee elevated above the level of your heart, apply ice for 10 to 20 minutes several times per day to help reduce swelling, and may take Tylenol per bottle instructions as needed    You are also prescribed meloxicam to help with pain.  This can be taken once daily for total of 2 weeks.    You may wear your knee brace for comfort    If any new or worsening symptoms develop do not hesitate to return to the emergency room for evaluation

## 2024-08-07 NOTE — ED TRIAGE NOTES
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  Today it is swollen and painful.

## 2024-08-07 NOTE — Clinical Note
Claude Higgins was seen and treated in our emergency department on 8/7/2024.  He may return to work on 08/08/2024.       If you have any questions or concerns, please don't hesitate to call.      Zena Randhawa, DO

## 2024-08-07 NOTE — ED PROVIDER NOTES
History     Chief Complaint   Patient presents with    Knee Injury     HPI  Claude Higgins is a 49 year old male who presents with left knee pain.  He was kneeling at work yesterday, total about 7 and half hours.  Said that he took precautions and was wearing kneepads, but he felt a crunching sensation in his left knee.  Said that he continued working.  Is having pain today.  Also notices that his knee is swollen.  He has pain when putting weight on his knee, but says pain is almost worse when weight is off.  He previously been seen for knee injury, and went to orthopedic surgery clinic, where they gave him a knee injection.  He said it did not work and he continues to have pain.  Denies numbness or tingling in his toes    Allergies:  Allergies   Allergen Reactions    No Known Allergies        Problem List:    Patient Active Problem List    Diagnosis Date Noted    hx open tib-fib fracture 2003 06/08/2021     Priority: Medium    History of osteomyelitis 2003 06/08/2021     Priority: Medium    Closed nondisplaced fracture of navicular bone of right foot, initial encounter 06/08/2021     Priority: Medium    Equinus contracture of right ankle 06/08/2021     Priority: Medium    Shoulder strain, right, subsequent encounter 08/09/2017     Priority: Medium    Cannabis abuse 10/07/2016     Priority: Medium    Seizure disorder (H) 10/07/2016     Priority: Medium    Tobacco use disorder 07/02/2015     Priority: Medium    Unilateral inguinal hernia without obstruction or gangrene, recurrence not specified 07/02/2015     Priority: Medium    Alcohol use      Priority: Medium    GERD (gastroesophageal reflux disease)      Priority: Medium    Hyperlipidemia LDL goal <160 06/29/2015     Priority: Medium        Past Medical History:    Past Medical History:   Diagnosis Date    Alcohol use     GERD (gastroesophageal reflux disease)     Inguinal hernia 2015    Tobacco abuse        Past Surgical History:    Past Surgical History:    Procedure Laterality Date    HERNIA REPAIR, INGUINAL RT/LT Right 1976    HERNIORRHAPHY INGUINAL Left 7/10/2015    Procedure: HERNIORRHAPHY INGUINAL;  Surgeon: Vicente Aleman MD;  Location: [facility]    SURGICAL HISTORY OF -  Right 7/28/2003-04    Tib/fib, ORIF, bone and skin grafting and arterial bypass    SURGICAL HISTORY OF -  Right 2008    Tib/fib, hardware removal.     TESTICLE SURGERY Right 1993    Torsion correction, and prev. undescended       Family History:    Family History   Problem Relation Age of Onset    Myocardial Infarction Father     Myocardial Infarction Maternal Grandfather     Myocardial Infarction Paternal Grandfather        Social History:  Marital Status:  Single [1]  Social History     Tobacco Use    Smoking status: Every Day     Current packs/day: 1.00     Average packs/day: 1 pack/day for 17.0 years (17.0 ttl pk-yrs)     Types: Cigarettes    Smokeless tobacco: Current     Types: Chew   Substance Use Topics    Alcohol use: Yes     Alcohol/week: 0.0 standard drinks of alcohol     Comment: daily    Drug use: Yes     Types: Marijuana     Comment: once a month        Medications:    diclofenac (VOLTAREN) 1 % topical gel  meloxicam (MOBIC) 15 MG tablet  ibuprofen (ADVIL/MOTRIN) 200 MG tablet  lamoTRIgine (LAMICTAL) 25 MG tablet          Review of Systems   All other systems reviewed and are negative.      Physical Exam   BP: (!) 160/111  Pulse: 73  Temp: 97.2  F (36.2  C)  Resp: 18  Weight: 76.7 kg (169 lb)  SpO2: 100 %      Physical Exam  Vitals and nursing note reviewed.   Constitutional:       General: He is not in acute distress.  Cardiovascular:      Pulses:           Dorsalis pedis pulses are 2+ on the right side and 2+ on the left side.   Pulmonary:      Effort: Pulmonary effort is normal.   Musculoskeletal:      Right knee: Normal.      Left knee: Swelling present. No effusion, erythema or ecchymosis. Decreased range of motion. Tenderness present over the medial joint line.   Skin:      General: Skin is warm and dry.   Neurological:      Mental Status: He is alert.      Gait: Gait abnormal (Limping).         ED Course        Procedures              Critical Care time:  none               Results for orders placed or performed during the hospital encounter of 08/07/24 (from the past 24 hour(s))   XR Knee Left 3 Views    Narrative    XR KNEE LEFT 3 VIEWS 8/7/2024 8:31 AM     HISTORY: Knee pain, swelling    COMPARISON: None.         Impression    IMPRESSION: The left knee is made for fracture or compartmental  narrowing. No effusion.    TARIK NELSON MD         SYSTEM ID:  [id number]       Medications - No data to display    Assessments & Plan (with Medical Decision Making)  Claude is a 49 y.o M presenting with left knee pain after kneeling on it at work. See history and physical exam as above.  49 y.o M in no acute distress, hypertensive with BP of 160/111 thought to be secondary to pain. DP pulses intact bilaterally. Has tenderness of the left knee and no effusion, no erythema. Will get XR to evaluate. He drove himself to the ED today  XR results as above. NO acute fracture, no dislocation. Reviewed last visit with ortho. Patient received injection, and informed if no improvement to return for consideration of MRI. He did not follow up. Recommend he go back to ortho for follow up and likely to get MRI. Will prescribe Voltaren and Mobic to help with symptoms. ED return precautions discussed. Discharged in stable condition.     I have reviewed the nursing notes.    I have reviewed the findings, diagnosis, plan and need for follow up with the patient.           Medical Decision Making  The patient's presentation was of moderate complexity (a chronic illness mild to moderate exacerbation, progression, or side effect of treatment).    The patient's evaluation involved:  review of external note(s) from 1 sources (ortho)  ordering and/or review of 1 test(s) in this encounter (see separate area of note for  details)    The patient's management necessitated moderate risk (prescription drug management including medications given in the ED).        Discharge Medication List as of 8/7/2024  8:59 AM        START taking these medications    Details   diclofenac (VOLTAREN) 1 % topical gel Apply 4 g topically 4 times daily, Disp-100 g, R-0, E-Prescribe      meloxicam (MOBIC) 15 MG tablet Take 1 tablet (15 mg) by mouth daily for 14 days, Disp-14 tablet, R-0, E-Prescribe             Final diagnoses:   Acute pain of left knee       8/7/2024   [facility] EMERGENCY DEPT       Zena Randhawa,   08/07/24 2033

## 2024-08-08 ENCOUNTER — OFFICE VISIT (OUTPATIENT)
Dept: ORTHOPEDICS | Facility: CLINIC | Age: 49
End: 2024-08-08
Attending: EMERGENCY MEDICINE
Payer: OTHER MISCELLANEOUS

## 2024-08-08 VITALS
TEMPERATURE: 98.7 F | WEIGHT: 169 LBS | DIASTOLIC BLOOD PRESSURE: 95 MMHG | SYSTOLIC BLOOD PRESSURE: 153 MMHG | HEIGHT: 70 IN | BODY MASS INDEX: 24.2 KG/M2

## 2024-08-08 DIAGNOSIS — M25.562 ACUTE PAIN OF LEFT KNEE: ICD-10-CM

## 2024-08-08 LAB
CRP SERPL-MCNC: 4.16 MG/L
D DIMER PPP FEU-MCNC: 0.89 UG/ML FEU (ref 0–0.5)
ERYTHROCYTE [SEDIMENTATION RATE] IN BLOOD BY WESTERGREN METHOD: 25 MM/HR (ref 0–15)
TSH SERPL DL<=0.005 MIU/L-ACNC: 1.01 UIU/ML (ref 0.3–4.2)
URATE SERPL-MCNC: 7.4 MG/DL (ref 3.4–7)

## 2024-08-08 PROCEDURE — 86431 RHEUMATOID FACTOR QUANT: CPT | Performed by: ORTHOPAEDIC SURGERY

## 2024-08-08 PROCEDURE — 86038 ANTINUCLEAR ANTIBODIES: CPT | Performed by: ORTHOPAEDIC SURGERY

## 2024-08-08 PROCEDURE — 85379 FIBRIN DEGRADATION QUANT: CPT | Performed by: ORTHOPAEDIC SURGERY

## 2024-08-08 PROCEDURE — 86225 DNA ANTIBODY NATIVE: CPT | Performed by: ORTHOPAEDIC SURGERY

## 2024-08-08 PROCEDURE — 86140 C-REACTIVE PROTEIN: CPT | Performed by: ORTHOPAEDIC SURGERY

## 2024-08-08 PROCEDURE — 86039 ANTINUCLEAR ANTIBODIES (ANA): CPT | Performed by: ORTHOPAEDIC SURGERY

## 2024-08-08 PROCEDURE — 85652 RBC SED RATE AUTOMATED: CPT | Performed by: ORTHOPAEDIC SURGERY

## 2024-08-08 PROCEDURE — 84550 ASSAY OF BLOOD/URIC ACID: CPT | Performed by: ORTHOPAEDIC SURGERY

## 2024-08-08 PROCEDURE — 84443 ASSAY THYROID STIM HORMONE: CPT | Performed by: ORTHOPAEDIC SURGERY

## 2024-08-08 PROCEDURE — 86200 CCP ANTIBODY: CPT | Performed by: ORTHOPAEDIC SURGERY

## 2024-08-08 PROCEDURE — 84481 FREE ASSAY (FT-3): CPT | Performed by: ORTHOPAEDIC SURGERY

## 2024-08-08 PROCEDURE — 82306 VITAMIN D 25 HYDROXY: CPT | Performed by: ORTHOPAEDIC SURGERY

## 2024-08-08 PROCEDURE — 36415 COLL VENOUS BLD VENIPUNCTURE: CPT | Performed by: ORTHOPAEDIC SURGERY

## 2024-08-08 PROCEDURE — 99214 OFFICE O/P EST MOD 30 MIN: CPT | Performed by: ORTHOPAEDIC SURGERY

## 2024-08-08 NOTE — LETTER
"8/8/2024      Claude Higgins  [address] [facility] [address]      Dear Colleague,    Thank you for referring your patient, Claude Higgins, to the [facility]. Please see a copy of my visit note below.    S:  History of left knee symptoms.  Has had injection previously and discussed MRI with Matti Marcano PA-C 4/9/2024.  Continues to have symptoms.  Recently evaluated in ED:  \"Claude Higgins is a 49 year old male who presents with left knee pain.  He was kneeling at work yesterday, total about 7 and half hours.  Said that he took precautions and was wearing kneepads, but he felt a crunching sensation in his left knee.  Said that he continued working.  Is having pain today.  Also notices that his knee is swollen.  He has pain when putting weight on his knee, but says pain is almost worse when weight is off.  He previously been seen for knee injury, and went to orthopedic surgery clinic, where they gave him a knee injection.  He said it did not work and he continues to have pain.  Denies numbness or tingling in his toes \"         Patient Active Problem List   Diagnosis     Hyperlipidemia LDL goal <160     Tobacco use disorder     Unilateral inguinal hernia without obstruction or gangrene, recurrence not specified     Alcohol use     GERD (gastroesophageal reflux disease)     Cannabis abuse     Seizure disorder (H)     Shoulder strain, right, subsequent encounter     hx open tib-fib fracture 2003     History of osteomyelitis 2003     Closed nondisplaced fracture of navicular bone of right foot, initial encounter     Equinus contracture of right ankle            Past Medical History:   Diagnosis Date     Alcohol use     Out-patient chemical dependancy treatment in 2003     GERD (gastroesophageal reflux disease)      Inguinal hernia 2015    left     Tobacco abuse             Past Surgical History:   Procedure Laterality Date     HERNIA REPAIR, INGUINAL RT/LT Right 1976     " HERNIORRHAPHY INGUINAL Left 7/10/2015    Procedure: HERNIORRHAPHY INGUINAL;  Surgeon: Vicente Aleman MD;  Location: [facility]     SURGICAL HISTORY OF -  Right 7/28/2003-04    Tib/fib, ORIF, bone and skin grafting and arterial bypass     SURGICAL HISTORY OF -  Right 2008    Tib/fib, hardware removal.      TESTICLE SURGERY Right 1993    Torsion correction, and prev. undescended            Social History     Tobacco Use     Smoking status: Every Day     Current packs/day: 1.00     Average packs/day: 1 pack/day for 17.0 years (17.0 ttl pk-yrs)     Types: Cigarettes     Smokeless tobacco: Current     Types: Chew   Substance Use Topics     Alcohol use: Yes     Alcohol/week: 0.0 standard drinks of alcohol     Comment: daily            Family History   Problem Relation Age of Onset     Myocardial Infarction Father      Myocardial Infarction Maternal Grandfather      Myocardial Infarction Paternal Grandfather                Allergies   Allergen Reactions     No Known Allergies             Current Outpatient Medications   Medication Sig Dispense Refill     diclofenac (VOLTAREN) 1 % topical gel Apply 4 g topically 4 times daily 100 g 0     ibuprofen (ADVIL/MOTRIN) 200 MG tablet Take 400-600 mg by mouth every 4 hours as needed for pain       lamoTRIgine (LAMICTAL) 25 MG tablet Take 4 tabs in the morning and 3 tabs in the evening 630 tablet 3     meloxicam (MOBIC) 15 MG tablet Take 1 tablet (15 mg) by mouth daily for 14 days 14 tablet 0          Review Of Systems  Skin: negative  Eyes: negative  Ears/Nose/Throat: negative  Respiratory: No shortness of breath, dyspnea on exertion, cough, or hemoptysis    O: Physical Exam:  Pain to palpation over left knee anteromedial meniscal margin but even more so deep to MCL but no exacerbation with valgus stress.  No tenderness lateral joint line.  No effusion, no synovial complex.  Full flexion and extension L knee.  No evidence instability.  CMS intact to LLE.    Lab:update inflammatory  markers and arthritic profile    Images:XR KNEE LEFT 3 VIEWS 8/7/2024 8:31 AM      HISTORY: Knee pain, swelling     COMPARISON: None.                                                                          IMPRESSION: The left knee is made for fracture or compartmental  narrowing. No effusion.         A:  Meniscal pathology L knee      P:  obtain inflammatory markers and arthritic profile  MRI L knee  See back after studies  Discussed RICE and crutches if needed  Would like to wait on work until after MRI which is reasonable.  Will notify if exacerbation symptoms.             In addition to the above assessment and plan each active problem on Claude's problem list was evaluated today. This included the questioning of Claude for any medication problems. We will continue the current treatment plan for these active problems except as noted.        Again, thank you for allowing me to participate in the care of your patient.        Sincerely,        Germain Lanza MD

## 2024-08-08 NOTE — PROGRESS NOTES
"S:  History of left knee symptoms.  Has had injection previously and discussed MRI with Matti Marcano PA-C 4/9/2024.  Continues to have symptoms.  Recently evaluated in ED:  \"Claude Higgins is a 49 year old male who presents with left knee pain.  He was kneeling at work yesterday, total about 7 and half hours.  Said that he took precautions and was wearing kneepads, but he felt a crunching sensation in his left knee.  Said that he continued working.  Is having pain today.  Also notices that his knee is swollen.  He has pain when putting weight on his knee, but says pain is almost worse when weight is off.  He previously been seen for knee injury, and went to orthopedic surgery clinic, where they gave him a knee injection.  He said it did not work and he continues to have pain.  Denies numbness or tingling in his toes \"         Patient Active Problem List   Diagnosis    Hyperlipidemia LDL goal <160    Tobacco use disorder    Unilateral inguinal hernia without obstruction or gangrene, recurrence not specified    Alcohol use    GERD (gastroesophageal reflux disease)    Cannabis abuse    Seizure disorder (H)    Shoulder strain, right, subsequent encounter    hx open tib-fib fracture 2003    History of osteomyelitis 2003    Closed nondisplaced fracture of navicular bone of right foot, initial encounter    Equinus contracture of right ankle            Past Medical History:   Diagnosis Date    Alcohol use     Out-patient chemical dependancy treatment in 2003    GERD (gastroesophageal reflux disease)     Inguinal hernia 2015    left    Tobacco abuse             Past Surgical History:   Procedure Laterality Date    HERNIA REPAIR, INGUINAL RT/LT Right 1976    HERNIORRHAPHY INGUINAL Left 7/10/2015    Procedure: HERNIORRHAPHY INGUINAL;  Surgeon: Vicente Aleman MD;  Location: [facility]    SURGICAL HISTORY OF -  Right 7/28/2003-04    Tib/fib, ORIF, bone and skin grafting and arterial bypass    SURGICAL HISTORY OF -  Right 2008    " Tib/fib, hardware removal.     TESTICLE SURGERY Right 1993    Torsion correction, and prev. undescended            Social History     Tobacco Use    Smoking status: Every Day     Current packs/day: 1.00     Average packs/day: 1 pack/day for 17.0 years (17.0 ttl pk-yrs)     Types: Cigarettes    Smokeless tobacco: Current     Types: Chew   Substance Use Topics    Alcohol use: Yes     Alcohol/week: 0.0 standard drinks of alcohol     Comment: daily            Family History   Problem Relation Age of Onset    Myocardial Infarction Father     Myocardial Infarction Maternal Grandfather     Myocardial Infarction Paternal Grandfather                Allergies   Allergen Reactions    No Known Allergies             Current Outpatient Medications   Medication Sig Dispense Refill    diclofenac (VOLTAREN) 1 % topical gel Apply 4 g topically 4 times daily 100 g 0    ibuprofen (ADVIL/MOTRIN) 200 MG tablet Take 400-600 mg by mouth every 4 hours as needed for pain      lamoTRIgine (LAMICTAL) 25 MG tablet Take 4 tabs in the morning and 3 tabs in the evening 630 tablet 3    meloxicam (MOBIC) 15 MG tablet Take 1 tablet (15 mg) by mouth daily for 14 days 14 tablet 0          Review Of Systems  Skin: negative  Eyes: negative  Ears/Nose/Throat: negative  Respiratory: No shortness of breath, dyspnea on exertion, cough, or hemoptysis    O: Physical Exam:  Pain to palpation over left knee anteromedial meniscal margin but even more so deep to MCL but no exacerbation with valgus stress.  No tenderness lateral joint line.  No effusion, no synovial complex.  Full flexion and extension L knee.  No evidence instability.  CMS intact to LLE.    Lab:update inflammatory markers and arthritic profile    Images:XR KNEE LEFT 3 VIEWS 8/7/2024 8:31 AM      HISTORY: Knee pain, swelling     COMPARISON: None.                                                                          IMPRESSION: The left knee is made for fracture or compartmental  narrowing. No  effusion.         A:  Meniscal pathology L knee      P:  obtain inflammatory markers and arthritic profile  MRI L knee  See back after studies  Discussed RICE and crutches if needed  Would like to wait on work until after MRI which is reasonable.  Will notify if exacerbation symptoms.             In addition to the above assessment and plan each active problem on Claude's problem list was evaluated today. This included the questioning of Claude for any medication problems. We will continue the current treatment plan for these active problems except as noted.

## 2024-08-08 NOTE — PATIENT INSTRUCTIONS
Thank you for choosing [facility]!      FOLLOW-UP  Dr. Lanza would like you to follow-up in after MRI is done. Please stop by the  on your way out or call [phone] to schedule.       LAB WORK   Dr. Lanza would like you to have some lab work completed. You can go to the Specialty Lab on the upper level to have this completed following your appointment, or you can call [phone] to schedule a future appointment if you do not have time to complete this today.     Please call [phone] to schedule your MRI .     *Once your imaging exam has been scheduled, our prior authorization team will connect with your insurance to ensure coverage of the exam. They will only reach out to you if a prior authorization is denied.  Please schedule your imaging exam at least 7 days out so our team has time to complete this process.  Failure to do so could result in insurance denial and you becoming responsible for the cost of the exam.     After your imaging appointment is scheduled, call [phone] to schedule your an in-person follow-up appointment with Dr. Lanza to discuss your results. .

## 2024-08-09 LAB
ANA PAT SER IF-IMP: ABNORMAL
ANA SER QL IF: POSITIVE
ANA TITR SER IF: ABNORMAL {TITER}
RHEUMATOID FACT SERPL-ACNC: 44 IU/ML
T3FREE SERPL-MCNC: 3.5 PG/ML (ref 2–4.4)
VIT D+METAB SERPL-MCNC: 20 NG/ML (ref 20–50)

## 2024-08-12 LAB
CCP AB SER IA-ACNC: 0.8 U/ML
DSDNA AB SER-ACNC: <0.6 IU/ML

## 2024-08-22 ENCOUNTER — HOSPITAL ENCOUNTER (OUTPATIENT)
Dept: GENERAL RADIOLOGY | Facility: CLINIC | Age: 49
Discharge: HOME | End: 2024-08-22
Attending: ORTHOPAEDIC SURGERY
Payer: OTHER MISCELLANEOUS

## 2024-08-22 ENCOUNTER — HOSPITAL ENCOUNTER (OUTPATIENT)
Dept: MRI IMAGING | Facility: CLINIC | Age: 49
Discharge: HOME | End: 2024-08-22
Attending: ORTHOPAEDIC SURGERY
Payer: OTHER MISCELLANEOUS

## 2024-08-22 DIAGNOSIS — Z01.89 ENCOUNTER FOR IMAGING TO SCREEN FOR METAL PRIOR TO MRI: ICD-10-CM

## 2024-08-22 DIAGNOSIS — M25.562 ACUTE PAIN OF LEFT KNEE: ICD-10-CM

## 2024-08-22 PROCEDURE — 73721 MRI JNT OF LWR EXTRE W/O DYE: CPT | Mod: 26 | Performed by: RADIOLOGY

## 2024-08-22 PROCEDURE — 73721 MRI JNT OF LWR EXTRE W/O DYE: CPT | Mod: LT

## 2024-08-22 PROCEDURE — 70030 X-RAY EYE FOR FOREIGN BODY: CPT

## 2024-09-03 ENCOUNTER — OFFICE VISIT (OUTPATIENT)
Dept: ORTHOPEDICS | Facility: CLINIC | Age: 49
End: 2024-09-03
Payer: OTHER MISCELLANEOUS

## 2024-09-03 VITALS
HEIGHT: 70 IN | TEMPERATURE: 98.3 F | BODY MASS INDEX: 24.2 KG/M2 | DIASTOLIC BLOOD PRESSURE: 91 MMHG | WEIGHT: 169 LBS | SYSTOLIC BLOOD PRESSURE: 135 MMHG

## 2024-09-03 DIAGNOSIS — S83.242A ACUTE MEDIAL MENISCUS TEAR OF LEFT KNEE, INITIAL ENCOUNTER: Primary | ICD-10-CM

## 2024-09-03 DIAGNOSIS — M25.562 ACUTE PAIN OF LEFT KNEE: ICD-10-CM

## 2024-09-03 PROCEDURE — 99213 OFFICE O/P EST LOW 20 MIN: CPT | Performed by: ORTHOPAEDIC SURGERY

## 2024-09-03 RX ORDER — ALLOPURINOL 100 MG/1
100 TABLET ORAL DAILY
Qty: 42 TABLET | Refills: 0 | Status: SHIPPED | OUTPATIENT
Start: 2024-09-03

## 2024-09-03 RX ORDER — ALLOPURINOL 300 MG/1
300 TABLET ORAL DAILY
Qty: 90 TABLET | Refills: 3 | Status: SHIPPED | OUTPATIENT
Start: 2024-09-03

## 2024-09-03 NOTE — PROGRESS NOTES
Medium hinged knee brace given with instructions.     Lin Akins RN   [facility]

## 2024-09-03 NOTE — LETTER
September 3, 2024    To Whom It May Concern:    Claude Higgins was seen in our clinic today. He may return to work with the following: limited to light duty - No squatting, no kneeling until next follow up appointment with Dr. Lanza about 1 month.      Sincerely,          Germain Lanza

## 2024-09-03 NOTE — PATIENT INSTRUCTIONS
Thank you for choosing [facility]!      FOLLOW-UP  Dr. Lanza would like you to follow-up in 1 month. Please stop by the  on your way out or call [phone] to schedule.       VITAMIN D/CALCIUM SUPPLEMENTATION  Dr. Lanza has recommended that you take supplemental Vitamin D. You will take 5,000u of Vitamin D3 daily; this can be purchased over the counter at the pharmacy of your preference. You will also take 50,000u once per week, a prescription for this strength has been sent to your preferred pharmacy.     Take 500mg Calcium Citrate 3x daily - can find over the counter    Start Allopurinol    Rheumatology referral ordered        What is Vitamin D?  Vitamin D is a fat-soluble vitamin, which means it is absorbed along with fats in the diet and can be stored in the body s fatty tissue. One of the unique aspects of Vitamin D is that the body can synthesize it when exposed to sunlight, specifically UVB rays. This is why it s often nicknamed the  sunshine vitamin.     Besides sunlight, Vitamin D can be obtained from certain foods, which we ll discuss in more depth a bit later. These include fatty fish (like salmon, mackerel, and sardines), fish liver oils, and small amounts in beef liver, cheese, and egg yolks. Some foods and beverages, like milk, orange juice, and cereals, are often fortified with Vitamin D.    For people who have limited sun exposure or dietary intake (which is actually the majority of people), Vitamin D supplements can be an important source. This is especially relevant in regions with less sunlight during certain seasons or for individuals with dietary restrictions.    Why You Need Vitamin D  Vitamin D is a powerhouse for your body, and the lack of Vitamin D can cause major issues. Below we ll discuss a few of the ways this vitamin plays a role in your life.    Bone Health  Vitamin D plays a critical role in calcium absorption in the gut, which is essential  for maintaining strong and healthy bones. This is particularly important in preventing rickets in children and osteoporosis or osteomalacia in adults.    5 Best Vitamin D Foods  Ambia: Just a small piece can give you a good amount of your daily Vitamin D needs.  Egg Yolks: The yolks are a great source of Vitamin D.   Mushrooms: Plant source of Vitamin D.   Fortified Foods: Some foods like milk, orange juice, and cereals have extra Vitamin D added to them. These are called fortified foods and are a great way to boost your Vitamin D.  Tuna and Mackerel: Great sources of Vitamin D.    The 5 Best Vitamin D Foods and Supplements - Cancer Guide Research Foundation        What is calcium?  Calcium is a mineral that the body needs for good health. Calcium is found naturally in some foods and is added to others. It also is available as a nutrition supplement and is contained in some medicines like Tums .    Why does the body need calcium?  Calcium is the healthy bone mineral. About 99% of the calcium in the body is stored in the bones and teeth. It's the mineral that makes them hard and strong. The remaining 1% is needed for many activities that help keep the body functioning normally. Calcium helps blood vessels contract (narrow) and expand, makes muscles contract, helps send messages through the nervous system and helps glands secrete hormones.    Bones are constantly being remodeled every day, and calcium moves in and out of them. In children and adolescents, the body builds new bone faster than it breaks down old bone so total bone mass increases. This continues until about age 30, when new bone formation and old bone breakdown start occurring at about the same rate. In older adults, especially in post-menopausal women, bone is broken down at a faster rate than it's built. If calcium intake is too low, this can contribute to osteoporosis.    What are the best ways to get enough calcium?  The best way to get enough  calcium every day is to eat a variety of healthy foods from all the different food groups. Getting enough vitamin D every day from foods like enriched milk or from natural sunlight is important to help the body absorb and use calcium from food.    Here are some easy guidelines for selecting foods high in calcium:  Dairy products have the highest calcium content. Dairy products include milk, yogurt and cheese. A cup (8 ounces) of milk contains 300 mg of calcium. The calcium content is the same for skim, low fat and whole milk.  Dark green, leafy vegetables contain high amounts of calcium. Broccoli, kale and collards are all good sources of calcium, especially when eaten raw or lightly steamed. (Boiling vegetables can take out much of their mineral content.)  A serving of canned salmon or sardines has about 200 mg of calcium. It's found in the soft bones of the fish.  Cereal, pasta, breads and other food made with grains may add calcium to the diet. Look for cereals that are fortified with minerals, including calcium.  Besides cereal, calcium is sometimes added to fruit juices, soy and rice beverages and tofu. Read product labels to find out if a food item has added calcium.  The U.S. Department of Agriculture recommends that everyone aged 9 years and older eat three servings of foods from the dairy group per day.    1 serving of dairy equals:  1 cup (8 ounces) milk.  1 cup yogurt.  1.5 ounces of natural cheese (such as cheddar).  2 ounces of processed cheese (such as American)    National Institutes of Health. Calcium Dietary Supplement Fact Sheet (https://ods.od.nih.gov/factsheets/Calcium-HealthProfessional/) and Calcium Fact Sheet for Consumers (https://ods.od.nih.gov/factsheets/Calcium-Consumer/) Accessed 12/16/2020.  National Osteoporosis Foundation. Calcium and Vitamin D: What You Need to Know (http://nof.org/calcium) Accessed 12/16/2020.  [employer]. Calcium and osteoporosis.  (https://pubmed.ncbi.nlm.nih.gov/9787108/) Nutrition. 1997 Jul-Aug;13(7-8):664-86. doi: 10.1016/r9121-3414022-4002(26)29794-6. PMID: 1020662. Accessed 12/16/2020.  U.S. Preventive Services Task Force. Screening for osteoporosis: U.S. preventive services task force recommendation statement. (https://pubmed.ncbi.nlm.nih.gov/26784047/) Precious Intern Med. 2011 Mar 1;154(5):356-64. doi: 10.7326/0677-2273-196-3-872858792-67550. Epub 2011 Jan 17. PMID: 91689784. Accessed 12/16/2020.

## 2024-09-03 NOTE — LETTER
9/3/2024      Claude Higgins  [address] [facility] [address]      Dear Colleague,    Thank you for referring your patient, Claude Higgins, to the [facility]. Please see a copy of my visit note below.    Medium hinged knee brace given with instructions.     Lin Akins RN   [facility]       S:Continues to have L knee pain         Patient Active Problem List   Diagnosis     Hyperlipidemia LDL goal <160     Tobacco use disorder     Unilateral inguinal hernia without obstruction or gangrene, recurrence not specified     Alcohol use     GERD (gastroesophageal reflux disease)     Cannabis abuse     Seizure disorder (H)     Shoulder strain, right, subsequent encounter     hx open tib-fib fracture 2003     History of osteomyelitis 2003     Closed nondisplaced fracture of navicular bone of right foot, initial encounter     Equinus contracture of right ankle            Past Medical History:   Diagnosis Date     Alcohol use     Out-patient chemical dependancy treatment in 2003     GERD (gastroesophageal reflux disease)      Inguinal hernia 2015    left     Tobacco abuse             Past Surgical History:   Procedure Laterality Date     HERNIA REPAIR, INGUINAL RT/LT Right 1976     HERNIORRHAPHY INGUINAL Left 7/10/2015    Procedure: HERNIORRHAPHY INGUINAL;  Surgeon: Vicente Aleman MD;  Location: [facility]     SURGICAL HISTORY OF -  Right 7/28/2003-04    Tib/fib, ORIF, bone and skin grafting and arterial bypass     SURGICAL HISTORY OF -  Right 2008    Tib/fib, hardware removal.      TESTICLE SURGERY Right 1993    Torsion correction, and prev. undescended            Social History     Tobacco Use     Smoking status: Every Day     Current packs/day: 1.00     Average packs/day: 1 pack/day for 17.0 years (17.0 ttl pk-yrs)     Types: Cigarettes     Smokeless tobacco: Current     Types: Chew   Substance Use Topics     Alcohol use: Yes     Alcohol/week: 0.0  standard drinks of alcohol     Comment: daily            Family History   Problem Relation Age of Onset     Myocardial Infarction Father      Myocardial Infarction Maternal Grandfather      Myocardial Infarction Paternal Grandfather                Allergies   Allergen Reactions     No Known Allergies             Current Outpatient Medications   Medication Sig Dispense Refill     allopurinol (ZYLOPRIM) 100 MG tablet Take 1 tablet (100 mg) by mouth daily. Take 100mg (1 tab) once daily x7 days, 200mg (2 tabs) once daily x7 days, 300mg (3 tabs) once daily x 1 week 42 tablet 0     allopurinol (ZYLOPRIM) 300 MG tablet Take 1 tablet (300 mg) by mouth daily. 90 tablet 3     vitamin D3 (CHOLECALCIFEROL) 1.25 MG (73778 UT) capsule Take 1 capsule (50,000 Units) by mouth every 7 days. for the next 12 weeks. 12 capsule 0     diclofenac (VOLTAREN) 1 % topical gel Apply 4 g topically 4 times daily 100 g 0     ibuprofen (ADVIL/MOTRIN) 200 MG tablet Take 400-600 mg by mouth every 4 hours as needed for pain (Patient not taking: Reported on 8/8/2024)       lamoTRIgine (LAMICTAL) 25 MG tablet Take 4 tabs in the morning and 3 tabs in the evening 630 tablet 3     meloxicam (MOBIC) 15 MG tablet Take 1 tablet (15 mg) by mouth daily for 14 days 14 tablet 0          Review Of Systems  Skin: negative  Eyes: negative  Ears/Nose/Throat: negative  Respiratory: No shortness of breath, dyspnea on exertion, cough, or hemoptysis    O: Physical Exam:  L knee parapatellar pain to palpation, pain also along medial joint line.      Lab:  Uric Acid 7.4, Vit D 20, ESR 25, D dimer 0.89, RF 44, LESLEE 1:1280    Images:       MR left knee without contrast 8/22/2024 11:34 AM     Techniques: Multiplanar multisequence imaging of the left knee was  obtained without administration of intra-articular or intravenous  contrast using routine protocol.     History: Acute pain of left knee     Comparison: Left knee radiographs 8/7/2024     Findings:     MENISCI:  Medial  meniscus: Complex degenerative tearing of the medial meniscus  body with radial and horizontal components and meniscal flap in the  meniscotibial recess.  Lateral meniscus: Blunting of the free edge of posterior horn;  otherwise intact.     LIGAMENTS  Cruciate ligaments: Intact.  Medial supporting structures: Intact.  Lateral supporting structures: Intact.     EXTENSOR MECHANISM  Intact. Slightly T2 hyperintense appearance of suprapatellar fat pad  without convex margins; not meeting the criteria for quadriceps  impingement.     FLUID  Small joint effusion. No substantial Baker's cyst.     OSSEOUS and ARTICULAR STRUCTURES  Bones: Edema like signal in the medial tibial plateau and lesser  degree in the medial femoral condyle. Subcentimeter presumed  enchondroma in the proximal tibia metaphysis.     Patellofemoral compartment: Up to grade IV chondromalacia patella  particularly in the medial facet.     Lateral compartment: No high-grade hyaline cartilage disease.     Medial compartment: Possible high-grade cartilage fissuring in the  central weightbearing surface of lateral femoral condyle compatible  with grade IV chondromalacia (series 6 image 1).     ANCILLARY FINDINGS  None.                                                                      Impression:  1. Complex degenerative tearing of the medial meniscus body with flap  in the meniscotibial recess.     2. Edema like signal in the medial compartment greater on to the tibia  plateau.     3. Grade IV chondromalacia patella and high-grade cartilage fissuring  in the medial femoral condyle.     A: Medial meniscal tear with medial compartment arthrosis       P:  Hinged neoprene knee sleeve  Calcium citrate 500 mg po TID  Vit D3 5000 international unit(s) daily  Vit D3 50,000 international unit(s) weekly x 3 mos  Start allopurinol  See Rheumatology  See back 6 weeks   Notify if exacerbation symptoms             In addition to the above assessment and plan each  active problem on Claude's problem list was evaluated today. This included the questioning of Claude for any medication problems. We will continue the current treatment plan for these active problems except as noted.      Again, thank you for allowing me to participate in the care of your patient.        Sincerely,        Germain Lanza MD

## 2024-09-09 NOTE — PROGRESS NOTES
S:Continues to have L knee pain         Patient Active Problem List   Diagnosis    Hyperlipidemia LDL goal <160    Tobacco use disorder    Unilateral inguinal hernia without obstruction or gangrene, recurrence not specified    Alcohol use    GERD (gastroesophageal reflux disease)    Cannabis abuse    Seizure disorder (H)    Shoulder strain, right, subsequent encounter    hx open tib-fib fracture 2003    History of osteomyelitis 2003    Closed nondisplaced fracture of navicular bone of right foot, initial encounter    Equinus contracture of right ankle            Past Medical History:   Diagnosis Date    Alcohol use     Out-patient chemical dependancy treatment in 2003    GERD (gastroesophageal reflux disease)     Inguinal hernia 2015    left    Tobacco abuse             Past Surgical History:   Procedure Laterality Date    HERNIA REPAIR, INGUINAL RT/LT Right 1976    HERNIORRHAPHY INGUINAL Left 7/10/2015    Procedure: HERNIORRHAPHY INGUINAL;  Surgeon: Vicente Aleman MD;  Location: [facility]    SURGICAL HISTORY OF -  Right 7/28/2003-04    Tib/fib, ORIF, bone and skin grafting and arterial bypass    SURGICAL HISTORY OF -  Right 2008    Tib/fib, hardware removal.     TESTICLE SURGERY Right 1993    Torsion correction, and prev. undescended            Social History     Tobacco Use    Smoking status: Every Day     Current packs/day: 1.00     Average packs/day: 1 pack/day for 17.0 years (17.0 ttl pk-yrs)     Types: Cigarettes    Smokeless tobacco: Current     Types: Chew   Substance Use Topics    Alcohol use: Yes     Alcohol/week: 0.0 standard drinks of alcohol     Comment: daily            Family History   Problem Relation Age of Onset    Myocardial Infarction Father     Myocardial Infarction Maternal Grandfather     Myocardial Infarction Paternal Grandfather                Allergies   Allergen Reactions    No Known Allergies             Current Outpatient Medications   Medication Sig Dispense Refill    allopurinol  (ZYLOPRIM) 100 MG tablet Take 1 tablet (100 mg) by mouth daily. Take 100mg (1 tab) once daily x7 days, 200mg (2 tabs) once daily x7 days, 300mg (3 tabs) once daily x 1 week 42 tablet 0    allopurinol (ZYLOPRIM) 300 MG tablet Take 1 tablet (300 mg) by mouth daily. 90 tablet 3    vitamin D3 (CHOLECALCIFEROL) 1.25 MG (04465 UT) capsule Take 1 capsule (50,000 Units) by mouth every 7 days. for the next 12 weeks. 12 capsule 0    diclofenac (VOLTAREN) 1 % topical gel Apply 4 g topically 4 times daily 100 g 0    ibuprofen (ADVIL/MOTRIN) 200 MG tablet Take 400-600 mg by mouth every 4 hours as needed for pain (Patient not taking: Reported on 8/8/2024)      lamoTRIgine (LAMICTAL) 25 MG tablet Take 4 tabs in the morning and 3 tabs in the evening 630 tablet 3    meloxicam (MOBIC) 15 MG tablet Take 1 tablet (15 mg) by mouth daily for 14 days 14 tablet 0          Review Of Systems  Skin: negative  Eyes: negative  Ears/Nose/Throat: negative  Respiratory: No shortness of breath, dyspnea on exertion, cough, or hemoptysis    O: Physical Exam:  L knee parapatellar pain to palpation, pain also along medial joint line.      Lab:  Uric Acid 7.4, Vit D 20, ESR 25, D dimer 0.89, RF 44, LESLEE 1:1280    Images:       MR left knee without contrast 8/22/2024 11:34 AM     Techniques: Multiplanar multisequence imaging of the left knee was  obtained without administration of intra-articular or intravenous  contrast using routine protocol.     History: Acute pain of left knee     Comparison: Left knee radiographs 8/7/2024     Findings:     MENISCI:  Medial meniscus: Complex degenerative tearing of the medial meniscus  body with radial and horizontal components and meniscal flap in the  meniscotibial recess.  Lateral meniscus: Blunting of the free edge of posterior horn;  otherwise intact.     LIGAMENTS  Cruciate ligaments: Intact.  Medial supporting structures: Intact.  Lateral supporting structures: Intact.     EXTENSOR MECHANISM  Intact. Slightly  T2 hyperintense appearance of suprapatellar fat pad  without convex margins; not meeting the criteria for quadriceps  impingement.     FLUID  Small joint effusion. No substantial Baker's cyst.     OSSEOUS and ARTICULAR STRUCTURES  Bones: Edema like signal in the medial tibial plateau and lesser  degree in the medial femoral condyle. Subcentimeter presumed  enchondroma in the proximal tibia metaphysis.     Patellofemoral compartment: Up to grade IV chondromalacia patella  particularly in the medial facet.     Lateral compartment: No high-grade hyaline cartilage disease.     Medial compartment: Possible high-grade cartilage fissuring in the  central weightbearing surface of lateral femoral condyle compatible  with grade IV chondromalacia (series 6 image 1).     ANCILLARY FINDINGS  None.                                                                      Impression:  1. Complex degenerative tearing of the medial meniscus body with flap  in the meniscotibial recess.     2. Edema like signal in the medial compartment greater on to the tibia  plateau.     3. Grade IV chondromalacia patella and high-grade cartilage fissuring  in the medial femoral condyle.     A: Medial meniscal tear with medial compartment arthrosis       P:  Hinged neoprene knee sleeve  Calcium citrate 500 mg po TID  Vit D3 5000 international unit(s) daily  Vit D3 50,000 international unit(s) weekly x 3 mos  Start allopurinol  See Rheumatology  See back 6 weeks   Notify if exacerbation symptoms             In addition to the above assessment and plan each active problem on Claude's problem list was evaluated today. This included the questioning of Claude for any medication problems. We will continue the current treatment plan for these active problems except as noted.

## 2024-09-19 DIAGNOSIS — M25.562 ACUTE PAIN OF LEFT KNEE: ICD-10-CM

## 2024-09-19 DIAGNOSIS — S83.242A ACUTE MEDIAL MENISCUS TEAR OF LEFT KNEE, INITIAL ENCOUNTER: ICD-10-CM

## 2024-09-19 RX ORDER — ALLOPURINOL 100 MG/1
TABLET ORAL
Qty: 42 TABLET | Refills: 0 | OUTPATIENT
Start: 2024-09-19

## 2024-09-19 NOTE — TELEPHONE ENCOUNTER
REFILL REQUEST     Last Written Prescription Date:  9/3/24  Last Fill Quantity: 90,  # refills: 3   Last office visit with prescribing provider: 9/3/2024    Future Office Visit:  10/7/24    Refill declined as one year supply was sent in on 9/3/24    Reyna Wheatley RN   [facility]

## 2024-09-20 ENCOUNTER — TELEPHONE (OUTPATIENT)
Dept: NEUROLOGY | Facility: CLINIC | Age: 49
End: 2024-09-20

## 2024-09-20 DIAGNOSIS — G40.909 SEIZURE DISORDER (H): ICD-10-CM

## 2024-09-26 RX ORDER — LAMOTRIGINE 25 MG/1
TABLET ORAL
Qty: 210 TABLET | Refills: 0 | Status: SHIPPED | OUTPATIENT
Start: 2024-09-26 | End: 2024-10-31

## 2024-09-26 NOTE — TELEPHONE ENCOUNTER
LAMOTRIGINE 25MG TABS   Take 4 tabs in the morning and 3 tabs in the evening    Last Written Prescription Date:  7/7/23  Last Fill Quantity: 630,   # refills: 3  Last Office Visit : 7/7/23  Future Office visit:  One year  Lamotrigine level 7/1/22   30 day annie refill sent to the pharmacy - including instructions for patient to call the clinic and schedule an appointment.  Scheduling has been notified to contact the pt for appointment.

## 2024-10-07 ENCOUNTER — OFFICE VISIT (OUTPATIENT)
Dept: ORTHOPEDICS | Facility: CLINIC | Age: 49
End: 2024-10-07
Payer: COMMERCIAL

## 2024-10-07 VITALS
BODY MASS INDEX: 24.25 KG/M2 | TEMPERATURE: 97.7 F | WEIGHT: 169 LBS | DIASTOLIC BLOOD PRESSURE: 97 MMHG | SYSTOLIC BLOOD PRESSURE: 150 MMHG

## 2024-10-07 DIAGNOSIS — M17.12 ARTHRITIS OF LEFT KNEE: Primary | ICD-10-CM

## 2024-10-07 DIAGNOSIS — S83.242A ACUTE MEDIAL MENISCUS TEAR OF LEFT KNEE, INITIAL ENCOUNTER: ICD-10-CM

## 2024-10-07 PROCEDURE — 20610 DRAIN/INJ JOINT/BURSA W/O US: CPT | Mod: LT | Performed by: ORTHOPAEDIC SURGERY

## 2024-10-07 PROCEDURE — 99213 OFFICE O/P EST LOW 20 MIN: CPT | Mod: 25 | Performed by: ORTHOPAEDIC SURGERY

## 2024-10-07 RX ORDER — ALLOPURINOL 100 MG/1
100 TABLET ORAL DAILY
Qty: 42 TABLET | Refills: 0 | Status: SHIPPED | OUTPATIENT
Start: 2024-10-07

## 2024-10-07 RX ORDER — ALLOPURINOL 300 MG/1
300 TABLET ORAL DAILY
Qty: 90 TABLET | Refills: 3 | Status: SHIPPED | OUTPATIENT
Start: 2024-10-07

## 2024-10-07 NOTE — PROGRESS NOTES
Large Joint Injection/Arthocentesis    Date/Time: 10/7/2024 9:25 AM    Performed by: Germain Lanza MD  Authorized by: Germain Lanza MD    Indications:  Pain and osteoarthritis  Needle Size:  22 G  Guidance: landmark guided    Location:  Knee      Medications:  60 mg sodium hyaluronate 60 MG/3ML  Outcome:  Tolerated well, no immediate complications  Procedure discussed: discussed risks, benefits, and alternatives    Consent Given by:  Patient  Timeout: timeout called immediately prior to procedure    Prep: patient was prepped and draped in usual sterile fashion

## 2024-10-07 NOTE — LETTER
10/7/2024      Claude Higgins  [id number] [facility] [address]      Dear Colleague,    Thank you for referring your patient, Claude Higgins, to the [facility]. Please see a copy of my visit note below.    Large Joint Injection/Arthocentesis    Date/Time: 10/7/2024 9:25 AM    Performed by: Germain Lanza MD  Authorized by: Germain Lanza MD    Indications:  Pain and osteoarthritis  Needle Size:  22 G  Guidance: landmark guided    Location:  Knee      Medications:  60 mg sodium hyaluronate 60 MG/3ML  Outcome:  Tolerated well, no immediate complications  Procedure discussed: discussed risks, benefits, and alternatives    Consent Given by:  Patient  Timeout: timeout called immediately prior to procedure    Prep: patient was prepped and draped in usual sterile fashion          S:  Patient with continued left knee pain.  Taking allopurinol, taking Vit D3.  Drinks alcohol daily.         Patient Active Problem List   Diagnosis     Hyperlipidemia LDL goal <160     Tobacco use disorder     Unilateral inguinal hernia without obstruction or gangrene, recurrence not specified     Alcohol use     GERD (gastroesophageal reflux disease)     Cannabis abuse     Seizure disorder (H)     Shoulder strain, right, subsequent encounter     hx open tib-fib fracture 2003     History of osteomyelitis 2003     Closed nondisplaced fracture of navicular bone of right foot, initial encounter     Equinus contracture of right ankle            Past Medical History:   Diagnosis Date     Alcohol use     Out-patient chemical dependancy treatment in 2003     GERD (gastroesophageal reflux disease)      Inguinal hernia 2015    left     Tobacco abuse             Past Surgical History:   Procedure Laterality Date     HERNIA REPAIR, INGUINAL RT/LT Right 1976     HERNIORRHAPHY INGUINAL Left 7/10/2015    Procedure: HERNIORRHAPHY INGUINAL;  Surgeon: Vicente Aleman MD;  Location:  OR     SURGICAL HISTORY OF -   Right 7/28/2003-04    Tib/fib, ORIF, bone and skin grafting and arterial bypass     SURGICAL HISTORY OF -  Right 2008    Tib/fib, hardware removal.      TESTICLE SURGERY Right 1993    Torsion correction, and prev. undescended            Social History     Tobacco Use     Smoking status: Every Day     Current packs/day: 1.00     Average packs/day: 1 pack/day for 17.0 years (17.0 ttl pk-yrs)     Types: Cigarettes     Smokeless tobacco: Current     Types: Chew   Substance Use Topics     Alcohol use: Yes     Alcohol/week: 0.0 standard drinks of alcohol     Comment: daily            Family History   Problem Relation Age of Onset     Myocardial Infarction Father      Myocardial Infarction Maternal Grandfather      Myocardial Infarction Paternal Grandfather                Allergies   Allergen Reactions     No Known Allergies             Current Outpatient Medications   Medication Sig Dispense Refill     allopurinol (ZYLOPRIM) 100 MG tablet Take 1 tablet (100 mg) by mouth daily. Take 100mg (1 tab) once daily x7 days, 200mg (2 tabs) once daily x7 days, 300mg (3 tabs) once daily x 1 week 42 tablet 0     allopurinol (ZYLOPRIM) 100 MG tablet Take 1 tablet (100 mg) by mouth daily. Take 100mg (1 tab) once daily x7 days, 200mg (2 tabs) once daily x7 days, 300mg (3 tabs) once daily x 1 week 42 tablet 0     allopurinol (ZYLOPRIM) 300 MG tablet Take 1 tablet (300 mg) by mouth daily. 90 tablet 3     allopurinol (ZYLOPRIM) 300 MG tablet Take 1 tablet (300 mg) by mouth daily. 90 tablet 3     diclofenac (VOLTAREN) 1 % topical gel Apply 4 g topically 4 times daily 100 g 0     lamoTRIgine (LAMICTAL) 25 MG tablet TAKE 4 TABLETS BY MOUTH EVERY MORNING AND 3 TABLETS EVERY EVENING.*PLEASE SCHEDULE APPT. FOR REFILLS [phone]* 210 tablet 0     vitamin D3 (CHOLECALCIFEROL) 1.25 MG (93508 UT) capsule Take 1 capsule (50,000 Units) by mouth every 7 days. for the next 12 weeks. 12 capsule 0     ibuprofen (ADVIL/MOTRIN) 200 MG tablet Take 400-600  mg by mouth every 4 hours as needed for pain (Patient not taking: Reported on 8/8/2024)       meloxicam (MOBIC) 15 MG tablet Take 1 tablet (15 mg) by mouth daily for 14 days 14 tablet 0          Review Of Systems  Skin: negative  Eyes: negative  Ears/Nose/Throat: negative  Respiratory: No shortness of breath, dyspnea on exertion, cough, or hemoptysis    O: Physical Exam:  Pain to palpation median parapatellar border, medial joint line.  Adequate knee flexion and extension.  Small effusion.  CMS intact.    Lab:Vit D 20, ESR 25, d dimer 0.89, uric acid 7.4, RF 44, LESLEE speckled 1:1280    Images:  Narrative & Impression   XR KNEE LEFT 3 VIEWS 8/7/2024 8:31 AM      HISTORY: Knee pain, swelling     COMPARISON: None.                                                                          IMPRESSION: The left knee is made for fracture or compartmental  narrowing. No effusion.   MR left knee without contrast 8/22/2024 11:34 AM     Techniques: Multiplanar multisequence imaging of the left knee was  obtained without administration of intra-articular or intravenous  contrast using routine protocol.     History: Acute pain of left knee     Comparison: Left knee radiographs 8/7/2024     Findings:     MENISCI:  Medial meniscus: Complex degenerative tearing of the medial meniscus  body with radial and horizontal components and meniscal flap in the  meniscotibial recess.  Lateral meniscus: Blunting of the free edge of posterior horn;  otherwise intact.     LIGAMENTS  Cruciate ligaments: Intact.  Medial supporting structures: Intact.  Lateral supporting structures: Intact.     EXTENSOR MECHANISM  Intact. Slightly T2 hyperintense appearance of suprapatellar fat pad  without convex margins; not meeting the criteria for quadriceps  impingement.     FLUID  Small joint effusion. No substantial Baker's cyst.     OSSEOUS and ARTICULAR STRUCTURES  Bones: Edema like signal in the medial tibial plateau and lesser  degree in the medial femoral  condyle. Subcentimeter presumed  enchondroma in the proximal tibia metaphysis.     Patellofemoral compartment: Up to grade IV chondromalacia patella  particularly in the medial facet.     Lateral compartment: No high-grade hyaline cartilage disease.     Medial compartment: Possible high-grade cartilage fissuring in the  central weightbearing surface of lateral femoral condyle compatible  with grade IV chondromalacia (series 6 image 1).     ANCILLARY FINDINGS  None.                                                                      Impression:  1. Complex degenerative tearing of the medial meniscus body with flap  in the meniscotibial recess.     2. Edema like signal in the medial compartment greater on to the tibia  plateau.     3. Grade IV chondromalacia patella and high-grade cartilage fissuring  in the medial femoral condyle.          A:  Complex degenerative tearing of the medial meniscus body with flap  in the meniscotibial recess.     2. Edema like signal in the medial compartment greater on to the tibia  plateau.     3. Grade IV chondromalacia patella and high-grade cartilage fissuring  in the medial femoral condyle.   Elevated uric acid and LESLEE, possible inflammatory arthropathy  Hypovitaminosis D    P:  50,000 international unit(s) D3 Weekly x 3 mos  5000 international unit(s) D3 daily  Allopurinol 300 mg daily and recheck labs 3 mos  See Rheumatology:  appt January 25.  Viscosupplementation L knee after verbal and written consent/ethyl chloride/chloraprep  Follow outcomes  Notify if exacerbation symptoms  Hinged knee sleeve as needed  Avoid squats/lunges/ladders  See back 2 mos             In addition to the above assessment and plan each active problem on Claude's problem list was evaluated today. This included the questioning of Claude for any medication problems. We will continue the current treatment plan for these active problems except as noted.        Again, thank you for allowing me to participate  in the care of your patient.        Sincerely,        Germain Lanza MD

## 2024-10-07 NOTE — PROGRESS NOTES
S:  Patient with continued left knee pain.  Taking allopurinol, taking Vit D3.  Drinks alcohol daily.         Patient Active Problem List   Diagnosis    Hyperlipidemia LDL goal <160    Tobacco use disorder    Unilateral inguinal hernia without obstruction or gangrene, recurrence not specified    Alcohol use    GERD (gastroesophageal reflux disease)    Cannabis abuse    Seizure disorder (H)    Shoulder strain, right, subsequent encounter    hx open tib-fib fracture 2003    History of osteomyelitis 2003    Closed nondisplaced fracture of navicular bone of right foot, initial encounter    Equinus contracture of right ankle            Past Medical History:   Diagnosis Date    Alcohol use     Out-patient chemical dependancy treatment in 2003    GERD (gastroesophageal reflux disease)     Inguinal hernia 2015    left    Tobacco abuse             Past Surgical History:   Procedure Laterality Date    HERNIA REPAIR, INGUINAL RT/LT Right 1976    HERNIORRHAPHY INGUINAL Left 7/10/2015    Procedure: HERNIORRHAPHY INGUINAL;  Surgeon: Vicente Aleman MD;  Location: [facility]    SURGICAL HISTORY OF -  Right 7/28/2003-04    Tib/fib, ORIF, bone and skin grafting and arterial bypass    SURGICAL HISTORY OF -  Right 2008    Tib/fib, hardware removal.     TESTICLE SURGERY Right 1993    Torsion correction, and prev. undescended            Social History     Tobacco Use    Smoking status: Every Day     Current packs/day: 1.00     Average packs/day: 1 pack/day for 17.0 years (17.0 ttl pk-yrs)     Types: Cigarettes    Smokeless tobacco: Current     Types: Chew   Substance Use Topics    Alcohol use: Yes     Alcohol/week: 0.0 standard drinks of alcohol     Comment: daily            Family History   Problem Relation Age of Onset    Myocardial Infarction Father     Myocardial Infarction Maternal Grandfather     Myocardial Infarction Paternal Grandfather                Allergies   Allergen Reactions    No Known Allergies             Current  Outpatient Medications   Medication Sig Dispense Refill    allopurinol (ZYLOPRIM) 100 MG tablet Take 1 tablet (100 mg) by mouth daily. Take 100mg (1 tab) once daily x7 days, 200mg (2 tabs) once daily x7 days, 300mg (3 tabs) once daily x 1 week 42 tablet 0    allopurinol (ZYLOPRIM) 100 MG tablet Take 1 tablet (100 mg) by mouth daily. Take 100mg (1 tab) once daily x7 days, 200mg (2 tabs) once daily x7 days, 300mg (3 tabs) once daily x 1 week 42 tablet 0    allopurinol (ZYLOPRIM) 300 MG tablet Take 1 tablet (300 mg) by mouth daily. 90 tablet 3    allopurinol (ZYLOPRIM) 300 MG tablet Take 1 tablet (300 mg) by mouth daily. 90 tablet 3    diclofenac (VOLTAREN) 1 % topical gel Apply 4 g topically 4 times daily 100 g 0    lamoTRIgine (LAMICTAL) 25 MG tablet TAKE 4 TABLETS BY MOUTH EVERY MORNING AND 3 TABLETS EVERY EVENING.*PLEASE SCHEDULE APPT. FOR REFILLS [phone]* 210 tablet 0    vitamin D3 (CHOLECALCIFEROL) 1.25 MG (16186 UT) capsule Take 1 capsule (50,000 Units) by mouth every 7 days. for the next 12 weeks. 12 capsule 0    ibuprofen (ADVIL/MOTRIN) 200 MG tablet Take 400-600 mg by mouth every 4 hours as needed for pain (Patient not taking: Reported on 8/8/2024)      meloxicam (MOBIC) 15 MG tablet Take 1 tablet (15 mg) by mouth daily for 14 days 14 tablet 0          Review Of Systems  Skin: negative  Eyes: negative  Ears/Nose/Throat: negative  Respiratory: No shortness of breath, dyspnea on exertion, cough, or hemoptysis    O: Physical Exam:  Pain to palpation median parapatellar border, medial joint line.  Adequate knee flexion and extension.  Small effusion.  CMS intact.    Lab:Vit D 20, ESR 25, d dimer 0.89, uric acid 7.4, RF 44, LESLEE speckled 1:1280    Images:  Narrative & Impression   XR KNEE LEFT 3 VIEWS 8/7/2024 8:31 AM      HISTORY: Knee pain, swelling     COMPARISON: None.                                                                          IMPRESSION: The left knee is made for fracture or  compartmental  narrowing. No effusion.   MR left knee without contrast 8/22/2024 11:34 AM     Techniques: Multiplanar multisequence imaging of the left knee was  obtained without administration of intra-articular or intravenous  contrast using routine protocol.     History: Acute pain of left knee     Comparison: Left knee radiographs 8/7/2024     Findings:     MENISCI:  Medial meniscus: Complex degenerative tearing of the medial meniscus  body with radial and horizontal components and meniscal flap in the  meniscotibial recess.  Lateral meniscus: Blunting of the free edge of posterior horn;  otherwise intact.     LIGAMENTS  Cruciate ligaments: Intact.  Medial supporting structures: Intact.  Lateral supporting structures: Intact.     EXTENSOR MECHANISM  Intact. Slightly T2 hyperintense appearance of suprapatellar fat pad  without convex margins; not meeting the criteria for quadriceps  impingement.     FLUID  Small joint effusion. No substantial Baker's cyst.     OSSEOUS and ARTICULAR STRUCTURES  Bones: Edema like signal in the medial tibial plateau and lesser  degree in the medial femoral condyle. Subcentimeter presumed  enchondroma in the proximal tibia metaphysis.     Patellofemoral compartment: Up to grade IV chondromalacia patella  particularly in the medial facet.     Lateral compartment: No high-grade hyaline cartilage disease.     Medial compartment: Possible high-grade cartilage fissuring in the  central weightbearing surface of lateral femoral condyle compatible  with grade IV chondromalacia (series 6 image 1).     ANCILLARY FINDINGS  None.                                                                      Impression:  1. Complex degenerative tearing of the medial meniscus body with flap  in the meniscotibial recess.     2. Edema like signal in the medial compartment greater on to the tibia  plateau.     3. Grade IV chondromalacia patella and high-grade cartilage fissuring  in the medial femoral condyle.           A:  Complex degenerative tearing of the medial meniscus body with flap  in the meniscotibial recess.     2. Edema like signal in the medial compartment greater on to the tibia  plateau.     3. Grade IV chondromalacia patella and high-grade cartilage fissuring  in the medial femoral condyle.   Elevated uric acid and LESLEE, possible inflammatory arthropathy  Hypovitaminosis D    P:  50,000 international unit(s) D3 Weekly x 3 mos  5000 international unit(s) D3 daily  Allopurinol 300 mg daily and recheck labs 3 mos  See Rheumatology:  appt January 25.  Viscosupplementation L knee after verbal and written consent/ethyl chloride/chloraprep  Follow outcomes  Notify if exacerbation symptoms  Hinged knee sleeve as needed  Avoid squats/lunges/ladders  See back 2 mos             In addition to the above assessment and plan each active problem on Claude's problem list was evaluated today. This included the questioning of Claude for any medication problems. We will continue the current treatment plan for these active problems except as noted.

## 2024-10-07 NOTE — PATIENT INSTRUCTIONS
Thank you for choosing [facility]!      FOLLOW-UP  Dr. Lanza would like you to follow-up in 2-3 months. Please stop by the  on your way out or call [phone] to schedule.       VITAMIN D SUPPLEMENTATION  Dr. Lanza has recommended that you take supplemental Vitamin D. You will take 5,000u of Vitamin D3 daily; this can be purchased over the counter at the pharmacy of your preference. You will also take 50,000u once per week, a prescription for this strength has been sent to your preferred pharmacy.     CARE AFTER YOUR INJECTION  Today you had a steroid injection of your left knee.  *Please do not soak in a hot tub, bath, or swimming pool for the next 48 hours  *It is ok to shower  *Ice today and only do your normal amounts of activity  *The lidocaine (what is giving you pain relief right now) will likely stop working in 1-2 hours.  You will then have pain again, similar to before you received the injection. The corticosteroid will not start working until approximately 1-2 weeks from now.  *In a small percentage of people, cortisone can cause flushing/redness in the face. This usually lasts for 1-3 days and resolves. Cool compress, Ibuprofen/Tylenol can help if this happens.    **Continue Allopurinol 300mg daily       What is Vitamin D?  Vitamin D is a fat-soluble vitamin, which means it is absorbed along with fats in the diet and can be stored in the body s fatty tissue. One of the unique aspects of Vitamin D is that the body can synthesize it when exposed to sunlight, specifically UVB rays. This is why it s often nicknamed the  sunshine vitamin.     Besides sunlight, Vitamin D can be obtained from certain foods, which we ll discuss in more depth a bit later. These include fatty fish (like salmon, mackerel, and sardines), fish liver oils, and small amounts in beef liver, cheese, and egg yolks. Some foods and beverages, like milk, orange juice, and cereals, are often  fortified with Vitamin D.    For people who have limited sun exposure or dietary intake (which is actually the majority of people), Vitamin D supplements can be an important source. This is especially relevant in regions with less sunlight during certain seasons or for individuals with dietary restrictions.    Why You Need Vitamin D  Vitamin D is a powerhouse for your body, and the lack of Vitamin D can cause major issues. Below we ll discuss a few of the ways this vitamin plays a role in your life.    Bone Health  Vitamin D plays a critical role in calcium absorption in the gut, which is essential for maintaining strong and healthy bones. This is particularly important in preventing rickets in children and osteoporosis or osteomalacia in adults.    5 Best Vitamin D Foods  [place]: Just a small piece can give you a good amount of your daily Vitamin D needs.  Egg Yolks: The yolks are a great source of Vitamin D.   Mushrooms: Plant source of Vitamin D.   Fortified Foods: Some foods like milk, orange juice, and cereals have extra Vitamin D added to them. These are called fortified foods and are a great way to boost your Vitamin D.  Tuna and Mackerel: Great sources of Vitamin D.    The 5 Best Vitamin D Foods and Supplements - [employer]

## 2024-10-19 ENCOUNTER — HEALTH MAINTENANCE LETTER (OUTPATIENT)
Age: 49
End: 2024-10-19

## 2024-10-26 DIAGNOSIS — G40.909 SEIZURE DISORDER (H): ICD-10-CM

## 2024-10-30 NOTE — TELEPHONE ENCOUNTER
lamoTRIgine (LAMICTAL) 25 MG tablet   Disp-210 tablet, R-0   Start: 09/26/2024 7/7/2023  [facility] Lorenza Padron MD  Neurology    Filling per [facility] medication refill protocols - seizure medications.  Not all labs required.    Nv:none    Scheduling has been notified to contact the pt for appointment.    Continue lamotrigine 100-75 mg per day     RTC in 1 year.

## 2024-10-31 RX ORDER — LAMOTRIGINE 25 MG/1
TABLET ORAL
Qty: 210 TABLET | Refills: 5 | Status: SHIPPED | OUTPATIENT
Start: 2024-10-31 | End: 2024-11-01

## 2024-10-31 NOTE — TELEPHONE ENCOUNTER
Called and left message for patient to call back. Want to schedule follow up with Dr. Fischer.

## 2024-11-01 ENCOUNTER — OFFICE VISIT (OUTPATIENT)
Dept: NEUROLOGY | Facility: CLINIC | Age: 49
End: 2024-11-01
Payer: COMMERCIAL

## 2024-11-01 VITALS
DIASTOLIC BLOOD PRESSURE: 87 MMHG | HEIGHT: 71 IN | HEART RATE: 80 BPM | SYSTOLIC BLOOD PRESSURE: 131 MMHG | WEIGHT: 165.7 LBS | BODY MASS INDEX: 23.2 KG/M2

## 2024-11-01 DIAGNOSIS — G40.909 SEIZURE DISORDER (H): ICD-10-CM

## 2024-11-01 DIAGNOSIS — R56.9 SEIZURES (H): Primary | ICD-10-CM

## 2024-11-01 PROCEDURE — G2211 COMPLEX E/M VISIT ADD ON: HCPCS | Performed by: PSYCHIATRY & NEUROLOGY

## 2024-11-01 PROCEDURE — 99213 OFFICE O/P EST LOW 20 MIN: CPT | Performed by: PSYCHIATRY & NEUROLOGY

## 2024-11-01 PROCEDURE — 99000 SPECIMEN HANDLING OFFICE-LAB: CPT | Performed by: PSYCHIATRY & NEUROLOGY

## 2024-11-01 PROCEDURE — 36415 COLL VENOUS BLD VENIPUNCTURE: CPT | Performed by: PSYCHIATRY & NEUROLOGY

## 2024-11-01 PROCEDURE — 80175 DRUG SCREEN QUAN LAMOTRIGINE: CPT | Mod: 90 | Performed by: PSYCHIATRY & NEUROLOGY

## 2024-11-01 RX ORDER — LAMOTRIGINE 25 MG/1
TABLET ORAL
Qty: 630 TABLET | Refills: 3 | Status: SHIPPED | OUTPATIENT
Start: 2024-11-01 | End: 2024-11-12 | Stop reason: DRUGHIGH

## 2024-11-01 NOTE — LETTER
"2024      Claude Higgins  [id number] [address] [address]      Dear Colleague,      Thank you for referring your patient, Claude Higgins, to the [facility] [place]. Please see a copy of my visit note below.     NEUROLOGY PROGRESS NOTE   [facility]    Patient:Claude Higgins  : 1975  Age: 49 year old  Today's Office Visit: 2024    The patient's seizures started 2016.  He had 3 seizures in his life, the last one was 10/2016.  All his seizures were alcohol related.  He states he did binge drink the night before all his seizures.  He describes his seizure as no auras, and \"I just lose consciousness.\"  Witnesses have reported shaking all over, making noises, and stiffening up.  He had urinary incontinence with his last seizure, denies tongue biting, major injuries or car accidents.  He tried levetiracetam, but it caused slow thinking and tiredness. Then he was started on lamotrigine, which he tolerates well.   Has a h/o amphetamine use, sober since  and heavy alcohol drinking.     History of present illness:     Claude returns to the clinic for a follow up on his seizures. He was last seen 2023.  He did not have any seizures since last visit.  He is taking lamotrigine 100-75.  He denies side effects.      He cut back on his drinking, from 3-4 drinks daily to 2 drinks daily.  He has a history of anxiety.  He uses marijuana every night, he says it helps him sleep.  He does not want to take medication for anxiety or see a therapist.    No recent illnesses, ER visit or hospitalization.    Current Outpatient Medications   Medication Sig Dispense Refill    allopurinol (ZYLOPRIM) 300 MG tablet Take 1 tablet (300 mg) by mouth daily. 90 tablet 3    allopurinol (ZYLOPRIM) 300 MG tablet Take 1 tablet (300 mg) by mouth daily. 90 tablet 3    ibuprofen (ADVIL/MOTRIN) 200 MG tablet Take 400-600 mg by mouth every 4 hours as needed for pain.      " "lamoTRIgine (LAMICTAL) 25 MG tablet TAKE 4 TABLETS BY MOUTH IN THE MORNING AND 3 TABLETS IN THE EVENING. PLEASE SCHEDULE APPOINTMENT FOR REFILLS [phone] 210 tablet 5    vitamin D3 (CHOLECALCIFEROL) 1.25 MG (76503 UT) capsule Take 1 capsule (50,000 Units) by mouth every 7 days. for the next 12 weeks. 12 capsule 0    diclofenac (VOLTAREN) 1 % topical gel Apply 4 g topically 4 times daily (Patient not taking: Reported on 11/1/2024) 100 g 0    meloxicam (MOBIC) 15 MG tablet Take 1 tablet (15 mg) by mouth daily for 14 days 14 tablet 0       Exam:    /87   Pulse 80   Ht 5' 10.5\" (179.1 cm)   Wt 165 lb 11.2 oz (75.2 kg)   BMI 23.44 kg/m       Wt Readings from Last 5 Encounters:   11/01/24 165 lb 11.2 oz (75.2 kg)   10/07/24 169 lb (76.7 kg)   09/03/24 169 lb (76.7 kg)   08/08/24 169 lb (76.7 kg)   08/07/24 169 lb (76.7 kg)     General Appearance: Alert, awake, cooperative, pleasant, NAD  Gait: steady  Attention Span:  Normal  Language/speech: no aphasia or dysarthria  Extraocular Movements:  Normal  Coordination:  Normal  Facial Strength:  Normal  Motor Exam: normal tone, bulk and strength     Assessment/Plan:     1. Convulsions: Normal EEG and brain MRI. All his seizures were alcohol related. He decreased his drinking to 2 from 3-4 strong alcoholic drinks daily. Lamotrigine was increased to  due to subtherapeutic level. He wants to continue taking lamotrigine and doesn't feel safe coming off of it.       2. Anxiety: He does not want to take any medication or see a therapist.  He smokes marijuana every night to fall asleep. He was counseled against smoking marijuana, because in long term it may even worsen his anxiety and sleep.         - Continue lamotrigine 100-75 mg per day    - Obtain lamotrigine level     - RTC in 1 year.      As described above, I met with the patient for 15 minutes and during this time counseling was greater than 50% of the visit time.          Again, thank you for allowing me " to participate in the care of your patient.      Sincerely,    Lorenza Hinson MD

## 2024-11-01 NOTE — PROGRESS NOTES
" NEUROLOGY PROGRESS NOTE   [facility]    Patient:Claude Higgins  : 1975  Age: 49 year old  Today's Office Visit: 2024    The patient's seizures started 2016.  He had 3 seizures in his life, the last one was 10/2016.  All his seizures were alcohol related.  He states he did binge drink the night before all his seizures.  He describes his seizure as no auras, and \"I just lose consciousness.\"  Witnesses have reported shaking all over, making noises, and stiffening up.  He had urinary incontinence with his last seizure, denies tongue biting, major injuries or car accidents.  He tried levetiracetam, but it caused slow thinking and tiredness. Then he was started on lamotrigine, which he tolerates well.   Has a h/o amphetamine use, sober since  and heavy alcohol drinking.     History of present illness:     Cluade returns to the clinic for a follow up on his seizures. He was last seen 2023.  He did not have any seizures since last visit.  He is taking lamotrigine 100-75.  He denies side effects.      He cut back on his drinking, from 3-4 drinks daily to 2 drinks daily.  He has a history of anxiety.  He uses marijuana every night, he says it helps him sleep.  He does not want to take medication for anxiety or see a therapist.    No recent illnesses, ER visit or hospitalization.    Current Outpatient Medications   Medication Sig Dispense Refill    allopurinol (ZYLOPRIM) 300 MG tablet Take 1 tablet (300 mg) by mouth daily. 90 tablet 3    allopurinol (ZYLOPRIM) 300 MG tablet Take 1 tablet (300 mg) by mouth daily. 90 tablet 3    ibuprofen (ADVIL/MOTRIN) 200 MG tablet Take 400-600 mg by mouth every 4 hours as needed for pain.      lamoTRIgine (LAMICTAL) 25 MG tablet TAKE 4 TABLETS BY MOUTH IN THE MORNING AND 3 TABLETS IN THE EVENING. PLEASE SCHEDULE APPOINTMENT FOR REFILLS [phone] 210 tablet 5    vitamin D3 (CHOLECALCIFEROL) 1.25 MG (84790 UT) capsule Take 1 capsule (50,000 Units) by mouth " "every 7 days. for the next 12 weeks. 12 capsule 0    diclofenac (VOLTAREN) 1 % topical gel Apply 4 g topically 4 times daily (Patient not taking: Reported on 11/1/2024) 100 g 0    meloxicam (MOBIC) 15 MG tablet Take 1 tablet (15 mg) by mouth daily for 14 days 14 tablet 0       Exam:    /87   Pulse 80   Ht 5' 10.5\" (179.1 cm)   Wt 165 lb 11.2 oz (75.2 kg)   BMI 23.44 kg/m       Wt Readings from Last 5 Encounters:   11/01/24 165 lb 11.2 oz (75.2 kg)   10/07/24 169 lb (76.7 kg)   09/03/24 169 lb (76.7 kg)   08/08/24 169 lb (76.7 kg)   08/07/24 169 lb (76.7 kg)     General Appearance: Alert, awake, cooperative, pleasant, NAD  Gait: steady  Attention Span:  Normal  Language/speech: no aphasia or dysarthria  Extraocular Movements:  Normal  Coordination:  Normal  Facial Strength:  Normal  Motor Exam: normal tone, bulk and strength     Assessment/Plan:     1. Convulsions: Normal EEG and brain MRI. All his seizures were alcohol related. He decreased his drinking to 2 from 3-4 strong alcoholic drinks daily. Lamotrigine was increased to  due to subtherapeutic level. He wants to continue taking lamotrigine and doesn't feel safe coming off of it.       2. Anxiety: He does not want to take any medication or see a therapist.  He smokes marijuana every night to fall asleep. He was counseled against smoking marijuana, because in long term it may even worsen his anxiety and sleep.         - Continue lamotrigine 100-75 mg per day    - Obtain lamotrigine level     - RTC in 1 year.      As described above, I met with the patient for 15 minutes and during this time counseling was greater than 50% of the visit time.    Lorenza Hinson MD    "

## 2024-11-03 LAB — LAMOTRIGINE SERPL-MCNC: <0.9 UG/ML

## 2024-11-12 RX ORDER — LAMOTRIGINE 100 MG/1
100 TABLET ORAL 2 TIMES DAILY
Qty: 180 TABLET | Refills: 1 | Status: SHIPPED | OUTPATIENT
Start: 2024-11-12

## 2024-11-21 DIAGNOSIS — S83.242A ACUTE MEDIAL MENISCUS TEAR OF LEFT KNEE, INITIAL ENCOUNTER: ICD-10-CM

## 2024-11-21 DIAGNOSIS — M25.562 ACUTE PAIN OF LEFT KNEE: ICD-10-CM

## 2024-11-21 RX ORDER — CHOLECALCIFEROL (VITAMIN D3) 1250 MCG
CAPSULE ORAL
Qty: 12 CAPSULE | Refills: 0 | OUTPATIENT
Start: 2024-11-21

## 2024-11-21 NOTE — TELEPHONE ENCOUNTER
This medication was prescribed 9/3/2024 for 12 weeks. Therapy completed, this medication is no longer indicated after the 12 weeks are completed.     Lin Akins RN   [facility]

## 2025-01-29 ENCOUNTER — ANCILLARY PROCEDURE (OUTPATIENT)
Dept: GENERAL RADIOLOGY | Facility: CLINIC | Age: 50
End: 2025-01-29
Attending: PHYSICIAN ASSISTANT
Payer: COMMERCIAL

## 2025-01-29 ENCOUNTER — OFFICE VISIT (OUTPATIENT)
Dept: RHEUMATOLOGY | Facility: CLINIC | Age: 50
End: 2025-01-29
Payer: OTHER MISCELLANEOUS

## 2025-01-29 VITALS
BODY MASS INDEX: 23.91 KG/M2 | SYSTOLIC BLOOD PRESSURE: 119 MMHG | DIASTOLIC BLOOD PRESSURE: 82 MMHG | WEIGHT: 170.8 LBS | HEART RATE: 76 BPM | OXYGEN SATURATION: 99 % | RESPIRATION RATE: 16 BRPM | TEMPERATURE: 96.8 F | HEIGHT: 71 IN

## 2025-01-29 DIAGNOSIS — R76.8 ELEVATED RHEUMATOID FACTOR: ICD-10-CM

## 2025-01-29 DIAGNOSIS — R76.8 POSITIVE ANA (ANTINUCLEAR ANTIBODY): ICD-10-CM

## 2025-01-29 DIAGNOSIS — M25.50 POLYARTHRALGIA: ICD-10-CM

## 2025-01-29 DIAGNOSIS — M25.50 POLYARTHRALGIA: Primary | ICD-10-CM

## 2025-01-29 LAB
ALBUMIN MFR UR ELPH: 11.8 MG/DL
C3 SERPL-MCNC: 130 MG/DL (ref 81–157)
C4 SERPL-MCNC: 24 MG/DL (ref 13–39)
CREAT UR-MCNC: 216.5 MG/DL
CRP SERPL-MCNC: 3.49 MG/L
ERYTHROCYTE [SEDIMENTATION RATE] IN BLOOD BY WESTERGREN METHOD: 16 MM/HR (ref 0–20)
PROT/CREAT 24H UR: 0.05 MG/MG CR (ref 0–0.2)
RHEUMATOID FACT SERPL-ACNC: 42 IU/ML

## 2025-01-29 PROCEDURE — 86200 CCP ANTIBODY: CPT | Performed by: PHYSICIAN ASSISTANT

## 2025-01-29 PROCEDURE — 73080 X-RAY EXAM OF ELBOW: CPT | Mod: TC | Performed by: RADIOLOGY

## 2025-01-29 PROCEDURE — 73110 X-RAY EXAM OF WRIST: CPT | Mod: TC | Performed by: INTERNAL MEDICINE

## 2025-01-29 PROCEDURE — 86225 DNA ANTIBODY NATIVE: CPT | Performed by: PHYSICIAN ASSISTANT

## 2025-01-29 PROCEDURE — 85652 RBC SED RATE AUTOMATED: CPT | Performed by: PHYSICIAN ASSISTANT

## 2025-01-29 PROCEDURE — 86376 MICROSOMAL ANTIBODY EACH: CPT | Performed by: PHYSICIAN ASSISTANT

## 2025-01-29 PROCEDURE — 86140 C-REACTIVE PROTEIN: CPT | Performed by: PHYSICIAN ASSISTANT

## 2025-01-29 PROCEDURE — 73130 X-RAY EXAM OF HAND: CPT | Mod: TC | Performed by: INTERNAL MEDICINE

## 2025-01-29 PROCEDURE — 86039 ANTINUCLEAR ANTIBODIES (ANA): CPT | Performed by: PHYSICIAN ASSISTANT

## 2025-01-29 PROCEDURE — 86038 ANTINUCLEAR ANTIBODIES: CPT | Performed by: PHYSICIAN ASSISTANT

## 2025-01-29 PROCEDURE — 73030 X-RAY EXAM OF SHOULDER: CPT | Mod: TC | Performed by: INTERNAL MEDICINE

## 2025-01-29 PROCEDURE — 99204 OFFICE O/P NEW MOD 45 MIN: CPT | Performed by: PHYSICIAN ASSISTANT

## 2025-01-29 PROCEDURE — 36415 COLL VENOUS BLD VENIPUNCTURE: CPT | Performed by: PHYSICIAN ASSISTANT

## 2025-01-29 PROCEDURE — 84156 ASSAY OF PROTEIN URINE: CPT | Performed by: PHYSICIAN ASSISTANT

## 2025-01-29 PROCEDURE — 86431 RHEUMATOID FACTOR QUANT: CPT | Performed by: PHYSICIAN ASSISTANT

## 2025-01-29 PROCEDURE — 86160 COMPLEMENT ANTIGEN: CPT | Performed by: PHYSICIAN ASSISTANT

## 2025-01-29 ASSESSMENT — PAIN SCALES - GENERAL: PAINLEVEL_OUTOF10: MODERATE PAIN (5)

## 2025-01-29 NOTE — PATIENT INSTRUCTIONS
After Visit Instructions:     Thank you for coming to [facility] for your care. It is my goal to partner with you to help you reach your optimal state of health.       Plan:     Schedule follow-up with Perri Nguyen PA-C depending on results of today's work up  Imaging: xray hands, wrists, right elbow and right shoulder  Labs: LESLEE, dsDNA, complement levels, NADEEM panel, Urine (looking for protein), thyroid peroxidase antibody, Scl-70, Centromere antibody, CCP antibody, Rheumatoid factor, CRP and Sed Rate, HLA-B27      Perri Nguyen PA-C  [facility]  [facility]    Contact information: [facility]  Clinic Number:  [phone]  Please call or send a [employer] message with any questions about your care

## 2025-01-29 NOTE — PROGRESS NOTES
Rheumatology Clinic Visit  [facility]  TYLOR Galindo     Claude Higgins MRN# [id number]   YOB: 1975 Age: 50 year old   Date of Visit: 01/29/2025  Primary care provider: System, Provider Not In          Assessment and Plan:     1.  Polyarthralgia  2.  Positive LESLEE  3.  Elevated rheumatoid factor    Patient presents today for an initial evaluation.  He was seen in orthopedics due to ongoing knee pain.  Some autoimmune labs were checked and were found to be positive.  Due to those results it was recommended that he get evaluated with rheumatology.  He states that he has pain all over.  He states that he is been very hard on his body over the years and has broken multiple bones and has had multiple injuries.  His left knee has been what has been bothering him the most and he reports that he would like to get that fixed as the pain is quite bothersome for him.  Physical examination today did not show any active synovitis or dactylitis.  He has full fist formation and normal  strength.  No knee effusions present.  No enthesopathy or dactylitis.  Previous laboratory evaluation and imaging studies were reviewed, results below.    Reviewed the specialty of rheumatology today.  Discussed with the patient that a positive LESLEE is of unknown significance.  We discussed that the majority of positive ANAs are false positives.  10 to 30% of the healthy population can also have a positive LESLEE that is not associated with any disorder.  Thyroiditis and hepatitis have also been known to be associated with a positive LESLEE.  We also discussed that viral and bacterial infections could also have a positive LESLEE.  Discussed that 99% of people who have systemic lupus erythematous have a positive LESLEE but the majority of people that have a positive LESLEE do not have lupus.  An LESLEE can also be seen with other rheumatological autoimmune disorders such as scleroderma.  Reassured the patient that his symptoms are not  suggestive of a connective tissue disorder such as systemic lupus erythematous or scleroderma.  However given the positive LESLEE we will further check those LESLEE subsets.  He did have an elevated rheumatoid factor but states that he does smoke, approximately 1 pack/day.  We did review that that slight elevation in the rheumatoid factor could be secondary to smoking.  However smoking does also increase somebody's risk for developing rheumatoid arthritis.  Will recheck these labs and get imaging studies as well.  Symptoms however are much more suggestive of osteoarthritis.  Pain tends to be worse with activity and no significant swelling is noted.  I will contact the patient with the results of the evaluation once it is complete.    Plan:     Schedule follow-up with Perri Nguyen PA-C depending on results of today's work up  Imaging: xray hands, wrists, right elbow and right shoulder  Labs: LESLEE, dsDNA, complement levels, NADEEM panel, Urine (looking for protein), thyroid peroxidase antibody, Scl-70, Centromere antibody, CCP antibody, Rheumatoid factor, CRP and Sed Rate, HLA-B27    Perri Nguyen, PAC  Rheumatology         History of Present Illness:   Claude Higgins presents for evaluation of joint pain, positive LESLEE    He reports that he has issues with his left knee. Blood work was done and he was told that something else is going on. He was told that he may have something autoimmune going on. He has pain in his right shoulder. He states that he believes that it is from an injury that he never took care of. He states that both of shoulders have pain. He states that he has had 29 broken bones, that does not include toes and ribs. He has a history of bone and skin grafts. He states that he has had multiple stables and stitches in his life. No skin rashes. No mouth sores. No dry eyes or dry mouth. No history of pericarditis or pleural effusion. He had pleurisy many years go and he did not have to do anything for it. No  has a history of seizures, s/t alcohol. No history of blood clots. He has a lump on his left forearm that has been there for 1-2 years. He is taking Allopurinol for an elevated uric acid. No fatigue. Joint pain is worse after activity. No raynaud's. He may have some trouble with swallowing big pills. He will take those with yogurt and look down.     No known family history of lupus, RA, psoriasis, ulcerative colitis or crohn's. Father and brother with gout.          Review of Systems:     Constitutional: negative  Skin: negative  Eyes: negative  Ears/Nose/Throat: negative  Respiratory: No shortness of breath, dyspnea on exertion, cough, or hemoptysis  Cardiovascular: negative  Gastrointestinal: negative  Genitourinary: negative  Musculoskeletal: as above  Neurologic: negative  Psychiatric: negative  Hematologic/Lymphatic/Immunologic: negative  Endocrine: negative         Active Problem List:     Patient Active Problem List    Diagnosis Date Noted    hx open tib-fib fracture 2003 06/08/2021     Priority: Medium    History of osteomyelitis 2003 06/08/2021     Priority: Medium    Closed nondisplaced fracture of navicular bone of right foot, initial encounter 06/08/2021     Priority: Medium    Equinus contracture of right ankle 06/08/2021     Priority: Medium    Shoulder strain, right, subsequent encounter 08/09/2017     Priority: Medium    Cannabis abuse 10/07/2016     Priority: Medium    Seizure disorder (H) 10/07/2016     Priority: Medium    Tobacco use disorder 07/02/2015     Priority: Medium    Unilateral inguinal hernia without obstruction or gangrene, recurrence not specified 07/02/2015     Priority: Medium    Alcohol use      Priority: Medium    GERD (gastroesophageal reflux disease)      Priority: Medium    Hyperlipidemia LDL goal <160 06/29/2015     Priority: Medium            Past Medical History:     Past Medical History:   Diagnosis Date    Alcohol use     Out-patient chemical dependancy treatment in 2003     GERD (gastroesophageal reflux disease)     Inguinal hernia 2015    left    Tobacco abuse      Past Surgical History:   Procedure Laterality Date    HERNIA REPAIR, INGUINAL RT/LT Right 1976    HERNIORRHAPHY INGUINAL Left 7/10/2015    Procedure: HERNIORRHAPHY INGUINAL;  Surgeon: Vicente Aleman MD;  Location: [facility] OR    SURGICAL HISTORY OF -  Right 7/28/2003-04    Tib/fib, ORIF, bone and skin grafting and arterial bypass    SURGICAL HISTORY OF -  Right 2008    Tib/fib, hardware removal.     TESTICLE SURGERY Right 1993    Torsion correction, and prev. undescended            Social History:     Social History     Socioeconomic History    Marital status: Single     Spouse name: Not on file    Number of children: 0    Years of education: 12    Highest education level: Not on file   Occupational History    Occupation: Construction     Comment: [employer]   Tobacco Use    Smoking status: Every Day     Current packs/day: 1.00     Average packs/day: 1 pack/day for 17.0 years (17.0 ttl pk-yrs)     Types: Cigarettes    Smokeless tobacco: Current     Types: Chew   Substance and Sexual Activity    Alcohol use: Yes     Alcohol/week: 0.0 standard drinks of alcohol     Comment: daily    Drug use: Yes     Types: Marijuana     Comment: once a month    Sexual activity: Yes     Partners: Female   Other Topics Concern    Parent/sibling w/ CABG, MI or angioplasty before 65F 55M? No   Social History Narrative    Not on file     Social Drivers of Health     Financial Resource Strain: Not on file   Food Insecurity: Not on file   Transportation Needs: Not on file   Physical Activity: Not on file   Stress: Not on file   Social Connections: Not on file   Interpersonal Safety: Not on file   Housing Stability: Not on file          Family History:     Family History   Problem Relation Age of Onset    Myocardial Infarction Father     Myocardial Infarction Maternal Grandfather     Myocardial Infarction Paternal Grandfather             Allergies:  "    Allergies   Allergen Reactions    No Known Allergies             Medications:     Current Outpatient Medications   Medication Sig Dispense Refill    allopurinol (ZYLOPRIM) 300 MG tablet Take 1 tablet (300 mg) by mouth daily. 90 tablet 3    Boswellia-Glucosamine-Vit D (OSTEO BI-FLEX ONE PER DAY PO) Take by mouth daily.      Calcium Citrate-Vitamin D (CALCIUM + D PO) Take by mouth daily.      lamoTRIgine (LAMICTAL) 100 MG tablet Take 1 tablet (100 mg) by mouth 2 times daily. 180 tablet 1    Multiple Vitamins-Minerals (MULTIVITAMIN MEN 50+ PO) Take by mouth daily.      Turmeric-Ginger-Black Pepper 125-6-50 MG-MG-MCG CHEW Take 3 chew tab by mouth daily.      vitamin D3 (CHOLECALCIFEROL) 1.25 MG (73983 UT) capsule Take 1 capsule (50,000 Units) by mouth every 7 days. for the next 12 weeks. 12 capsule 0    diclofenac (VOLTAREN) 1 % topical gel Apply 4 g topically 4 times daily (Patient not taking: Reported on 11/1/2024) 100 g 0    ibuprofen (ADVIL/MOTRIN) 200 MG tablet Take 400-600 mg by mouth every 4 hours as needed for pain. (Patient not taking: Reported on 1/29/2025)              Physical Exam:   Blood pressure 119/82, pulse 76, temperature 96.8  F (36  C), temperature source Tympanic, resp. rate 16, height 1.791 m (5' 10.5\"), weight 77.5 kg (170 lb 12.8 oz), SpO2 99%.  Wt Readings from Last 6 Encounters:   12/06/24 74.8 kg (165 lb)   11/01/24 75.2 kg (165 lb 11.2 oz)   10/07/24 76.7 kg (169 lb)   09/03/24 76.7 kg (169 lb)   08/08/24 76.7 kg (169 lb)   08/07/24 76.7 kg (169 lb)     Constitutional: well-developed, appearing stated age; cooperative  Eyes: nl PERRLA, conjunctiva, sclera  ENT: nl external ears, nose, hearing, lips, teeth, gums, throat. No mucositis.   No mucous membrane lesions, normal saliva pool  Neck: no mass or thyroid enlargement  Resp: No shortness of breath with normal conversation  Lymph: no cervical, supraclavicular or epitrochlear nodes  MS: The TMJ, neck, shoulder, elbow, wrist, MCP/PIP/DIP, " spine,  knee, ankle, and foot MTP/IP joints were examined and found normal. No active synovitis or altered joint anatomy. Full joint ROM. Normal  strength. No dactylitis,  tenosynovitis, enthesopathy.   Skin: no nail pitting, alopecia, rash, nodules or lesions.   Psych: nl judgement, orientation, memory, affect.           Data:   Imaging:  MRI left knee 8/22/2024  Impression:  1. Complex degenerative tearing of the medial meniscus body with flap  in the meniscotibial recess.     2. Edema like signal in the medial compartment greater on to the tibia  plateau.     3. Grade IV chondromalacia patella and high-grade cartilage fissuring  in the medial femoral condyle.     Laboratory: 8/8/2024  CRP 4.16  CCP antibody 0.8  Rheumatoid Factor 44  Uric acid 7.4  Sed rate 25  LESLEE positive with a speckled pattern and a titer of 1: 1280  Double-stranded DNA negative     12/6/2024  Uric acid 5.3  LESLEE positive with a speckled pattern and a titer of 1: 640

## 2025-01-30 LAB
ANA PAT SER IF-IMP: ABNORMAL
ANA SER QL IF: POSITIVE
ANA TITR SER IF: ABNORMAL {TITER}
CCP AB SER IA-ACNC: 1.4 U/ML
DSDNA AB SER-ACNC: 0.7 IU/ML
THYROPEROXIDASE AB SERPL-ACNC: 15 IU/ML

## 2025-02-03 LAB
B LOCUS: NORMAL
B27TEST METHOD: NORMAL

## 2025-02-24 ENCOUNTER — OFFICE VISIT (OUTPATIENT)
Dept: ORTHOPEDICS | Facility: CLINIC | Age: 50
End: 2025-02-24
Payer: COMMERCIAL

## 2025-02-24 ENCOUNTER — ANCILLARY PROCEDURE (OUTPATIENT)
Dept: GENERAL RADIOLOGY | Facility: CLINIC | Age: 50
End: 2025-02-24
Attending: ORTHOPAEDIC SURGERY
Payer: COMMERCIAL

## 2025-02-24 ENCOUNTER — OFFICE VISIT (OUTPATIENT)
Dept: ORTHOPEDICS | Facility: CLINIC | Age: 50
End: 2025-02-24
Payer: OTHER MISCELLANEOUS

## 2025-02-24 VITALS — BODY MASS INDEX: 24.05 KG/M2 | WEIGHT: 170 LBS

## 2025-02-24 DIAGNOSIS — M17.12 ARTHRITIS OF LEFT KNEE: Primary | ICD-10-CM

## 2025-02-24 DIAGNOSIS — S99.921A INJURY OF RIGHT FOOT, INITIAL ENCOUNTER: ICD-10-CM

## 2025-02-24 DIAGNOSIS — E50.9: ICD-10-CM

## 2025-02-24 DIAGNOSIS — S99.921A INJURY OF RIGHT FOOT, INITIAL ENCOUNTER: Primary | ICD-10-CM

## 2025-02-24 PROCEDURE — 73630 X-RAY EXAM OF FOOT: CPT | Mod: TC | Performed by: RADIOLOGY

## 2025-02-24 PROCEDURE — 99213 OFFICE O/P EST LOW 20 MIN: CPT | Performed by: ORTHOPAEDIC SURGERY

## 2025-02-24 NOTE — PROGRESS NOTES
S:  Was stepping up on a step over the weekend and felt pain in R foot, unable to walk without crutches today.         Patient Active Problem List   Diagnosis    Hyperlipidemia LDL goal <160    Tobacco use disorder    Unilateral inguinal hernia without obstruction or gangrene, recurrence not specified    Alcohol use    GERD (gastroesophageal reflux disease)    Cannabis abuse    Seizure disorder (H)    Shoulder strain, right, subsequent encounter    hx open tib-fib fracture 2003    History of osteomyelitis 2003    Closed nondisplaced fracture of navicular bone of right foot, initial encounter    Equinus contracture of right ankle            Past Medical History:   Diagnosis Date    Alcohol use     Out-patient chemical dependancy treatment in 2003    GERD (gastroesophageal reflux disease)     Inguinal hernia 2015    left    Tobacco abuse             Past Surgical History:   Procedure Laterality Date    HERNIA REPAIR, INGUINAL RT/LT Right 1976    HERNIORRHAPHY INGUINAL Left 7/10/2015    Procedure: HERNIORRHAPHY INGUINAL;  Surgeon: Vicente Aleman MD;  Location: [facility]    SURGICAL HISTORY OF -  Right 7/28/2003-04    Tib/fib, ORIF, bone and skin grafting and arterial bypass    SURGICAL HISTORY OF -  Right 2008    Tib/fib, hardware removal.     TESTICLE SURGERY Right 1993    Torsion correction, and prev. undescended            Social History     Tobacco Use    Smoking status: Every Day     Current packs/day: 1.00     Average packs/day: 1 pack/day for 17.0 years (17.0 ttl pk-yrs)     Types: Cigarettes    Smokeless tobacco: Current     Types: Chew   Substance Use Topics    Alcohol use: Yes     Alcohol/week: 14.0 standard drinks of alcohol     Types: 14 Shots of liquor per week     Comment: 2 vodka drinks daily            Family History   Problem Relation Age of Onset    Myocardial Infarction Father     Myocardial Infarction Maternal Grandfather     Myocardial Infarction Paternal Grandfather                Allergies    Allergen Reactions    No Known Allergies             Current Outpatient Medications   Medication Sig Dispense Refill    allopurinol (ZYLOPRIM) 300 MG tablet Take 1 tablet (300 mg) by mouth daily. 90 tablet 3    Boswellia-Glucosamine-Vit D (OSTEO BI-FLEX ONE PER DAY PO) Take by mouth daily.      Calcium Citrate-Vitamin D (CALCIUM + D PO) Take by mouth daily.      diclofenac (VOLTAREN) 1 % topical gel Apply 4 g topically 4 times daily (Patient not taking: Reported on 2/24/2025) 100 g 0    ibuprofen (ADVIL/MOTRIN) 200 MG tablet Take 400-600 mg by mouth every 4 hours as needed for pain.      lamoTRIgine (LAMICTAL) 100 MG tablet Take 1 tablet (100 mg) by mouth 2 times daily. 180 tablet 1    Multiple Vitamins-Minerals (MULTIVITAMIN MEN 50+ PO) Take by mouth daily.      Turmeric-Ginger-Black Pepper 125-6-50 MG-MG-MCG CHEW Take 3 chew tab by mouth daily.      vitamin D3 (CHOLECALCIFEROL) 1.25 MG (34673 UT) capsule Take 1 capsule (50,000 Units) by mouth every 7 days. for the next 12 weeks. 12 capsule 0          Review Of Systems  Skin: negative  Eyes: negative  Ears/Nose/Throat: negative  Respiratory: No shortness of breath, dyspnea on exertion, cough, or hemoptysis    O: Physical Exam:  Pain to palpation R lateral cuboid.  CMS intact to toes.      Lab:  see knee visit    Images:  avulsion fx, probably acute on chronic R lateral cuboid with soft tissue swelling.       Narrative & Impression   FOOT RIGHT THREE OR MORE VIEWS  July 27, 2021 10:09 AM      HISTORY: Closed nondisplaced fracture of navicular bone of right foot,  initial encounter.     COMPARISON: Right foot x-rays dated 6/29/2021.                                                                      IMPRESSION:  1. No change in alignment of the dorsal intra-articular navicular  fracture. No definite callus formation is seen.  2. Irregularity of the distal fibula in tibia are again noted and  consistent with chronic healed injury.  3. Irregularity of the soft  tissues along the dorsal aspect of the  distal lower leg is partially imaged. This is similar to the prior  study.  4. No other changes.        A:  Avulsion fx R lateral cuboid acute on chronic      P:  stiffed soled shoe or cam boot (which he has at home) for R foot over next month  Notify if  exacerbation symptoms  See back one month, repeat images if continued problems             In addition to the above assessment and plan each active problem on Claude's problem list was evaluated today. This included the questioning of Claude for any medication problems. We will continue the current treatment plan for these active problems except as noted.

## 2025-02-24 NOTE — PATIENT INSTRUCTIONS
Thank you for choosing [facility]!      FOLLOW-UP  Dr. Lanza would like you to follow-up in after MRI. Please stop by the  on your way out or call [phone] to schedule.       IMAGING  Please call [phone] to schedule your MRI .     *Once your imaging exam has been scheduled, our prior authorization team will connect with your insurance to ensure coverage of the exam. They will only reach out to you if a prior authorization is denied.  Please schedule your imaging exam at least 7 days out so our team has time to complete this process.  Failure to do so could result in insurance denial and you becoming responsible for the cost of the exam.     After your imaging appointment is scheduled, call [phone] to schedule your an in-person follow-up appointment with Dr. Lanza to discuss your results. .

## 2025-02-24 NOTE — PROGRESS NOTES
S:  L knee continues to be bothersome, continues with hinged knee brace LLE.  Also has recently been evaluated by Rheumatology:  Assessment and Plan:      1.  Polyarthralgia  2.  Positive LESLEE  3.  Elevated rheumatoid factor     Patient presents today for an initial evaluation.  He was seen in orthopedics due to ongoing knee pain.  Some autoimmune labs were checked and were found to be positive.  Due to those results it was recommended that he get evaluated with rheumatology.  He states that he has pain all over.  He states that he is been very hard on his body over the years and has broken multiple bones and has had multiple injuries.  His left knee has been what has been bothering him the most and he reports that he would like to get that fixed as the pain is quite bothersome for him.  Physical examination today did not show any active synovitis or dactylitis.  He has full fist formation and normal  strength.  No knee effusions present.  No enthesopathy or dactylitis.  Previous laboratory evaluation and imaging studies were reviewed, results below.     Reviewed the specialty of rheumatology today.  Discussed with the patient that a positive LESLEE is of unknown significance.  We discussed that the majority of positive ANAs are false positives.  10 to 30% of the healthy population can also have a positive LESLEE that is not associated with any disorder.  Thyroiditis and hepatitis have also been known to be associated with a positive LESLEE.  We also discussed that viral and bacterial infections could also have a positive LESLEE.  Discussed that 99% of people who have systemic lupus erythematous have a positive LESLEE but the majority of people that have a positive LESLEE do not have lupus.  An LESLEE can also be seen with other rheumatological autoimmune disorders such as scleroderma.  Reassured the patient that his symptoms are not suggestive of a connective tissue disorder such as systemic lupus erythematous or scleroderma.   However given the positive LESLEE we will further check those LESLEE subsets.  He did have an elevated rheumatoid factor but states that he does smoke, approximately 1 pack/day.  We did review that that slight elevation in the rheumatoid factor could be secondary to smoking.  However smoking does also increase somebody's risk for developing rheumatoid arthritis.  Will recheck these labs and get imaging studies as well.  Symptoms however are much more suggestive of osteoarthritis.  Pain tends to be worse with activity and no significant swelling is noted.  I will contact the patient with the results of the evaluation once it is complete.     Plan:      Schedule follow-up with Perri Nguyen PA-C depending on results of today's work up  Imaging: xray hands, wrists, right elbow and right shoulder  Labs: LESLEE, dsDNA, complement levels, NADEEM panel, Urine (looking for protein), thyroid peroxidase antibody, Scl-70, Centromere antibody, CCP antibody, Rheumatoid factor, CRP and Sed Rate, HLA-B27              Patient Active Problem List   Diagnosis    Hyperlipidemia LDL goal <160    Tobacco use disorder    Unilateral inguinal hernia without obstruction or gangrene, recurrence not specified    Alcohol use    GERD (gastroesophageal reflux disease)    Cannabis abuse    Seizure disorder (H)    Shoulder strain, right, subsequent encounter    hx open tib-fib fracture 2003    History of osteomyelitis 2003    Closed nondisplaced fracture of navicular bone of right foot, initial encounter    Equinus contracture of right ankle            Past Medical History:   Diagnosis Date    Alcohol use     Out-patient chemical dependancy treatment in 2003    GERD (gastroesophageal reflux disease)     Inguinal hernia 2015    left    Tobacco abuse             Past Surgical History:   Procedure Laterality Date    HERNIA REPAIR, INGUINAL RT/LT Right 1976    HERNIORRHAPHY INGUINAL Left 7/10/2015    Procedure: HERNIORRHAPHY INGUINAL;  Surgeon: Love  MD Vicente;  Location: [facility]    SURGICAL HISTORY OF -  Right 7/28/2003-04    Tib/fib, ORIF, bone and skin grafting and arterial bypass    SURGICAL HISTORY OF -  Right 2008    Tib/fib, hardware removal.     TESTICLE SURGERY Right 1993    Torsion correction, and prev. undescended            Social History     Tobacco Use    Smoking status: Every Day     Current packs/day: 1.00     Average packs/day: 1 pack/day for 17.0 years (17.0 ttl pk-yrs)     Types: Cigarettes    Smokeless tobacco: Current     Types: Chew   Substance Use Topics    Alcohol use: Yes     Alcohol/week: 14.0 standard drinks of alcohol     Types: 14 Shots of liquor per week     Comment: 2 vodka drinks daily            Family History   Problem Relation Age of Onset    Myocardial Infarction Father     Myocardial Infarction Maternal Grandfather     Myocardial Infarction Paternal Grandfather                Allergies   Allergen Reactions    No Known Allergies             Current Outpatient Medications   Medication Sig Dispense Refill    allopurinol (ZYLOPRIM) 300 MG tablet Take 1 tablet (300 mg) by mouth daily. 90 tablet 3    Boswellia-Glucosamine-Vit D (OSTEO BI-FLEX ONE PER DAY PO) Take by mouth daily.      Calcium Citrate-Vitamin D (CALCIUM + D PO) Take by mouth daily.      diclofenac (VOLTAREN) 1 % topical gel Apply 4 g topically 4 times daily (Patient not taking: Reported on 11/1/2024) 100 g 0    ibuprofen (ADVIL/MOTRIN) 200 MG tablet Take 400-600 mg by mouth every 4 hours as needed for pain. (Patient not taking: Reported on 1/29/2025)      lamoTRIgine (LAMICTAL) 100 MG tablet Take 1 tablet (100 mg) by mouth 2 times daily. 180 tablet 1    Multiple Vitamins-Minerals (MULTIVITAMIN MEN 50+ PO) Take by mouth daily.      Turmeric-Ginger-Black Pepper 125-6-50 MG-MG-MCG CHEW Take 3 chew tab by mouth daily.      vitamin D3 (CHOLECALCIFEROL) 1.25 MG (92346 UT) capsule Take 1 capsule (50,000 Units) by mouth every 7 days. for the next 12 weeks. 12 capsule 0           Review Of Systems  Skin: negative  Eyes: negative  Ears/Nose/Throat: negative  Respiratory: No shortness of breath, dyspnea on exertion, cough, or hemoptysis    O: Physical Exam:  No change physical exam L knee, continues hinged knee brace.  CMS intact to LLE.    Lab:LESLEE 1:160 speckled, Uric Acid 5.3, vit D 63    Images:  Narrative & Impression   XR KNEE LEFT 3 VIEWS 8/7/2024 8:31 AM      HISTORY: Knee pain, swelling     COMPARISON: None.                                                                          IMPRESSION: The left knee is made for fracture or compartmental  narrowing. No effusion.   MR left knee without contrast 8/22/2024 11:34 AM     Techniques: Multiplanar multisequence imaging of the left knee was  obtained without administration of intra-articular or intravenous  contrast using routine protocol.     History: Acute pain of left knee     Comparison: Left knee radiographs 8/7/2024     Findings:     MENISCI:  Medial meniscus: Complex degenerative tearing of the medial meniscus  body with radial and horizontal components and meniscal flap in the  meniscotibial recess.  Lateral meniscus: Blunting of the free edge of posterior horn;  otherwise intact.     LIGAMENTS  Cruciate ligaments: Intact.  Medial supporting structures: Intact.  Lateral supporting structures: Intact.     EXTENSOR MECHANISM  Intact. Slightly T2 hyperintense appearance of suprapatellar fat pad  without convex margins; not meeting the criteria for quadriceps  impingement.     FLUID  Small joint effusion. No substantial Baker's cyst.     OSSEOUS and ARTICULAR STRUCTURES  Bones: Edema like signal in the medial tibial plateau and lesser  degree in the medial femoral condyle. Subcentimeter presumed  enchondroma in the proximal tibia metaphysis.     Patellofemoral compartment: Up to grade IV chondromalacia patella  particularly in the medial facet.     Lateral compartment: No high-grade hyaline cartilage disease.     Medial  compartment: Possible high-grade cartilage fissuring in the  central weightbearing surface of lateral femoral condyle compatible  with grade IV chondromalacia (series 6 image 1).     ANCILLARY FINDINGS  None.                                                                      Impression:  1. Complex degenerative tearing of the medial meniscus body with flap  in the meniscotibial recess.     2. Edema like signal in the medial compartment greater on to the tibia  plateau.     3. Grade IV chondromalacia patella and high-grade cartilage fissuring  in the medial femoral condyle.          A:  L knee arthrosis      P:  repeat MRI L knee to compare to study from 8/24  Discussed arthroscopy L knee but in view of possible inflammatory arthropathy and extent of bone pathology and arthropathy more than just meniscal pathology would like further comparison study to help determine whether such intervention might be helpful.  Continue with Rheumatology as well, also continue allopurinol with target uric acid less than 5.0  Notify if exacerbation symptoms  See back after MRI           In addition to the above assessment and plan each active problem on Claude's problem list was evaluated today. This included the questioning of Claude for any medication problems. We will continue the current treatment plan for these active problems except as noted.

## 2025-02-24 NOTE — LETTER
2/24/2025      Claude Higgins  [address] [facility] [address]      Dear Colleague,    Thank you for referring your patient, Claude Higgins, to the [facility]. Please see a copy of my visit note below.    S:  L knee continues to be bothersome, continues with hinged knee brace LLE.  Also has recently been evaluated by Rheumatology:  Assessment and Plan:      1.  Polyarthralgia  2.  Positive LESLEE  3.  Elevated rheumatoid factor     Patient presents today for an initial evaluation.  He was seen in orthopedics due to ongoing knee pain.  Some autoimmune labs were checked and were found to be positive.  Due to those results it was recommended that he get evaluated with rheumatology.  He states that he has pain all over.  He states that he is been very hard on his body over the years and has broken multiple bones and has had multiple injuries.  His left knee has been what has been bothering him the most and he reports that he would like to get that fixed as the pain is quite bothersome for him.  Physical examination today did not show any active synovitis or dactylitis.  He has full fist formation and normal  strength.  No knee effusions present.  No enthesopathy or dactylitis.  Previous laboratory evaluation and imaging studies were reviewed, results below.     Reviewed the specialty of rheumatology today.  Discussed with the patient that a positive LESLEE is of unknown significance.  We discussed that the majority of positive ANAs are false positives.  10 to 30% of the healthy population can also have a positive LESLEE that is not associated with any disorder.  Thyroiditis and hepatitis have also been known to be associated with a positive LESLEE.  We also discussed that viral and bacterial infections could also have a positive LESLEE.  Discussed that 99% of people who have systemic lupus erythematous have a positive LESLEE but the majority of people that have a positive LESLEE do not have  lupus.  An LESLEE can also be seen with other rheumatological autoimmune disorders such as scleroderma.  Reassured the patient that his symptoms are not suggestive of a connective tissue disorder such as systemic lupus erythematous or scleroderma.  However given the positive LESLEE we will further check those LESLEE subsets.  He did have an elevated rheumatoid factor but states that he does smoke, approximately 1 pack/day.  We did review that that slight elevation in the rheumatoid factor could be secondary to smoking.  However smoking does also increase somebody's risk for developing rheumatoid arthritis.  Will recheck these labs and get imaging studies as well.  Symptoms however are much more suggestive of osteoarthritis.  Pain tends to be worse with activity and no significant swelling is noted.  I will contact the patient with the results of the evaluation once it is complete.     Plan:      Schedule follow-up with Perri Nguyen PA-C depending on results of today's work up  Imaging: xray hands, wrists, right elbow and right shoulder  Labs: LESLEE, dsDNA, complement levels, NADEEM panel, Urine (looking for protein), thyroid peroxidase antibody, Scl-70, Centromere antibody, CCP antibody, Rheumatoid factor, CRP and Sed Rate, HLA-B27              Patient Active Problem List   Diagnosis     Hyperlipidemia LDL goal <160     Tobacco use disorder     Unilateral inguinal hernia without obstruction or gangrene, recurrence not specified     Alcohol use     GERD (gastroesophageal reflux disease)     Cannabis abuse     Seizure disorder (H)     Shoulder strain, right, subsequent encounter     hx open tib-fib fracture 2003     History of osteomyelitis 2003     Closed nondisplaced fracture of navicular bone of right foot, initial encounter     Equinus contracture of right ankle            Past Medical History:   Diagnosis Date     Alcohol use     Out-patient chemical dependancy treatment in 2003     GERD (gastroesophageal reflux disease)       Inguinal hernia 2015    left     Tobacco abuse             Past Surgical History:   Procedure Laterality Date     HERNIA REPAIR, INGUINAL RT/LT Right 1976     HERNIORRHAPHY INGUINAL Left 7/10/2015    Procedure: HERNIORRHAPHY INGUINAL;  Surgeon: Vicente Aleman MD;  Location: [facility] OR     SURGICAL HISTORY OF -  Right 7/28/2003-04    Tib/fib, ORIF, bone and skin grafting and arterial bypass     SURGICAL HISTORY OF -  Right 2008    Tib/fib, hardware removal.      TESTICLE SURGERY Right 1993    Torsion correction, and prev. undescended            Social History     Tobacco Use     Smoking status: Every Day     Current packs/day: 1.00     Average packs/day: 1 pack/day for 17.0 years (17.0 ttl pk-yrs)     Types: Cigarettes     Smokeless tobacco: Current     Types: Chew   Substance Use Topics     Alcohol use: Yes     Alcohol/week: 14.0 standard drinks of alcohol     Types: 14 Shots of liquor per week     Comment: 2 vodka drinks daily            Family History   Problem Relation Age of Onset     Myocardial Infarction Father      Myocardial Infarction Maternal Grandfather      Myocardial Infarction Paternal Grandfather                Allergies   Allergen Reactions     No Known Allergies             Current Outpatient Medications   Medication Sig Dispense Refill     allopurinol (ZYLOPRIM) 300 MG tablet Take 1 tablet (300 mg) by mouth daily. 90 tablet 3     Boswellia-Glucosamine-Vit D (OSTEO BI-FLEX ONE PER DAY PO) Take by mouth daily.       Calcium Citrate-Vitamin D (CALCIUM + D PO) Take by mouth daily.       diclofenac (VOLTAREN) 1 % topical gel Apply 4 g topically 4 times daily (Patient not taking: Reported on 11/1/2024) 100 g 0     ibuprofen (ADVIL/MOTRIN) 200 MG tablet Take 400-600 mg by mouth every 4 hours as needed for pain. (Patient not taking: Reported on 1/29/2025)       lamoTRIgine (LAMICTAL) 100 MG tablet Take 1 tablet (100 mg) by mouth 2 times daily. 180 tablet 1     Multiple Vitamins-Minerals (MULTIVITAMIN MEN  50+ PO) Take by mouth daily.       Turmeric-Love-Black Pepper 125-6-50 MG-MG-MCG CHEW Take 3 chew tab by mouth daily.       vitamin D3 (CHOLECALCIFEROL) 1.25 MG (52892 UT) capsule Take 1 capsule (50,000 Units) by mouth every 7 days. for the next 12 weeks. 12 capsule 0          Review Of Systems  Skin: negative  Eyes: negative  Ears/Nose/Throat: negative  Respiratory: No shortness of breath, dyspnea on exertion, cough, or hemoptysis    O: Physical Exam:  No change physical exam L knee, continues hinged knee brace.  CMS intact to LLE.    Lab:LESLEE 1:160 speckled, Uric Acid 5.3, vit D 63    Images:  Narrative & Impression   XR KNEE LEFT 3 VIEWS 8/7/2024 8:31 AM      HISTORY: Knee pain, swelling     COMPARISON: None.                                                                          IMPRESSION: The left knee is made for fracture or compartmental  narrowing. No effusion.   MR left knee without contrast 8/22/2024 11:34 AM     Techniques: Multiplanar multisequence imaging of the left knee was  obtained without administration of intra-articular or intravenous  contrast using routine protocol.     History: Acute pain of left knee     Comparison: Left knee radiographs 8/7/2024     Findings:     MENISCI:  Medial meniscus: Complex degenerative tearing of the medial meniscus  body with radial and horizontal components and meniscal flap in the  meniscotibial recess.  Lateral meniscus: Blunting of the free edge of posterior horn;  otherwise intact.     LIGAMENTS  Cruciate ligaments: Intact.  Medial supporting structures: Intact.  Lateral supporting structures: Intact.     EXTENSOR MECHANISM  Intact. Slightly T2 hyperintense appearance of suprapatellar fat pad  without convex margins; not meeting the criteria for quadriceps  impingement.     FLUID  Small joint effusion. No substantial Baker's cyst.     OSSEOUS and ARTICULAR STRUCTURES  Bones: Edema like signal in the medial tibial plateau and lesser  degree in the medial  femoral condyle. Subcentimeter presumed  enchondroma in the proximal tibia metaphysis.     Patellofemoral compartment: Up to grade IV chondromalacia patella  particularly in the medial facet.     Lateral compartment: No high-grade hyaline cartilage disease.     Medial compartment: Possible high-grade cartilage fissuring in the  central weightbearing surface of lateral femoral condyle compatible  with grade IV chondromalacia (series 6 image 1).     ANCILLARY FINDINGS  None.                                                                      Impression:  1. Complex degenerative tearing of the medial meniscus body with flap  in the meniscotibial recess.     2. Edema like signal in the medial compartment greater on to the tibia  plateau.     3. Grade IV chondromalacia patella and high-grade cartilage fissuring  in the medial femoral condyle.          A:  L knee arthrosis      P:  repeat MRI L knee to compare to study from 8/24  Discussed arthroscopy L knee but in view of possible inflammatory arthropathy and extent of bone pathology and arthropathy more than just meniscal pathology would like further comparison study to help determine whether such intervention might be helpful.  Continue with Rheumatology as well, also continue allopurinol with target uric acid less than 5.0  Notify if exacerbation symptoms  See back after MRI           In addition to the above assessment and plan each active problem on Claude's problem list was evaluated today. This included the questioning of Claude for any medication problems. We will continue the current treatment plan for these active problems except as noted.        Again, thank you for allowing me to participate in the care of your patient.        Sincerely,        Germain Lanza MD    Electronically signed

## 2025-02-24 NOTE — LETTER
2/24/2025      Claude Higgins  [address] [facility] [address]      Dear Colleague,    Thank you for referring your patient, Claude Higgins, to the [facility]. Please see a copy of my visit note below.    S:  Was stepping up on a step over the weekend and felt pain in R foot, unable to walk without crutches today.         Patient Active Problem List   Diagnosis     Hyperlipidemia LDL goal <160     Tobacco use disorder     Unilateral inguinal hernia without obstruction or gangrene, recurrence not specified     Alcohol use     GERD (gastroesophageal reflux disease)     Cannabis abuse     Seizure disorder (H)     Shoulder strain, right, subsequent encounter     hx open tib-fib fracture 2003     History of osteomyelitis 2003     Closed nondisplaced fracture of navicular bone of right foot, initial encounter     Equinus contracture of right ankle            Past Medical History:   Diagnosis Date     Alcohol use     Out-patient chemical dependancy treatment in 2003     GERD (gastroesophageal reflux disease)      Inguinal hernia 2015    left     Tobacco abuse             Past Surgical History:   Procedure Laterality Date     HERNIA REPAIR, INGUINAL RT/LT Right 1976     HERNIORRHAPHY INGUINAL Left 7/10/2015    Procedure: HERNIORRHAPHY INGUINAL;  Surgeon: Vicente Aleman MD;  Location: [facility] OR     SURGICAL HISTORY OF -  Right 7/28/2003-04    Tib/fib, ORIF, bone and skin grafting and arterial bypass     SURGICAL HISTORY OF -  Right 2008    Tib/fib, hardware removal.      TESTICLE SURGERY Right 1993    Torsion correction, and prev. undescended            Social History     Tobacco Use     Smoking status: Every Day     Current packs/day: 1.00     Average packs/day: 1 pack/day for 17.0 years (17.0 ttl pk-yrs)     Types: Cigarettes     Smokeless tobacco: Current     Types: Chew   Substance Use Topics     Alcohol use: Yes     Alcohol/week: 14.0 standard drinks of alcohol     Types: 14  Shots of liquor per week     Comment: 2 vodka drinks daily            Family History   Problem Relation Age of Onset     Myocardial Infarction Father      Myocardial Infarction Maternal Grandfather      Myocardial Infarction Paternal Grandfather                Allergies   Allergen Reactions     No Known Allergies             Current Outpatient Medications   Medication Sig Dispense Refill     allopurinol (ZYLOPRIM) 300 MG tablet Take 1 tablet (300 mg) by mouth daily. 90 tablet 3     Boswellia-Glucosamine-Vit D (OSTEO BI-FLEX ONE PER DAY PO) Take by mouth daily.       Calcium Citrate-Vitamin D (CALCIUM + D PO) Take by mouth daily.       diclofenac (VOLTAREN) 1 % topical gel Apply 4 g topically 4 times daily (Patient not taking: Reported on 2/24/2025) 100 g 0     ibuprofen (ADVIL/MOTRIN) 200 MG tablet Take 400-600 mg by mouth every 4 hours as needed for pain.       lamoTRIgine (LAMICTAL) 100 MG tablet Take 1 tablet (100 mg) by mouth 2 times daily. 180 tablet 1     Multiple Vitamins-Minerals (MULTIVITAMIN MEN 50+ PO) Take by mouth daily.       Turmeric-Ginger-Black Pepper 125-6-50 MG-MG-MCG CHEW Take 3 chew tab by mouth daily.       vitamin D3 (CHOLECALCIFEROL) 1.25 MG (46805 UT) capsule Take 1 capsule (50,000 Units) by mouth every 7 days. for the next 12 weeks. 12 capsule 0          Review Of Systems  Skin: negative  Eyes: negative  Ears/Nose/Throat: negative  Respiratory: No shortness of breath, dyspnea on exertion, cough, or hemoptysis    O: Physical Exam:  Pain to palpation R lateral cuboid.  CMS intact to toes.      Lab:  see knee visit    Images:  avulsion fx, probably acute on chronic R lateral cuboid with soft tissue swelling.       Narrative & Impression   FOOT RIGHT THREE OR MORE VIEWS  July 27, 2021 10:09 AM      HISTORY: Closed nondisplaced fracture of navicular bone of right foot,  initial encounter.     COMPARISON: Right foot x-rays dated 6/29/2021.                                                                       IMPRESSION:  1. No change in alignment of the dorsal intra-articular navicular  fracture. No definite callus formation is seen.  2. Irregularity of the distal fibula in tibia are again noted and  consistent with chronic healed injury.  3. Irregularity of the soft tissues along the dorsal aspect of the  distal lower leg is partially imaged. This is similar to the prior  study.  4. No other changes.        A:  Avulsion fx R lateral cuboid acute on chronic      P:  stiffed soled shoe or cam boot (which he has at home) for R foot over next month  Notify if  exacerbation symptoms  See back one month, repeat images if continued problems             In addition to the above assessment and plan each active problem on Claude's problem list was evaluated today. This included the questioning of Claude for any medication problems. We will continue the current treatment plan for these active problems except as noted.        Again, thank you for allowing me to participate in the care of your patient.        Sincerely,        Germain Lanza MD    Electronically signed

## 2025-03-18 ENCOUNTER — TELEPHONE (OUTPATIENT)
Dept: ORTHOPEDICS | Facility: CLINIC | Age: 50
End: 2025-03-18
Payer: COMMERCIAL

## 2025-03-18 NOTE — TELEPHONE ENCOUNTER
Forms/Letter Request    Type of form/letter: OTHER: return to work      Do we have the form/letter: No    Who is the form from?  Need a return to work note/letter     When is form/letter needed by: asap before Friday 3/28 because I want to return to work on Monday 3/24/25. Please call me when the letter is ready to be picked up and location too.     How would you like the form/letter returned:     Patient Notified form requests are processed in 5-7 business days:Yes    Could we send this information to you in [facility] or would you prefer to receive a phone call?:   Patient would prefer a phone call   Okay to leave a detailed message?: Yes at Cell number on file:    Telephone Information:   Mobile [phone]

## 2025-03-19 NOTE — TELEPHONE ENCOUNTER
Follow up appointment with Dr. Lanza made for tomorrow, work note will be completed and given to patient at this appointment. Will Close this encounter now as nothing further is needed at this time.    Lin Akins RN   [facility]

## 2025-03-20 ENCOUNTER — OFFICE VISIT (OUTPATIENT)
Dept: ORTHOPEDICS | Facility: CLINIC | Age: 50
End: 2025-03-20
Payer: COMMERCIAL

## 2025-03-20 VITALS — BODY MASS INDEX: 24.83 KG/M2 | WEIGHT: 175.5 LBS

## 2025-03-20 DIAGNOSIS — M17.12 ARTHRITIS OF LEFT KNEE: Primary | ICD-10-CM

## 2025-03-20 DIAGNOSIS — E55.9 INADEQUATE INTAKE OF CALCIUM AND VITAMIN D: ICD-10-CM

## 2025-03-20 DIAGNOSIS — E58 INADEQUATE INTAKE OF CALCIUM AND VITAMIN D: ICD-10-CM

## 2025-03-20 RX ORDER — CEFAZOLIN SODIUM 2 G/50ML
2 SOLUTION INTRAVENOUS SEE ADMIN INSTRUCTIONS
OUTPATIENT
Start: 2025-03-20

## 2025-03-20 RX ORDER — CEFAZOLIN SODIUM 2 G/50ML
2 SOLUTION INTRAVENOUS
OUTPATIENT
Start: 2025-03-20

## 2025-03-20 NOTE — PATIENT INSTRUCTIONS
Thank you for choosing [facility]!      FOLLOW-UP  Dr. Lanza would like you to follow-up in 1 week prior to surgery. Please stop by the  on your way out or call [phone] to schedule.         IMAGING  Please call [phone] to schedule your DEXA scan.     *Once your imaging exam has been scheduled, our prior authorization team will connect with your insurance to ensure coverage of the exam. They will only reach out to you if a prior authorization is denied.  Please schedule your imaging exam at least 7 days out so our team has time to complete this process.  Failure to do so could result in insurance denial and you becoming responsible for the cost of the exam.     After your imaging appointment is scheduled, call [phone] to schedule your an in-person follow-up appointment with Dr. Lanza to discuss your results. .         PROCEEDING WITH SURGERY  Because of your severe arthritis and failure of conservative measures, we have decided to proceed with a Left knee scope in [place].  It is important to remember that this is an elective procedure. This gives you time to prepare for surgery and for the post-surgical period. After booking surgery, you will need to designate a [facility]  to help support you through your surgery. It also important to think about a discharge plan for after your surgery- is there someone who can stay with you or you can stay   with while recovering? Going home alone is not necessarily a barrier to discharge, but we want you to be prepared.     The risks for this procedure include, but are not limited to:  *Blood loss possibly requiring transfusion  *Blood clots with possible pulmonary embolism  *Infection or wound healing problems  *Nerve damage - you may experience ongoing numbness/tingling  *Vascular circulation problems  *Continued pain  *Instability  *Loosening or wear  *Anesthetic risks  *The possibility of needing additional  procedures    Scheduling your surgery:   *Our surgery schedulers will call you to schedule surgery.  If you don't get a call in the next few days, you can call [phone] to schedule.   *You will be receiving a packet of information via mail or [employer] once your surgery is scheduled.     Prior to surgery:  *You will need to have some lab work completed. Please stop by our specialty lab following your appt, or call [phone] to schedule a lab appointment prior to your surgery date.   *Please contact your dentist for a letter clearing you to proceed with surgery. This can be faxed to [phone].  *A pre-op physical with your primary physician is needed within 30 days of the surgery.  *You will need to complete a DEXA scan prior to surgery. Please call [phone] to schedule this. .    Leading up to surgery:   *Nothing to eat or drink for 8 hours before surgery.  Do not wear make-up, nail polish or jewelry for your surgery.   *Stop blood thinners 5 days before surgery (aspirin, warfarin, anti-inflammatories). 7-10 prior to surgery, you will be asked to stop taking any aspirin, aspirin-like   products, and anti-inflammatory medications, such as Motrin, Voltaren, and Naprosyn. This is done to prevent any excess bleeding during the time of your surgery.  *Staff from our surgery team will be reaching out with your arrival time 2-3 days prior to your procedure as the time is subject to change if any emergency cases are added on.     Recovery:  *Recovery time differs from patient to patient.    *You may spend 1 night in the hospital, but most people can return home at that point.  *You will be in Physical Therapy for 1-2 months until you have gained full range of motion. Physical therapy should reach out to you to schedule appointments for after your procedure, however, if you do not hear from them within 2 business days please call [phone].     Dental appointments:   *Routine dental cleanings should be done  before surgery, it is recommended that no cleaning, dental procedures or colonoscopies be done for 6 months after joint replacement.   *Lifetime prophylactic antibiotics will be required before dental procedures, dental cleaning, and any non-sterile procedures. Please reach out via [employer] or by calling [phone] if you are in need of a refill.

## 2025-03-20 NOTE — LETTER
3/20/2025      Claude Higgins  [id number] [facility] [address]      Dear Colleague,    Thank you for referring your patient, Claude Higgins, to the [facility]. Please see a copy of my visit note below.    S:  Foot and ankle doing well and would like to return to work without restriction.  Continues to have issues with L knee.  Continues with brace.  Has been evaluated by Rheumatology.         Patient Active Problem List   Diagnosis     Hyperlipidemia LDL goal <160     Tobacco use disorder     Unilateral inguinal hernia without obstruction or gangrene, recurrence not specified     Alcohol use     GERD (gastroesophageal reflux disease)     Cannabis abuse     Seizure disorder (H)     Shoulder strain, right, subsequent encounter     hx open tib-fib fracture 2003     History of osteomyelitis 2003     Closed nondisplaced fracture of navicular bone of right foot, initial encounter     Equinus contracture of right ankle            Past Medical History:   Diagnosis Date     Alcohol use     Out-patient chemical dependancy treatment in 2003     GERD (gastroesophageal reflux disease)      Inguinal hernia 2015    left     Tobacco abuse             Past Surgical History:   Procedure Laterality Date     HERNIA REPAIR, INGUINAL RT/LT Right 1976     HERNIORRHAPHY INGUINAL Left 7/10/2015    Procedure: HERNIORRHAPHY INGUINAL;  Surgeon: Vicente Aleman MD;  Location: [facility]     SURGICAL HISTORY OF -  Right 7/28/2003-04    Tib/fib, ORIF, bone and skin grafting and arterial bypass     SURGICAL HISTORY OF -  Right 2008    Tib/fib, hardware removal.      TESTICLE SURGERY Right 1993    Torsion correction, and prev. undescended            Social History     Tobacco Use     Smoking status: Every Day     Current packs/day: 1.00     Average packs/day: 1 pack/day for 17.0 years (17.0 ttl pk-yrs)     Types: Cigarettes     Smokeless tobacco: Current     Types: Chew   Substance Use Topics     Alcohol  use: Yes     Alcohol/week: 14.0 standard drinks of alcohol     Types: 14 Shots of liquor per week     Comment: 2 vodka drinks daily            Family History   Problem Relation Age of Onset     Myocardial Infarction Father      Myocardial Infarction Maternal Grandfather      Myocardial Infarction Paternal Grandfather                Allergies   Allergen Reactions     No Known Allergies             Current Outpatient Medications   Medication Sig Dispense Refill     allopurinol (ZYLOPRIM) 300 MG tablet Take 1 tablet (300 mg) by mouth daily. 90 tablet 3     Boswellia-Glucosamine-Vit D (OSTEO BI-FLEX ONE PER DAY PO) Take by mouth daily.       Calcium Citrate-Vitamin D (CALCIUM + D PO) Take by mouth daily.       ibuprofen (ADVIL/MOTRIN) 200 MG tablet Take 400-600 mg by mouth every 4 hours as needed for pain.       lamoTRIgine (LAMICTAL) 100 MG tablet Take 1 tablet (100 mg) by mouth 2 times daily. 180 tablet 1     Multiple Vitamins-Minerals (MULTIVITAMIN MEN 50+ PO) Take by mouth daily.       Turmeric-Ginger-Black Pepper 125-6-50 MG-MG-MCG CHEW Take 3 chew tab by mouth daily.       vitamin D3 (CHOLECALCIFEROL) 1.25 MG (96202 UT) capsule Take 1 capsule (50,000 Units) by mouth every 7 days. for the next 12 weeks. 12 capsule 0     diclofenac (VOLTAREN) 1 % topical gel Apply 4 g topically 4 times daily (Patient not taking: Reported on 11/1/2024) 100 g 0          Review Of Systems  Skin: negative  Eyes: negative  Ears/Nose/Throat: negative  Respiratory: No shortness of breath, dyspnea on exertion, cough, or hemoptysis    O: Physical Exam:  L knee pain to palpation medial joint line.  Adequate knee flexion and extension.  CMS intact.    Lab:  RNP Cary IgG Instrument Value  <5.0 U/mL 1.7     RNP Antibody IgG  Negative Negative    Morales NADEEM Cary IgG Instrument Value  <7.0 U/mL <0.7    Smith NADEEM Antibody IgG  Negative Negative    SSA Cary IgG Instrument Value  <7.0 U/mL >240.0 High     SSA (Ro) Antibody IgG  Negative Positive Abnormal      SSB Cary IgG Instrument Value  <7.0 U/mL 0.8    SSB (La) Antibody IgG  Negative Negative     LESLEE interpretation  Negative Positive Abnormal  Positive Abnormal  CM Positive Abnormal  CM    Comment:  Cytoplasmic pattern observed. The LESLEE test is intended to detect antibodies directed against the cell nucleus. However, antibodies against other cellular structures may also be detected. These are often non-specific, although some clinically significant antibodies directed against cytoplasmic components may be observed. Follow-up testing may be considered if clinically indicated.    Negative:              <1:40  Borderline Positive:   1:40 - 1:80  Positive:              >1:80    LESLEE pattern 1 Speckled Speckled Speckled    LESLEE titer 1 1:160 1:640 1:1280       Images: EXAM: XR FOOT RIGHT G/E 3 VIEWS  LOCATION: [facility]  DATE: 02/24/2025     INDICATION: Injury of right foot, initial encounter.  COMPARISON: None.                                                                      IMPRESSION: Chronic healed fracture deformities of the distal right tibia and fibula. No acute displaced right foot fracture or dislocation is identified. Mild degenerative change at the right first MTP joint. Chronic angular deformity at the DIP joint   of the second toe with chronic healed deformity at the medial base of the fifth toe proximal phalanx near the fifth MTP joint. No advanced joint space narrowing in the right foot. Surgical clips project along the posterior distal right leg soft tissues   with soft tissue deformity in the corresponding location. Correlate clinically.     XR KNEE LEFT 3 VIEWS 8/7/2024 8:31 AM      HISTORY: Knee pain, swelling     COMPARISON: None.                                                                          IMPRESSION: The left knee is made for fracture or compartmental  narrowing. No effusion.  MR left knee without contrast 8/22/2024 11:34 AM     Techniques:  Multiplanar multisequence imaging of the left knee was  obtained without administration of intra-articular or intravenous  contrast using routine protocol.     History: Acute pain of left knee     Comparison: Left knee radiographs 8/7/2024     Findings:     MENISCI:  Medial meniscus: Complex degenerative tearing of the medial meniscus  body with radial and horizontal components and meniscal flap in the  meniscotibial recess.  Lateral meniscus: Blunting of the free edge of posterior horn;  otherwise intact.     LIGAMENTS  Cruciate ligaments: Intact.  Medial supporting structures: Intact.  Lateral supporting structures: Intact.     EXTENSOR MECHANISM  Intact. Slightly T2 hyperintense appearance of suprapatellar fat pad  without convex margins; not meeting the criteria for quadriceps  impingement.     FLUID  Small joint effusion. No substantial Baker's cyst.     OSSEOUS and ARTICULAR STRUCTURES  Bones: Edema like signal in the medial tibial plateau and lesser  degree in the medial femoral condyle. Subcentimeter presumed  enchondroma in the proximal tibia metaphysis.     Patellofemoral compartment: Up to grade IV chondromalacia patella  particularly in the medial facet.     Lateral compartment: No high-grade hyaline cartilage disease.     Medial compartment: Possible high-grade cartilage fissuring in the  central weightbearing surface of lateral femoral condyle compatible  with grade IV chondromalacia (series 6 image 1).     ANCILLARY FINDINGS  None.                                                                      Impression:  1. Complex degenerative tearing of the medial meniscus body with flap  in the meniscotibial recess.     2. Edema like signal in the medial compartment greater on to the tibia  plateau.     3. Grade IV chondromalacia patella and high-grade cartilage fissuring  in the medial femoral condyle.      A:    L knee tibial metaphyseal edema, complex tear medial meniscus, grade IV chondromalacia patella  with possible inflammatory arthropathy    P:  continue evaluation and possible treatment through Rheumatology  Recent MRI request denied, move forward with arthroscopy L knee spongilization/ meniscal debridement if indicated.  See back as preop  Continue brace  Continue knee restrictions  Okay to return to work without restriction for foot and ankle  Notify if exacerbation symptoms             In addition to the above assessment and plan each active problem on Claude's problem list was evaluated today. This included the questioning of Claude for any medication problems. We will continue the current treatment plan for these active problems except as noted.      Again, thank you for allowing me to participate in the care of your patient.        Sincerely,        Germain Lanza MD    Electronically signed

## 2025-03-20 NOTE — PROGRESS NOTES
S:  Foot and ankle doing well and would like to return to work without restriction.  Continues to have issues with L knee.  Continues with brace.  Has been evaluated by Rheumatology.         Patient Active Problem List   Diagnosis    Hyperlipidemia LDL goal <160    Tobacco use disorder    Unilateral inguinal hernia without obstruction or gangrene, recurrence not specified    Alcohol use    GERD (gastroesophageal reflux disease)    Cannabis abuse    Seizure disorder (H)    Shoulder strain, right, subsequent encounter    hx open tib-fib fracture 2003    History of osteomyelitis 2003    Closed nondisplaced fracture of navicular bone of right foot, initial encounter    Equinus contracture of right ankle            Past Medical History:   Diagnosis Date    Alcohol use     Out-patient chemical dependancy treatment in 2003    GERD (gastroesophageal reflux disease)     Inguinal hernia 2015    left    Tobacco abuse             Past Surgical History:   Procedure Laterality Date    HERNIA REPAIR, INGUINAL RT/LT Right 1976    HERNIORRHAPHY INGUINAL Left 7/10/2015    Procedure: HERNIORRHAPHY INGUINAL;  Surgeon: Vicente Aleman MD;  Location: [facility]    SURGICAL HISTORY OF -  Right 7/28/2003-04    Tib/fib, ORIF, bone and skin grafting and arterial bypass    SURGICAL HISTORY OF -  Right 2008    Tib/fib, hardware removal.     TESTICLE SURGERY Right 1993    Torsion correction, and prev. undescended            Social History     Tobacco Use    Smoking status: Every Day     Current packs/day: 1.00     Average packs/day: 1 pack/day for 17.0 years (17.0 ttl pk-yrs)     Types: Cigarettes    Smokeless tobacco: Current     Types: Chew   Substance Use Topics    Alcohol use: Yes     Alcohol/week: 14.0 standard drinks of alcohol     Types: 14 Shots of liquor per week     Comment: 2 vodka drinks daily            Family History   Problem Relation Age of Onset    Myocardial Infarction Father     Myocardial Infarction Maternal Grandfather      Myocardial Infarction Paternal Grandfather                Allergies   Allergen Reactions    No Known Allergies             Current Outpatient Medications   Medication Sig Dispense Refill    allopurinol (ZYLOPRIM) 300 MG tablet Take 1 tablet (300 mg) by mouth daily. 90 tablet 3    Boswellia-Glucosamine-Vit D (OSTEO BI-FLEX ONE PER DAY PO) Take by mouth daily.      Calcium Citrate-Vitamin D (CALCIUM + D PO) Take by mouth daily.      ibuprofen (ADVIL/MOTRIN) 200 MG tablet Take 400-600 mg by mouth every 4 hours as needed for pain.      lamoTRIgine (LAMICTAL) 100 MG tablet Take 1 tablet (100 mg) by mouth 2 times daily. 180 tablet 1    Multiple Vitamins-Minerals (MULTIVITAMIN MEN 50+ PO) Take by mouth daily.      Turmeric-Ginger-Black Pepper 125-6-50 MG-MG-MCG CHEW Take 3 chew tab by mouth daily.      vitamin D3 (CHOLECALCIFEROL) 1.25 MG (22379 UT) capsule Take 1 capsule (50,000 Units) by mouth every 7 days. for the next 12 weeks. 12 capsule 0    diclofenac (VOLTAREN) 1 % topical gel Apply 4 g topically 4 times daily (Patient not taking: Reported on 11/1/2024) 100 g 0          Review Of Systems  Skin: negative  Eyes: negative  Ears/Nose/Throat: negative  Respiratory: No shortness of breath, dyspnea on exertion, cough, or hemoptysis    O: Physical Exam:  L knee pain to palpation medial joint line.  Adequate knee flexion and extension.  CMS intact.    Lab:  RNP Cary IgG Instrument Value  <5.0 U/mL 1.7     RNP Antibody IgG  Negative Negative    Morales NADEEM Cary IgG Instrument Value  <7.0 U/mL <0.7    Smith NADEEM Antibody IgG  Negative Negative    SSA Cary IgG Instrument Value  <7.0 U/mL >240.0 High     SSA (Ro) Antibody IgG  Negative Positive Abnormal     SSB Cary IgG Instrument Value  <7.0 U/mL 0.8    SSB (La) Antibody IgG  Negative Negative     LESLEE interpretation  Negative Positive Abnormal  Positive Abnormal  CM Positive Abnormal  CM    Comment:  Cytoplasmic pattern observed. The LESLEE test is intended to detect antibodies directed  against the cell nucleus. However, antibodies against other cellular structures may also be detected. These are often non-specific, although some clinically significant antibodies directed against cytoplasmic components may be observed. Follow-up testing may be considered if clinically indicated.    Negative:              <1:40  Borderline Positive:   1:40 - 1:80  Positive:              >1:80    LESLEE pattern 1 Speckled Speckled Speckled    LESLEE titer 1 1:160 1:640 1:1280       Images: EXAM: XR FOOT RIGHT G/E 3 VIEWS  LOCATION: [facility]  DATE: 02/24/2025     INDICATION: Injury of right foot, initial encounter.  COMPARISON: None.                                                                      IMPRESSION: Chronic healed fracture deformities of the distal right tibia and fibula. No acute displaced right foot fracture or dislocation is identified. Mild degenerative change at the right first MTP joint. Chronic angular deformity at the DIP joint   of the second toe with chronic healed deformity at the medial base of the fifth toe proximal phalanx near the fifth MTP joint. No advanced joint space narrowing in the right foot. Surgical clips project along the posterior distal right leg soft tissues   with soft tissue deformity in the corresponding location. Correlate clinically.     XR KNEE LEFT 3 VIEWS 8/7/2024 8:31 AM      HISTORY: Knee pain, swelling     COMPARISON: None.                                                                          IMPRESSION: The left knee is made for fracture or compartmental  narrowing. No effusion.  MR left knee without contrast 8/22/2024 11:34 AM     Techniques: Multiplanar multisequence imaging of the left knee was  obtained without administration of intra-articular or intravenous  contrast using routine protocol.     History: Acute pain of left knee     Comparison: Left knee radiographs 8/7/2024     Findings:     MENISCI:  Medial meniscus: Complex  degenerative tearing of the medial meniscus  body with radial and horizontal components and meniscal flap in the  meniscotibial recess.  Lateral meniscus: Blunting of the free edge of posterior horn;  otherwise intact.     LIGAMENTS  Cruciate ligaments: Intact.  Medial supporting structures: Intact.  Lateral supporting structures: Intact.     EXTENSOR MECHANISM  Intact. Slightly T2 hyperintense appearance of suprapatellar fat pad  without convex margins; not meeting the criteria for quadriceps  impingement.     FLUID  Small joint effusion. No substantial Baker's cyst.     OSSEOUS and ARTICULAR STRUCTURES  Bones: Edema like signal in the medial tibial plateau and lesser  degree in the medial femoral condyle. Subcentimeter presumed  enchondroma in the proximal tibia metaphysis.     Patellofemoral compartment: Up to grade IV chondromalacia patella  particularly in the medial facet.     Lateral compartment: No high-grade hyaline cartilage disease.     Medial compartment: Possible high-grade cartilage fissuring in the  central weightbearing surface of lateral femoral condyle compatible  with grade IV chondromalacia (series 6 image 1).     ANCILLARY FINDINGS  None.                                                                      Impression:  1. Complex degenerative tearing of the medial meniscus body with flap  in the meniscotibial recess.     2. Edema like signal in the medial compartment greater on to the tibia  plateau.     3. Grade IV chondromalacia patella and high-grade cartilage fissuring  in the medial femoral condyle.      A:    L knee tibial metaphyseal edema, complex tear medial meniscus, grade IV chondromalacia patella with possible inflammatory arthropathy    P:  continue evaluation and possible treatment through Rheumatology  Recent MRI request denied, move forward with arthroscopy L knee spongilization/ meniscal debridement if indicated.  See back as preop  Continue brace  Continue knee  restrictions  Okay to return to work without restriction for foot and ankle  Notify if exacerbation symptoms             In addition to the above assessment and plan each active problem on Claude's problem list was evaluated today. This included the questioning of Claude for any medication problems. We will continue the current treatment plan for these active problems except as noted.

## 2025-03-20 NOTE — LETTER
March 20, 2025        To Whom It May Concern:    Claude Higgins was seen in our clinic today today. He may return to work with the following: limited to light duty - No squatting, no kneeling, no latter's and no scaffolding until cleared by Dr. Lanza.    Continue restrictions with left knee and Claude cleared with right foot.         Sincerely,              Germain Lanza

## 2025-04-18 ENCOUNTER — HOSPITAL ENCOUNTER (OUTPATIENT)
Dept: BONE DENSITY | Facility: CLINIC | Age: 50
Discharge: HOME | End: 2025-04-18
Attending: ORTHOPAEDIC SURGERY | Admitting: ORTHOPAEDIC SURGERY
Payer: OTHER MISCELLANEOUS

## 2025-04-18 DIAGNOSIS — Z87.81 HISTORY OF FRACTURE OF TIBIA: ICD-10-CM

## 2025-04-18 PROCEDURE — 77080 DXA BONE DENSITY AXIAL: CPT

## 2025-05-16 DIAGNOSIS — R56.9 SEIZURES (H): ICD-10-CM

## 2025-05-19 RX ORDER — LAMOTRIGINE 100 MG/1
100 TABLET ORAL 2 TIMES DAILY
Qty: 186 TABLET | Refills: 3 | Status: SHIPPED | OUTPATIENT
Start: 2025-05-19

## 2025-05-19 NOTE — TELEPHONE ENCOUNTER
LAMOTRIGINE 100MG TABS   Last Written Prescription:  11/12/24  #180,1 refill  ----------------------  Last Visit Date: 11/1/24  Future Visit Date: 11/7/25  -----------  Refill decision: Medication unable to be refilled by RN due to:  Abnormal labs/test:  I reviewed your blood work, your lamotrigine level is subtherapeutic. Please increase your lamotrigine to 100 mg twice a day. I will prescribe 100 mg tablets, 1 tablet twice a day. I will place an order for rechecking lamotrigine level in 1 week     Request from pharmacy:  Requested Prescriptions   Pending Prescriptions Disp Refills    lamoTRIgine (LAMICTAL) 100 MG tablet [Pharmacy Med Name: LAMOTRIGINE 100MG TABS] 180 tablet 1     Sig: TAKE ONE TABLET BY MOUTH TWICE A DAY       Anti-Seizure Meds Protocol  Failed - 5/19/2025  7:50 AM        Failed - Review Authorizing provider's last note.      Refer to last progress notes: confirm request is for original authorizing provider (cannot be through other providers).          Failed - Normal ALT or AST on file in past 26 months     No lab results found.  No lab results found.          Failed - Has GFR on file in past 12 months and most recent value is normal                     Passed - Recent (12 mo) or future (90 days) visit within the authorizing provider's specialty     The patient must have completed an in-person or virtual visit within the past 12 months or has a future visit scheduled within the next 90 days with the authorizing provider s specialty.  Urgent care and e-visits do not qualify as an office visit for this protocol.          Passed - Medication indicated for associated diagnosis     Medication is associated with one or more of the following diagnoses:     Bipolar   Dementia   Depression   Epilepsy   Migraine   Seizure   Trigeminal Neuralgia   Cyclothymia

## 2025-05-22 NOTE — TELEPHONE ENCOUNTER
Reason for Call:  Other call back    Detailed comments: patient is requesting a call back from Nurse regarding his medication. Please follow up with patient. Thanks.    Phone Number Patient can be reached at: Cell number on file:    Telephone Information:   Mobile [phone]       Best Time: anytime     Can we leave a detailed message on this number? YES    Call taken on 7/5/2017 at 12:33 PM by Randall Robertson       Plan: Recommend SPF 30+ broad spectrum, reapply every 2 hours or after sweating/swimming Detail Level: Zone

## 2025-06-05 ENCOUNTER — OFFICE VISIT (OUTPATIENT)
Dept: FAMILY MEDICINE | Facility: CLINIC | Age: 50
End: 2025-06-05
Payer: OTHER MISCELLANEOUS

## 2025-06-05 VITALS
RESPIRATION RATE: 18 BRPM | WEIGHT: 164.6 LBS | TEMPERATURE: 97.9 F | SYSTOLIC BLOOD PRESSURE: 130 MMHG | HEART RATE: 78 BPM | DIASTOLIC BLOOD PRESSURE: 82 MMHG | BODY MASS INDEX: 23.28 KG/M2 | OXYGEN SATURATION: 99 %

## 2025-06-05 DIAGNOSIS — Z13.220 LIPID SCREENING: ICD-10-CM

## 2025-06-05 DIAGNOSIS — Z12.11 COLON CANCER SCREENING: ICD-10-CM

## 2025-06-05 DIAGNOSIS — M17.12 ARTHRITIS OF KNEE, LEFT: ICD-10-CM

## 2025-06-05 DIAGNOSIS — G40.909 SEIZURE DISORDER (H): ICD-10-CM

## 2025-06-05 DIAGNOSIS — Z80.0 FAMILY HISTORY OF COLON CANCER: ICD-10-CM

## 2025-06-05 DIAGNOSIS — Z11.59 NEED FOR HEPATITIS C SCREENING TEST: ICD-10-CM

## 2025-06-05 DIAGNOSIS — M81.0 OSTEOPOROSIS, UNSPECIFIED OSTEOPOROSIS TYPE, UNSPECIFIED PATHOLOGICAL FRACTURE PRESENCE: ICD-10-CM

## 2025-06-05 DIAGNOSIS — Z01.818 PREOP GENERAL PHYSICAL EXAM: Primary | ICD-10-CM

## 2025-06-05 PROBLEM — Z87.39 HISTORY OF OSTEOMYELITIS: Status: RESOLVED | Noted: 2021-06-08 | Resolved: 2025-06-05

## 2025-06-05 PROBLEM — S92.254A CLOSED NONDISPLACED FRACTURE OF NAVICULAR BONE OF RIGHT FOOT, INITIAL ENCOUNTER: Status: RESOLVED | Noted: 2021-06-08 | Resolved: 2025-06-05

## 2025-06-05 PROBLEM — S46.911D SHOULDER STRAIN, RIGHT, SUBSEQUENT ENCOUNTER: Status: RESOLVED | Noted: 2017-08-09 | Resolved: 2025-06-05

## 2025-06-05 LAB
ALBUMIN SERPL BCG-MCNC: 4.4 G/DL (ref 3.5–5.2)
ALP SERPL-CCNC: 68 U/L (ref 40–150)
ALT SERPL W P-5'-P-CCNC: 43 U/L (ref 0–70)
ANION GAP SERPL CALCULATED.3IONS-SCNC: 8 MMOL/L (ref 7–15)
AST SERPL W P-5'-P-CCNC: 36 U/L (ref 0–45)
BILIRUB SERPL-MCNC: 0.4 MG/DL
BUN SERPL-MCNC: 13.1 MG/DL (ref 6–20)
CALCIUM SERPL-MCNC: 9.8 MG/DL (ref 8.8–10.4)
CHLORIDE SERPL-SCNC: 107 MMOL/L (ref 98–107)
CHOLEST SERPL-MCNC: 220 MG/DL
CREAT SERPL-MCNC: 0.61 MG/DL (ref 0.67–1.17)
EGFRCR SERPLBLD CKD-EPI 2021: >90 ML/MIN/1.73M2
FASTING STATUS PATIENT QL REPORTED: YES
FASTING STATUS PATIENT QL REPORTED: YES
GLUCOSE SERPL-MCNC: 94 MG/DL (ref 70–99)
HCO3 SERPL-SCNC: 27 MMOL/L (ref 22–29)
HDLC SERPL-MCNC: 42 MG/DL
LDLC SERPL CALC-MCNC: 151 MG/DL
NONHDLC SERPL-MCNC: 178 MG/DL
POTASSIUM SERPL-SCNC: 4.1 MMOL/L (ref 3.4–5.3)
PROT SERPL-MCNC: 8.3 G/DL (ref 6.4–8.3)
SODIUM SERPL-SCNC: 142 MMOL/L (ref 135–145)
TRIGL SERPL-MCNC: 136 MG/DL

## 2025-06-05 ASSESSMENT — PAIN SCALES - GENERAL: PAINLEVEL_OUTOF10: NO PAIN (0)

## 2025-06-05 NOTE — PROGRESS NOTES
Preoperative Evaluation  [address] [address]  Phone: [phone]  Fax: [phone]  Primary Provider: Physician No Ref-Primary  Pre-op Performing Provider: Shai Randhawa MD  Jun 5, 2025 6/5/2025   Surgical Information   What procedure is being done? knee   Facility or Hospital where procedure/surgery will be performed: [facility]   Who is doing the procedure / surgery? Dr. Lanza   Date of surgery / procedure: 6/12/25   Time of surgery / procedure: 6am   Where do you plan to recover after surgery? at home with family     Fax number for surgical facility: Note does not need to be faxed, will be available electronically in Epic.    Assessment & Plan     The proposed surgical procedure is considered INTERMEDIATE risk.    ASSESSMENT/ORDERS:    ICD-10-CM    1. Preop general physical exam  Z01.818 Comprehensive metabolic panel (BMP + Alb, Alk Phos, ALT, AST, Total. Bili, TP)     Comprehensive metabolic panel (BMP + Alb, Alk Phos, ALT, AST, Total. Bili, TP)      2. Arthritis of knee, left  M17.12       3. Osteoporosis, unspecified osteoporosis type, unspecified pathological fracture presence  M81.0       4. Seizure disorder (H)  G40.909 Comprehensive metabolic panel (BMP + Alb, Alk Phos, ALT, AST, Total. Bili, TP)     Comprehensive metabolic panel (BMP + Alb, Alk Phos, ALT, AST, Total. Bili, TP)      5. Colon cancer screening  Z12.11 Colonoscopy Screening  Referral      6. Family history of colon cancer  Z80.0 Colonoscopy Screening  Referral      7. Need for hepatitis C screening test  Z11.59 Hepatitis C Screen Reflex to HCV RNA Quant and Genotype     Hepatitis C Screen Reflex to HCV RNA Quant and Genotype      8. Lipid screening  Z13.220 Lipid panel reflex to direct LDL Fasting     Lipid panel reflex to direct LDL Fasting        PLAN:   Follow-up in 4-6 weeks for discussion on osteoporosis    The longitudinal plan of care for  the diagnosis(es)/condition(s) as documented were addressed during this visit. Due to the added complexity in care, I will continue to support Claude in the subsequent management and with ongoing continuity of care.            - No identified additional risk factors other than previously addressed    Preoperative Medication Instructions  Antiplatelet or Anticoagulation Medication Instructions   - We reviewed the medication list and the patient is not on an antiplatelet or anticoagulation medications.    Additional Medication Instructions  Take all scheduled medications on the day of surgery EXCEPT for modifications listed below:   - Herbal medications and vitamins: DO NOT TAKE 14 days prior to surgery.   - ibuprofen (Advil, Motrin): DO NOT TAKE 1 day before surgery.     Recommendation  Approval given to proceed with proposed procedure, without further diagnostic evaluation.        Subjective   Claude is a 50 year old, presenting for the following:  Pre-Op Exam          6/5/2025     9:00 AM   Additional Questions   Roomed by Josefina SILVERIO: knee arthritis, recommended for surgery.    History of osteoporosis. New patient to me today. Dexa scan is positive for osteoporosis in left hip.          6/5/2025   Pre-Op Questionnaire   Have you ever had a heart attack or stroke? No   Have you ever had surgery on your heart or blood vessels, such as a stent placement, a coronary artery bypass, or surgery on an artery in your head, neck, heart, or legs? No   Do you have chest pain with activity? No   Do you have a history of heart failure? No   Do you currently have a cold, bronchitis or symptoms of other infection? No   Do you have a cough, shortness of breath, or wheezing? No   Do you or anyone in your family have previous history of blood clots? No   Do you or does anyone in your family have a serious bleeding problem such as prolonged bleeding following surgeries or cuts? No   Have you ever had problems with anemia or been  told to take iron pills? No   Have you had any abnormal blood loss such as black, tarry or bloody stools? No   Have you ever had a blood transfusion? No   Are you willing to have a blood transfusion if it is medically needed before, during, or after your surgery? Yes   Have you or any of your relatives ever had problems with anesthesia? No   Do you have sleep apnea, excessive snoring or daytime drowsiness? No   Do you have any artifical heart valves or other implanted medical devices like a pacemaker, defibrillator, or continuous glucose monitor? No   Do you have artificial joints? No   Are you allergic to latex? No     Advance Care Planning    Discussed advance care planning with patient; informed AVS has link to [facility].    Preoperative Review of    reviewed - no record of controlled substances prescribed.          Patient Active Problem List    Diagnosis Date Noted    Family history of colon cancer 06/05/2025     Priority: Medium    hx open tib-fib fracture 2003 06/08/2021     Priority: Medium    Equinus contracture of right ankle 06/08/2021     Priority: Medium    Cannabis abuse 10/07/2016     Priority: Medium    Seizure disorder (H) 10/07/2016     Priority: Medium    Tobacco use disorder 07/02/2015     Priority: Medium    Unilateral inguinal hernia without obstruction or gangrene, recurrence not specified 07/02/2015     Priority: Medium    Alcohol use      Priority: Medium    Hyperlipidemia LDL goal <160 06/29/2015     Priority: Medium      Past Medical History:   Diagnosis Date    Alcohol use     Out-patient chemical dependancy treatment in 2003    GERD (gastroesophageal reflux disease)     Inguinal hernia 2015    left    Tobacco abuse      Past Surgical History:   Procedure Laterality Date    HERNIA REPAIR, INGUINAL RT/LT Right 1976    HERNIORRHAPHY INGUINAL Left 7/10/2015    Procedure: HERNIORRHAPHY INGUINAL;  Surgeon: Vicente Aleman MD;  Location:  OR    SURGICAL HISTORY OF -  Right  7/28/2003-04    Tib/fib, ORIF, bone and skin grafting and arterial bypass    SURGICAL HISTORY OF -  Right 2008    Tib/fib, hardware removal.     TESTICLE SURGERY Right 1993    Torsion correction, and prev. undescended     Current Outpatient Medications   Medication Sig Dispense Refill    allopurinol (ZYLOPRIM) 300 MG tablet Take 1 tablet (300 mg) by mouth daily. 90 tablet 3    Boswellia-Glucosamine-Vit D (OSTEO BI-FLEX ONE PER DAY PO) Take by mouth daily.      Calcium Citrate-Vitamin D (CALCIUM + D PO) Take by mouth daily.      diclofenac (VOLTAREN) 1 % topical gel Apply 4 g topically 4 times daily 100 g 0    ibuprofen (ADVIL/MOTRIN) 200 MG tablet Take 400-600 mg by mouth every 4 hours as needed for pain.      lamoTRIgine (LAMICTAL) 100 MG tablet Take 1 tablet (100 mg) by mouth 2 times daily. 186 tablet 3    Multiple Vitamins-Minerals (MULTIVITAMIN MEN 50+ PO) Take by mouth daily.      Turmeric-Ginger-Black Pepper 125-6-50 MG-MG-MCG CHEW Take 3 chew tab by mouth daily.      vitamin D3 (CHOLECALCIFEROL) 1.25 MG (80968 UT) capsule Take 1 capsule (50,000 Units) by mouth every 7 days. for the next 12 weeks. 12 capsule 0       No Known Allergies       Social History     Tobacco Use    Smoking status: Every Day     Current packs/day: 1.00     Average packs/day: 1 pack/day for 17.0 years (17.0 ttl pk-yrs)     Types: Cigarettes    Smokeless tobacco: Current     Types: Chew   Substance Use Topics    Alcohol use: Yes     Alcohol/week: 14.0 standard drinks of alcohol     Types: 14 Shots of liquor per week     Comment: 2 vodka drinks daily       History   Drug Use    Types: Marijuana     Comment: at bedtime             Review of Systems  Constitutional, HEENT, cardiovascular, pulmonary, GI, , musculoskeletal, neuro, skin, endocrine and psych systems are negative, except as otherwise noted.    Objective    /82   Pulse 78   Temp 97.9  F (36.6  C) (Temporal)   Resp 18   Wt 74.7 kg (164 lb 9.6 oz)   SpO2 99%   BMI  "23.28 kg/m     Estimated body mass index is 23.28 kg/m  as calculated from the following:    Height as of 1/29/25: 1.791 m (5' 10.5\").    Weight as of this encounter: 74.7 kg (164 lb 9.6 oz).  Physical Exam  GENERAL: alert and no distress  EYES: Eyes grossly normal to inspection, PERRL and conjunctivae and sclerae normal  HENT: ear canals and TM's normal, nose and mouth without ulcers or lesions  NECK: no adenopathy, no asymmetry, masses, or scars  RESP: lungs clear to auscultation - no rales, rhonchi or wheezes  CV: regular rate and rhythm, normal S1 S2, no S3 or S4, no murmur, click or rub, no peripheral edema  ABDOMEN: soft, nontender, no hepatosplenomegaly, no masses and bowel sounds normal  MS: no gross musculoskeletal defects noted, no edema  SKIN: no suspicious lesions or rashes  NEURO: Normal strength and tone, mentation intact and speech normal  PSYCH: mentation appears normal, affect normal/bright    No results for input(s): \"HGB\", \"PLT\", \"INR\", \"NA\", \"POTASSIUM\", \"CR\", \"A1C\" in the last 8760 hours.     Diagnostics  Labs pending at this time.  Results will be reviewed when available.   No EKG required, no history of coronary heart disease, significant arrhythmia, peripheral arterial disease or other structural heart disease.    Revised Cardiac Risk Index (RCRI)  The patient has the following serious cardiovascular risks for perioperative complications:   - No serious cardiac risks = 0 points     RCRI Interpretation: 0 points: Class I (very low risk - 0.4% complication rate)         Signed Electronically by: Shai Randhawa MD  A copy of this evaluation report is provided to the requesting physician.         "

## 2025-06-05 NOTE — NURSING NOTE
Prior to immunization administration, verified patients identity using patient s name and date of birth. Please see Immunization Activity for additional information.     Screening Questionnaire for Adult Immunization    Are you sick today?   No   Do you have allergies to medications, food, a vaccine component or latex?   No   Have you ever had a serious reaction after receiving a vaccination?   No   Do you have a long-term health problem with heart, lung, kidney, or metabolic disease (e.g., diabetes), asthma, a blood disorder, no spleen, complement component deficiency, a cochlear implant, or a spinal fluid leak?  Are you on long-term aspirin therapy?   No   Do you have cancer, leukemia, HIV/AIDS, or any other immune system problem?   No   Do you have a parent, brother, or sister with an immune system problem?   No   In the past 3 months, have you taken medications that affect  your immune system, such as prednisone, other steroids, or anticancer drugs; drugs for the treatment of rheumatoid arthritis, Crohn s disease, or psoriasis; or have you had radiation treatments?   No   Have you had a seizure, or a brain or other nervous system problem?   No   During the past year, have you received a transfusion of blood or blood    products, or been given immune (gamma) globulin or antiviral drug?   No   For women: Are you pregnant or is there a chance you could become       pregnant during the next month?   No   Have you received any vaccinations in the past 4 weeks?   No     Immunization questionnaire answers were all negative.      Patient instructed to remain in clinic for 15 minutes afterwards, and to report any adverse reactions.     Screening performed by Ayse Callahan MA on 6/5/2025 at 10:10 AM.

## 2025-06-05 NOTE — PATIENT INSTRUCTIONS
How to Take Your Medication Before Surgery  Preoperative Medication Instructions   Antiplatelet or Anticoagulation Medication Instructions   - We reviewed the medication list and the patient is not on an antiplatelet or anticoagulation medications.    Additional Medication Instructions  Take all scheduled medications on the day of surgery EXCEPT for modifications listed below:   - Herbal medications and vitamins: DO NOT TAKE 14 days prior to surgery.   - ibuprofen (Advil, Motrin): DO NOT TAKE 1 day before surgery.        Patient Education   Preparing for Your Surgery  For Adults  Getting started  In most cases, a nurse will call to review your health history and instructions. They will give you an arrival time based on your scheduled surgery time. Please be ready to share:  Your doctor's clinic name and phone number  Your medical, surgical, and anesthesia history  A list of allergies and sensitivities  A list of medicines, including herbal treatments and over-the-counter drugs  Whether the patient has a legal guardian (ask how to send us the papers in advance)  Note: You may not receive a call if you were seen at our PAC (Preoperative Assessment Center).  Please tell us if you're pregnant--or if there's any chance you might be pregnant. Some surgeries may injure a fetus (unborn baby), so they require a pregnancy test. Surgeries that are safe for a fetus don't always need a test, and you can choose whether to have one.   Preparing for surgery  Within 10 to 30 days of surgery: Have a pre-op exam (sometimes called an H&P, or History and Physical). This can be done at a clinic or pre-operative center.  If you're having a , you may not need this exam. Talk to your care team.  At your pre-op exam, talk to your care team about all medicines you take. (This includes CBD oil and any drugs, such as THC, marijuana, and other forms of cannabis.) If you need to stop any medicine before surgery, ask when to start taking  it again.  This is for your safety. Many medicines and drugs can make you bleed too much during surgery. Some change how well surgery (anesthesia) drugs work.  Call your insurance company to let them know you're having surgery. (If you don't have insurance, call [phone].)  Call your clinic if there's any change in your health. This includes a scrape or scratch near the surgery site, or any signs of a cold (sore throat, runny nose, cough, rash, fever).  Eating and drinking guidelines  For your safety: Unless your surgeon tells you otherwise, follow the guidelines below.  Eat and drink as normal until 8 hours before you arrive for surgery. After that, no food or milk. You can spit out gum when you arrive.  Drink clear liquids until 2 hours before you arrive. These are liquids you can see through, like water, Gatorade, and Propel Water. They also include plain black coffee and tea (no cream or milk).  No alcohol for 24 hours before you arrive. The night before surgery, stop any drinks that contain THC.  If your care team tells you to take medicine on the morning of surgery, it's okay to take it with a sip of water. No other medicines or drugs are allowed (including CBD oil)--follow your care team's instructions.  If you have questions the day of surgery, call your hospital or surgery center.   Preventing infection  Shower or bathe the night before and the morning of surgery. Follow the instructions your clinic gave you. (If no instructions, use regular soap.)  Don't shave or clip hair near your surgery site. We'll remove the hair if needed.  Don't smoke or vape the morning of surgery. No chewing tobacco for 6 hours before you arrive. A nicotine patch is okay. You may spit out nicotine gum when you arrive.  For some surgeries, the surgeon will tell you to fully quit smoking and nicotine.  We will make every effort to keep you safe from infection. We will:  Clean our hands often with soap and water (or an  alcohol-based hand rub).  Clean the skin at your surgery site with a special soap that kills germs.  Give you a special gown to keep you warm. (Cold raises the risk of infection.)  Wear hair covers, masks, gowns, and gloves during surgery.  Give antibiotic medicine, if prescribed. Not all surgeries need this medicine.  What to bring on the day of surgery  Photo ID and insurance card  Copy of your health care directive, if you have one  Glasses and hearing aids (bring cases)  You can't wear contacts during surgery  Inhaler and eye drops, if you use them (tell us about these when you arrive)  CPAP machine or breathing device, if you use them  A few personal items, if spending the night  If you have . . .  A pacemaker, ICD (cardiac defibrillator), or other implant: Bring the ID card.  An implanted stimulator: Bring the remote control.  A legal guardian: Bring a copy of the certified (court-stamped) guardianship papers.  Please remove any jewelry, including body piercings. Leave jewelry and other valuables at home.  If you're going home the day of surgery  You must have a support person drive you home. They should stay with you overnight, and they may need to help with your self-care.  If you don't have a support person, please tells us as soon as possible. We can help.  After surgery  If it's hard to control your pain or you need more pain medicine, please call your surgeon's office.  Questions?   If you have any questions for your care team, list them here:   ____________________________________________________________________________________________________________________________________________________________________________________________________________________________________________________________  For informational purposes only. Not to replace the advice of your health care provider. Copyright   2003, 2019 [facility]. All rights reserved. Clinically reviewed by Pito Casanova MD.  [employer] [id number] - REV 02/25.

## 2025-06-06 LAB — HCV AB SERPL QL IA: NONREACTIVE

## 2025-06-09 ENCOUNTER — RESULTS FOLLOW-UP (OUTPATIENT)
Dept: FAMILY MEDICINE | Facility: CLINIC | Age: 50
End: 2025-06-09

## 2025-06-12 ENCOUNTER — ANESTHESIA EVENT (OUTPATIENT)
Dept: SURGERY | Facility: CLINIC | Age: 50
End: 2025-06-12
Payer: OTHER MISCELLANEOUS

## 2025-06-12 ENCOUNTER — ANESTHESIA (OUTPATIENT)
Dept: SURGERY | Facility: CLINIC | Age: 50
End: 2025-06-12
Payer: OTHER MISCELLANEOUS

## 2025-06-12 ENCOUNTER — HOSPITAL ENCOUNTER (OUTPATIENT)
Facility: CLINIC | Age: 50
Discharge: HOME | End: 2025-06-12
Attending: ORTHOPAEDIC SURGERY | Admitting: ORTHOPAEDIC SURGERY
Payer: OTHER MISCELLANEOUS

## 2025-06-12 VITALS
OXYGEN SATURATION: 96 % | DIASTOLIC BLOOD PRESSURE: 80 MMHG | TEMPERATURE: 97.3 F | SYSTOLIC BLOOD PRESSURE: 129 MMHG | HEART RATE: 56 BPM | RESPIRATION RATE: 14 BRPM

## 2025-06-12 DIAGNOSIS — Z98.890 STATUS POST ARTHROSCOPY OF LEFT KNEE: Primary | ICD-10-CM

## 2025-06-12 PROCEDURE — 258N000003 HC RX IP 258 OP 636: Performed by: NURSE ANESTHETIST, CERTIFIED REGISTERED

## 2025-06-12 PROCEDURE — 250N000013 HC RX MED GY IP 250 OP 250 PS 637: Performed by: PHYSICIAN ASSISTANT

## 2025-06-12 PROCEDURE — 370N000017 HC ANESTHESIA TECHNICAL FEE, PER MIN: Performed by: ORTHOPAEDIC SURGERY

## 2025-06-12 PROCEDURE — 250N000011 HC RX IP 250 OP 636: Performed by: ORTHOPAEDIC SURGERY

## 2025-06-12 PROCEDURE — 250N000011 HC RX IP 250 OP 636: Mod: JW | Performed by: NURSE ANESTHETIST, CERTIFIED REGISTERED

## 2025-06-12 PROCEDURE — 272N000001 HC OR GENERAL SUPPLY STERILE: Performed by: ORTHOPAEDIC SURGERY

## 2025-06-12 PROCEDURE — 999N000141 HC STATISTIC PRE-PROCEDURE NURSING ASSESSMENT: Performed by: ORTHOPAEDIC SURGERY

## 2025-06-12 PROCEDURE — 250N000025 HC SEVOFLURANE, PER MIN: Performed by: ORTHOPAEDIC SURGERY

## 2025-06-12 PROCEDURE — 710N000010 HC RECOVERY PHASE 1, LEVEL 2, PER MIN: Performed by: ORTHOPAEDIC SURGERY

## 2025-06-12 PROCEDURE — 29881 ARTHRS KNE SRG MNISECTMY M/L: CPT | Mod: LT | Performed by: ORTHOPAEDIC SURGERY

## 2025-06-12 PROCEDURE — 710N000012 HC RECOVERY PHASE 2, PER MINUTE: Performed by: ORTHOPAEDIC SURGERY

## 2025-06-12 PROCEDURE — 250N000009 HC RX 250: Performed by: NURSE ANESTHETIST, CERTIFIED REGISTERED

## 2025-06-12 PROCEDURE — 360N000076 HC SURGERY LEVEL 3, PER MIN: Performed by: ORTHOPAEDIC SURGERY

## 2025-06-12 RX ORDER — ONDANSETRON 2 MG/ML
4 INJECTION INTRAMUSCULAR; INTRAVENOUS EVERY 30 MIN PRN
Status: DISCONTINUED | OUTPATIENT
Start: 2025-06-12 | End: 2025-06-12 | Stop reason: HOSPADM

## 2025-06-12 RX ORDER — HYDROXYZINE HYDROCHLORIDE 25 MG/1
25 TABLET, FILM COATED ORAL
Status: DISCONTINUED | OUTPATIENT
Start: 2025-06-12 | End: 2025-06-12 | Stop reason: HOSPADM

## 2025-06-12 RX ORDER — ONDANSETRON 4 MG/1
4 TABLET, ORALLY DISINTEGRATING ORAL
Status: DISCONTINUED | OUTPATIENT
Start: 2025-06-12 | End: 2025-06-12 | Stop reason: HOSPADM

## 2025-06-12 RX ORDER — DEXAMETHASONE SODIUM PHOSPHATE 10 MG/ML
4 INJECTION, SOLUTION INTRAMUSCULAR; INTRAVENOUS
Status: DISCONTINUED | OUTPATIENT
Start: 2025-06-12 | End: 2025-06-12 | Stop reason: HOSPADM

## 2025-06-12 RX ORDER — ONDANSETRON 4 MG/1
4 TABLET, ORALLY DISINTEGRATING ORAL EVERY 30 MIN PRN
Status: DISCONTINUED | OUTPATIENT
Start: 2025-06-12 | End: 2025-06-12 | Stop reason: HOSPADM

## 2025-06-12 RX ORDER — OXYCODONE HYDROCHLORIDE 5 MG/1
5 TABLET ORAL
Status: COMPLETED | OUTPATIENT
Start: 2025-06-12 | End: 2025-06-12

## 2025-06-12 RX ORDER — ACETAMINOPHEN 325 MG/1
650 TABLET ORAL EVERY 4 HOURS PRN
Qty: 50 TABLET | Refills: 0 | Status: SHIPPED | OUTPATIENT
Start: 2025-06-12

## 2025-06-12 RX ORDER — CEFAZOLIN SODIUM/WATER 2 G/20 ML
2 SYRINGE (ML) INTRAVENOUS
Status: COMPLETED | OUTPATIENT
Start: 2025-06-12 | End: 2025-06-12

## 2025-06-12 RX ORDER — BUPIVACAINE HYDROCHLORIDE 2.5 MG/ML
INJECTION, SOLUTION INFILTRATION; PERINEURAL PRN
Status: DISCONTINUED | OUTPATIENT
Start: 2025-06-12 | End: 2025-06-12 | Stop reason: HOSPADM

## 2025-06-12 RX ORDER — NALOXONE HYDROCHLORIDE 0.4 MG/ML
0.1 INJECTION, SOLUTION INTRAMUSCULAR; INTRAVENOUS; SUBCUTANEOUS
Status: DISCONTINUED | OUTPATIENT
Start: 2025-06-12 | End: 2025-06-12 | Stop reason: HOSPADM

## 2025-06-12 RX ORDER — MEPERIDINE HYDROCHLORIDE 25 MG/ML
12.5 INJECTION INTRAMUSCULAR; INTRAVENOUS; SUBCUTANEOUS EVERY 5 MIN PRN
Status: DISCONTINUED | OUTPATIENT
Start: 2025-06-12 | End: 2025-06-12 | Stop reason: HOSPADM

## 2025-06-12 RX ORDER — FENTANYL CITRATE 50 UG/ML
25 INJECTION, SOLUTION INTRAMUSCULAR; INTRAVENOUS EVERY 5 MIN PRN
Status: DISCONTINUED | OUTPATIENT
Start: 2025-06-12 | End: 2025-06-12 | Stop reason: HOSPADM

## 2025-06-12 RX ORDER — LABETALOL HYDROCHLORIDE 5 MG/ML
10 INJECTION, SOLUTION INTRAVENOUS
Status: DISCONTINUED | OUTPATIENT
Start: 2025-06-12 | End: 2025-06-12 | Stop reason: HOSPADM

## 2025-06-12 RX ORDER — METHOCARBAMOL 750 MG/1
750 TABLET, FILM COATED ORAL
Status: COMPLETED | OUTPATIENT
Start: 2025-06-12 | End: 2025-06-12

## 2025-06-12 RX ORDER — LIDOCAINE 40 MG/G
CREAM TOPICAL
Status: DISCONTINUED | OUTPATIENT
Start: 2025-06-12 | End: 2025-06-12 | Stop reason: HOSPADM

## 2025-06-12 RX ORDER — ACETAMINOPHEN 325 MG/1
650 TABLET ORAL
Status: DISCONTINUED | OUTPATIENT
Start: 2025-06-12 | End: 2025-06-12 | Stop reason: HOSPADM

## 2025-06-12 RX ORDER — SODIUM CHLORIDE, SODIUM LACTATE, POTASSIUM CHLORIDE, CALCIUM CHLORIDE 600; 310; 30; 20 MG/100ML; MG/100ML; MG/100ML; MG/100ML
INJECTION, SOLUTION INTRAVENOUS CONTINUOUS
Status: DISCONTINUED | OUTPATIENT
Start: 2025-06-12 | End: 2025-06-12 | Stop reason: HOSPADM

## 2025-06-12 RX ORDER — AMOXICILLIN 250 MG
1-2 CAPSULE ORAL 2 TIMES DAILY
Qty: 30 TABLET | Refills: 0 | Status: SHIPPED | OUTPATIENT
Start: 2025-06-12

## 2025-06-12 RX ORDER — ALBUTEROL SULFATE 0.83 MG/ML
2.5 SOLUTION RESPIRATORY (INHALATION) EVERY 4 HOURS PRN
Status: DISCONTINUED | OUTPATIENT
Start: 2025-06-12 | End: 2025-06-12 | Stop reason: HOSPADM

## 2025-06-12 RX ORDER — HYDROMORPHONE HCL IN WATER/PF 6 MG/30 ML
0.2 PATIENT CONTROLLED ANALGESIA SYRINGE INTRAVENOUS EVERY 5 MIN PRN
Status: DISCONTINUED | OUTPATIENT
Start: 2025-06-12 | End: 2025-06-12 | Stop reason: HOSPADM

## 2025-06-12 RX ORDER — FENTANYL CITRATE 50 UG/ML
50 INJECTION, SOLUTION INTRAMUSCULAR; INTRAVENOUS EVERY 5 MIN PRN
Status: DISCONTINUED | OUTPATIENT
Start: 2025-06-12 | End: 2025-06-12 | Stop reason: HOSPADM

## 2025-06-12 RX ORDER — CEFAZOLIN SODIUM/WATER 2 G/20 ML
2 SYRINGE (ML) INTRAVENOUS SEE ADMIN INSTRUCTIONS
Status: DISCONTINUED | OUTPATIENT
Start: 2025-06-12 | End: 2025-06-12 | Stop reason: HOSPADM

## 2025-06-12 RX ORDER — HYDROMORPHONE HYDROCHLORIDE 1 MG/ML
0.5 INJECTION, SOLUTION INTRAMUSCULAR; INTRAVENOUS; SUBCUTANEOUS EVERY 5 MIN PRN
Status: DISCONTINUED | OUTPATIENT
Start: 2025-06-12 | End: 2025-06-12 | Stop reason: HOSPADM

## 2025-06-12 RX ORDER — ASPIRIN 81 MG/1
81 TABLET ORAL DAILY
Qty: 84 TABLET | Refills: 0 | Status: SHIPPED | OUTPATIENT
Start: 2025-06-12 | End: 2025-09-04

## 2025-06-12 RX ORDER — FENTANYL CITRATE 50 UG/ML
INJECTION, SOLUTION INTRAMUSCULAR; INTRAVENOUS PRN
Status: DISCONTINUED | OUTPATIENT
Start: 2025-06-12 | End: 2025-06-12

## 2025-06-12 RX ORDER — ONDANSETRON 2 MG/ML
INJECTION INTRAMUSCULAR; INTRAVENOUS PRN
Status: DISCONTINUED | OUTPATIENT
Start: 2025-06-12 | End: 2025-06-12

## 2025-06-12 RX ORDER — MORPHINE SULFATE 10 MG/ML
6 INJECTION, SOLUTION INTRAMUSCULAR; INTRAVENOUS ONCE
Status: DISCONTINUED | OUTPATIENT
Start: 2025-06-12 | End: 2025-06-12 | Stop reason: HOSPADM

## 2025-06-12 RX ORDER — KETOROLAC TROMETHAMINE 30 MG/ML
INJECTION, SOLUTION INTRAMUSCULAR; INTRAVENOUS PRN
Status: DISCONTINUED | OUTPATIENT
Start: 2025-06-12 | End: 2025-06-12

## 2025-06-12 RX ORDER — GLYCOPYRROLATE 0.2 MG/ML
INJECTION, SOLUTION INTRAMUSCULAR; INTRAVENOUS PRN
Status: DISCONTINUED | OUTPATIENT
Start: 2025-06-12 | End: 2025-06-12

## 2025-06-12 RX ORDER — HYDROCODONE BITARTRATE AND ACETAMINOPHEN 5; 325 MG/1; MG/1
1-2 TABLET ORAL EVERY 4 HOURS PRN
Qty: 28 TABLET | Refills: 0 | Status: SHIPPED | OUTPATIENT
Start: 2025-06-12

## 2025-06-12 RX ORDER — LIDOCAINE HYDROCHLORIDE 20 MG/ML
INJECTION, SOLUTION INFILTRATION; PERINEURAL PRN
Status: DISCONTINUED | OUTPATIENT
Start: 2025-06-12 | End: 2025-06-12

## 2025-06-12 RX ORDER — PROPOFOL 10 MG/ML
INJECTION, EMULSION INTRAVENOUS PRN
Status: DISCONTINUED | OUTPATIENT
Start: 2025-06-12 | End: 2025-06-12

## 2025-06-12 RX ADMIN — ONDANSETRON 4 MG: 2 INJECTION INTRAMUSCULAR; INTRAVENOUS at 08:01

## 2025-06-12 RX ADMIN — FENTANYL CITRATE 100 MCG: 50 INJECTION INTRAMUSCULAR; INTRAVENOUS at 07:43

## 2025-06-12 RX ADMIN — PROPOFOL 250 MG: 10 INJECTION, EMULSION INTRAVENOUS at 07:51

## 2025-06-12 RX ADMIN — GLYCOPYRROLATE 0.2 MG: 0.2 INJECTION, SOLUTION INTRAMUSCULAR; INTRAVENOUS at 08:03

## 2025-06-12 RX ADMIN — MIDAZOLAM 2 MG: 1 INJECTION INTRAMUSCULAR; INTRAVENOUS at 07:43

## 2025-06-12 RX ADMIN — OXYCODONE HYDROCHLORIDE 5 MG: 5 TABLET ORAL at 09:38

## 2025-06-12 RX ADMIN — METHOCARBAMOL 750 MG: 750 TABLET, FILM COATED ORAL at 09:41

## 2025-06-12 RX ADMIN — SODIUM CHLORIDE, SODIUM LACTATE, POTASSIUM CHLORIDE, AND CALCIUM CHLORIDE: .6; .31; .03; .02 INJECTION, SOLUTION INTRAVENOUS at 06:39

## 2025-06-12 RX ADMIN — KETOROLAC TROMETHAMINE 15 MG: 30 INJECTION, SOLUTION INTRAMUSCULAR at 08:36

## 2025-06-12 RX ADMIN — ACETAMINOPHEN 650 MG: 325 TABLET, FILM COATED ORAL at 09:41

## 2025-06-12 RX ADMIN — HYDROMORPHONE HYDROCHLORIDE 0.5 MG: 1 INJECTION, SOLUTION INTRAMUSCULAR; INTRAVENOUS; SUBCUTANEOUS at 08:09

## 2025-06-12 RX ADMIN — Medication 2 G: at 07:38

## 2025-06-12 RX ADMIN — LIDOCAINE HYDROCHLORIDE 50 MG: 20 INJECTION, SOLUTION INFILTRATION; PERINEURAL at 07:51

## 2025-06-12 ASSESSMENT — ACTIVITIES OF DAILY LIVING (ADL)
ADLS_ACUITY_SCORE: 41

## 2025-06-12 ASSESSMENT — LIFESTYLE VARIABLES: TOBACCO_USE: 1

## 2025-06-12 NOTE — ANESTHESIA CARE TRANSFER NOTE
Patient: Claude Higgins    Procedure: Procedure(s):  ARTHROSCOPY, KNEE with partial medial menisectomy       Diagnosis: Arthritis of left knee [M17.12]  Diagnosis Additional Information: No value filed.    Anesthesia Type:   Spinal     Note:    Oropharynx: oropharynx clear of all foreign objects and spontaneously breathing  Level of Consciousness: drowsy  Oxygen Supplementation: face mask    Independent Airway: airway patency satisfactory and stable  Dentition: dentition unchanged  Vital Signs Stable: post-procedure vital signs reviewed and stable  Report to RN Given: handoff report given  Patient transferred to: PACU    Handoff Report: Identifed the Patient, Identified the Reponsible Provider, Reviewed the pertinent medical history, Discussed the surgical course, Reviewed Intra-OP anesthesia mangement and issues during anesthesia, Set expectations for post-procedure period and Allowed opportunity for questions and acknowledgement of understanding      Vitals:  Vitals Value Taken Time   BP     Temp     Pulse     Resp     SpO2         Electronically Signed By: NIC Cedillo CRNA  June 12, 2025  8:53 AM

## 2025-06-12 NOTE — ANESTHESIA PROCEDURE NOTES
Airway       Patient location during procedure: OR  Staff -        Performed By: CRNA  Consent for Airway        Urgency: elective  Indications and Patient Condition       Indications for airway management: rianna-procedural       Induction type:intravenous       Mask difficulty assessment: 1 - vent by mask    Final Airway Details       Final airway type: supraglottic airway    Supraglottic Airway Details        Type: LMA       Brand: I-Gel       LMA size: 5    Post intubation assessment        Placement verified by: capnometry, equal breath sounds and chest rise        Number of attempts at approach: 1       Number of other approaches attempted: 0       Secured with: plastic tape       Ease of procedure: easy       Dentition: Intact and Unchanged

## 2025-06-12 NOTE — ANESTHESIA PREPROCEDURE EVALUATION
Anesthesia Pre-Procedure Evaluation    Patient: Claude Higgins   MRN: [id number] : 1975          Procedure : Procedure(s):  ARTHROSCOPY, KNEE         Past Medical History:   Diagnosis Date    Alcohol use     Out-patient chemical dependancy treatment in     GERD (gastroesophageal reflux disease)     Inguinal hernia 2015    left    Tobacco abuse       Past Surgical History:   Procedure Laterality Date    HERNIA REPAIR, INGUINAL RT/LT Right     HERNIORRHAPHY INGUINAL Left 7/10/2015    Procedure: HERNIORRHAPHY INGUINAL;  Surgeon: Vicente Aleman MD;  Location: [facility] OR    SURGICAL HISTORY OF -  Right 2003-    Tib/fib, ORIF, bone and skin grafting and arterial bypass    SURGICAL HISTORY OF -  Right     Tib/fib, hardware removal.     TESTICLE SURGERY Right     Torsion correction, and prev. undescended      No Known Allergies   Social History     Tobacco Use    Smoking status: Every Day     Current packs/day: 1.00     Average packs/day: 1 pack/day for 17.0 years (17.0 ttl pk-yrs)     Types: Cigarettes    Smokeless tobacco: Current     Types: Chew   Substance Use Topics    Alcohol use: Yes     Alcohol/week: 14.0 standard drinks of alcohol     Types: 14 Shots of liquor per week     Comment: 2 vodka drinks daily      Wt Readings from Last 1 Encounters:   25 74.7 kg (164 lb 9.6 oz)        Anesthesia Evaluation   Pt has had prior anesthetic. Type: General and MAC.        ROS/MED HX  ENT/Pulmonary:     (+)                tobacco use, Current use,                       Neurologic:  - neg neurologic ROS     Cardiovascular:     (+) Dyslipidemia - -   -  - -                                      METS/Exercise Tolerance:     Hematologic:  - neg hematologic  ROS     Musculoskeletal:   (+)  arthritis,             GI/Hepatic:     (+) GERD,                   Renal/Genitourinary:  - neg Renal ROS     Endo:  - neg endo ROS     Psychiatric/Substance Use:     (+)   alcohol abuse  Recreational drug usage:  "Cannabis.    Infectious Disease:  - neg infectious disease ROS     Malignancy:  - neg malignancy ROS     Other:  - neg other ROS            Physical Exam  Airway  Mallampati: II  TM distance: >3 FB  Neck ROM: full  Upper bite lip test: II  Mouth opening: >= 4 cm    Cardiovascular - normal exam  Rhythm: regular  Rate: normal rate     Dental   (+) Edentulous      Pulmonary - normal examBreath sounds clear to auscultation        Neurological - normal exam  He appears awake, alert and oriented x3.    Other Findings       OUTSIDE LABS:  CBC:   Lab Results   Component Value Date    WBC 5.0 10/07/2016    WBC 8.8 08/20/2016    HGB 14.1 10/07/2016    HGB 14.7 08/20/2016    HCT 40.8 10/07/2016    HCT 42.0 08/20/2016     10/07/2016     08/20/2016     BMP:   Lab Results   Component Value Date     06/05/2025     10/07/2016    POTASSIUM 4.1 06/05/2025    POTASSIUM 4.6 10/07/2016    CHLORIDE 107 06/05/2025    CHLORIDE 108 10/07/2016    CO2 27 06/05/2025    CO2 26 10/07/2016    BUN 13.1 06/05/2025    BUN 11 10/07/2016    CR 0.61 (L) 06/05/2025    CR 0.70 10/07/2016    GLC 94 06/05/2025    GLC 80 10/07/2016     COAGS: No results found for: \"PTT\", \"INR\", \"FIBR\"  POC: No results found for: \"BGM\", \"HCG\", \"HCGS\"  HEPATIC:   Lab Results   Component Value Date    ALBUMIN 4.4 06/05/2025    PROTTOTAL 8.3 06/05/2025    ALT 43 06/05/2025    AST 36 06/05/2025    GGT 89 (H) 07/18/2017    ALKPHOS 68 06/05/2025    BILITOTAL 0.4 06/05/2025     OTHER:   Lab Results   Component Value Date    NI 9.8 06/05/2025    TSH 1.01 08/08/2024    CRP 6.8 12/22/2015    SED 16 01/29/2025       Anesthesia Plan    ASA Status:  2      NPO Status: NPO Appropriate   Anesthesia Type: General.  Airway: supraglottic airway.  Induction: intravenous.  Maintenance: Balanced.   Techniques and Equipment:     - Airway:  Planned airway equipment includes supraglottic airway.     - Monitoring Plan: standard ASA monitoring     Consents    Anesthesia " Plan(s) and associated risks, benefits, and realistic alternatives discussed. Questions answered and patient/representative(s) expressed understanding.     - Discussed: CRNA     - Discussed with:  Patient        - Pt is DNR/DNI Status: no DNR          Postoperative Care    Pain management: peripheral nerve block, multimodal analgesia.     Comments:    Other Comments: The risks and benefits of anesthesia, and the alternatives where applicable, have been discussed with the patient, and they wish to proceed.                  NIC Cedillo CRNA    I have reviewed the pertinent notes and labs in the chart from the past 30 days and (re)examined the patient.  Any updates or changes from those notes are reflected in this note.    Clinically Significant Risk Factors Present on Admission

## 2025-06-12 NOTE — DISCHARGE INSTRUCTIONS
Do not take any NSAIDs (ibuprofen, advil, aleve, motrin, etc) until 2:30 pm.          General Knee Arthroscopy Discharge Instructions                                     [phone]  Bone and Joint Service Line for issues or concerns      General Care:  After surgery you may feel tired/sleepy. This is normal. Please have someone stay with you for 24 hours after surgery. You should avoid driving for 1-2 days after surgery, as your reaction time may be slow. You should not drive at all if you have had surgery on your arms, right leg and/or are taking narcotic pain medications until released by your doctor. If you have any question along the way please contact the office. If you feel it is an issue cannot wait for normal office hours, contact the on-call physician. Use ice on the knee intermittently. Elevate your leg with a couple of pillows placed under your ankle/calf area. Do this for the first couple days frequently.     Bandages:   Change your bandage after the first 72 hours. You may use Band-Aids or sterile gauze with an ACE wrap. It s normal to have some blood-tinged fluid on your bandages, this will usually continue for the first day or two. Keep the area clean and dry. Do not apply any ointments. Use the ACE wrap until you are seen at your follow up.     Bathing:  Do not submerge your incision in water such as a bath or pool. It is ok to shower after removing your initial bandage after the first 2 days from surgery. Avoid any excessively hot showers, baths, or hot tubes after surgery.     Follow up:  Your follow up appointment should already be scheduled. If its not, please call the office to verify an appointment 10 days after surgery.     Diet:  Start with non-alcoholic liquids at first, particularly water or sports drinks after surgery. Progress to bland foods such as crackers and bread and finally to your normal diet if you have no problems.     Pain control:  Take your pain medications as prescribed.  Use the pain medication liberally over the first 48 hour, then taper your use. These medications may make you sleepy. Do not drive, operate equipment, or drink alcohol when taking these. Using ice intermittently (15 minutes on and 15 minutes off) can also help. You may take Tylenol or extra strength Tylenol (Generic name is acetaminophen) or NSAIDs (Motrin, Ibuprofen, Aleve, Naproxen) as directed on the bottle for additional relief or in place of the prescribed pain medications as your pain gets better. If the medications cause a reaction such as nausea or skin rash, stop taking them and contact your doctor. Please plan accordingly, pain medications will not be re-filled on the weekends or at night. Call the office during the day if you need more medications.    Crutches:  Use crutches/walker/cane only if needed after surgery. You can stop using these when you feel stable on your feet.     Braces:  Some surgeries will require the use of a brace. Use this brace as directed.     Physical Therapy:  We ordered physical therapy to work on knee range of motion strengthening and gait training after surgery.    Activity:  Unless otherwise instructed, you can weight bear as tolerated. While laying or sitting down you should straighten your knee all the way out and then gently work on bending the knee back. Do not worry at first if your knee feels stiff and will not bend like normal, this will get better.     Dressing:  Keep dressing on for the first 2 days, on the 3rd day after surgery you can remove the dressing and do daily dressing changes with gauze and ace or Band-Aids until your follow up appointment. You can always loosen the ace wrap if it feels too tight or you  have numbness or tingling.      Anticoagulation:  We ordered 81 mg aspirin for you to take once a day for 3 months to help prevent any clots in your legs.    Normal findings after surgery:  Numbness and tenderness around the incisions is normal.  You may  have bruising around the incisions.  Your knee will be swollen for 3 weeks after surgery. It will feel  tight  to move.   Low grade fevers less than 100.5 degrees Fahrenheit are normal.       When to call the Office:  Temperature greater than 101.5 degrees Fahreheit.  Fever, chills, and increasing pain in the knee.  Excessive drainage from the incisions that include bright red blood.  Drainage from the incisions sites that appear yellow, pus-like, or foul smelling.  Increasing pain the knee not relieved by the prescribed pain medications or ice.  Pain or swelling in your calf area (in back above your ankle)  Any other effects you feel are significant.    [facility]   Adult Discharge Orders & Instructions     For 24 hours after surgery    Get plenty of rest.  A responsible adult must stay with you for at least 24 hours after you leave the hospital.   Do not drive or use heavy equipment.  If you have weakness or tingling, don't drive or use heavy equipment until this feeling goes away.  Do not drink alcohol.  Avoid strenuous or risky activities.  Ask for help when climbing stairs.   You may feel lightheaded.  If so, sit for a few minutes before standing.  Have someone help you get up.   You may have a slight fever. Call the doctor if your fever is over 100 F (37.7 C) (taken under the tongue) or lasts longer than 24 hours.  You may have a dry mouth, a sore throat, muscle aches or trouble sleeping.  These should go away after 24 hours.  Do not make important or legal decisions.  We don t expect you to have any problems from the surgery or treatment you had today. Just in case, here s what to do if you have pain, upset stomach (nausea), bleeding or infection:  Pain:  Take medicines your doctor has prescribed or over-the-counter medicine they have suggested. Resting and using ice packs can help, too. For surgery on an arm or leg, raise it on a pillow to ease swelling. Call your doctor if these  methods don t work.  Copyright [employer], Licensed under CC4.0 [employer]  Upset stomach (nausea):  Take anti-nausea medicine approved by your doctor. Drink clear liquids like apple juice, ginger ale, broth or 7-Up. Be sure to drink enough fluids. Rest can help, too. Move to normal foods when you re ready.   Bleeding:  In the first 24 hours, you may see a little blood on your dressing, about the size of a quarter. You don t need to worry about this much blood, but if the blood spot keeps getting bigger:  Put pressure on the wound if you can, AND  Call your doctor.  Copyright [employer], Licensed under CC4.0 [employer]  Fever/Infection: Please call your doctor if you have any of these signs:  Redness  Swelling  Wound feels warm  Pain gets worse  Bad-smelling fluid leaks from wound  Fever or chills  Call your doctor for any of the followin.  It has been over 8 to 10 hours since surgery and you are still not able to urinate (pass water).    2.  Headache for over 24 hours.    3.  Numbness, tingling or weakness in your legs the day after surgery (if you had spinal anesthesia).    For patient questions :    24 Hour Nurse Advisor Line: [phone]  Orthopedics: [phone]

## 2025-06-12 NOTE — OP NOTE
Preop Dx:  Medial meniscal tear L knee  Postop Dx:  same  Procedure:  Partial medial meniscectomy L knee    Attending:  Myla  Assist:  Matti Marcano PA-C    Indications for procedure:  Continued symptoms L knee.  MRI:  MR left knee without contrast 8/22/2024 11:34 AM     Techniques: Multiplanar multisequence imaging of the left knee was  obtained without administration of intra-articular or intravenous  contrast using routine protocol.     History: Acute pain of left knee     Comparison: Left knee radiographs 8/7/2024     Findings:     MENISCI:  Medial meniscus: Complex degenerative tearing of the medial meniscus  body with radial and horizontal components and meniscal flap in the  meniscotibial recess.  Lateral meniscus: Blunting of the free edge of posterior horn;  otherwise intact.     LIGAMENTS  Cruciate ligaments: Intact.  Medial supporting structures: Intact.  Lateral supporting structures: Intact.     EXTENSOR MECHANISM  Intact. Slightly T2 hyperintense appearance of suprapatellar fat pad  without convex margins; not meeting the criteria for quadriceps  impingement.     FLUID  Small joint effusion. No substantial Baker's cyst.     OSSEOUS and ARTICULAR STRUCTURES  Bones: Edema like signal in the medial tibial plateau and lesser  degree in the medial femoral condyle. Subcentimeter presumed  enchondroma in the proximal tibia metaphysis.     Patellofemoral compartment: Up to grade IV chondromalacia patella  particularly in the medial facet.     Lateral compartment: No high-grade hyaline cartilage disease.     Medial compartment: Possible high-grade cartilage fissuring in the  central weightbearing surface of lateral femoral condyle compatible  with grade IV chondromalacia (series 6 image 1).     ANCILLARY FINDINGS  None.                                                                      Impression:  1. Complex degenerative tearing of the medial meniscus body with flap  in the meniscotibial recess.     2. Edema  like signal in the medial compartment greater on to the tibia  plateau.     3. Grade IV chondromalacia patella and high-grade cartilage fissuring  in the medial femoral condyle.          Findings at procedure:  Complex tear medial meniscus, debrided with partial meniscectomy, no complications identified.    Procedure:  After verbal and written consent, patient taken to OR 1.  Well leg villatoro and arthroscopy leg villatoro placed as well as tourniquet.  Total tourniquet time 30 minutes.  Arthroscopy through superior lateral portal for in flow, inferior lateral for scope and inferior medial for working portal.  4.0 shaver used for partial meniscectomy along with small up biting instrument.  Patellofemoral with some evidence of chondromalacia/ ACL intact as well as lateral compartment intact.  Knee irrigated and meniscus examined with arthroscopy probe.  Once debridement identified as stable, we closed the wounds with 4-0 nylon in interrupted vertical mattress fashion after further irrigation.  Marcaine 1/4 plain injected into the knee joint.  Xeroform/ sterile cast padding and loose coban utilized as dressing/ modified lee.  No issues or complications identified.    Patient taken to recovery without issue.    Matti Marcano PA-C helped with positioning and closure.

## 2025-06-12 NOTE — ANESTHESIA POSTPROCEDURE EVALUATION
Patient: Claude Higgins    Procedure: Procedure(s):  ARTHROSCOPY, KNEE with partial medial menisectomy       Anesthesia Type:  General    Note:  Disposition: Outpatient   Postop Pain Control: Uneventful            Sign Out: Well controlled pain   PONV: No   Neuro/Psych: Uneventful            Sign Out: Acceptable/Baseline neuro status   Airway/Respiratory: Uneventful            Sign Out: Acceptable/Baseline resp. status   CV/Hemodynamics: Uneventful            Sign Out: Acceptable CV status   Other NRE: NONE   DID A NON-ROUTINE EVENT OCCUR? No    Event details/Postop Comments:  Pt was happy with anesthesia care.  No complications.  I will follow up with the pt if needed.           Last vitals:  Vitals Value Taken Time   /100 06/12/25 09:15   Temp 96.26  F (35.7  C) 06/12/25 09:19   Pulse 53 06/12/25 09:19   Resp 9 06/12/25 09:19   SpO2 97 % 06/12/25 09:19   Vitals shown include unfiled device data.    Electronically Signed By: NIC Cedillo CRNA  June 12, 2025  11:10 AM

## 2025-06-16 ENCOUNTER — THERAPY VISIT (OUTPATIENT)
Dept: PHYSICAL THERAPY | Facility: CLINIC | Age: 50
End: 2025-06-16
Attending: PHYSICIAN ASSISTANT
Payer: OTHER MISCELLANEOUS

## 2025-06-16 DIAGNOSIS — Z98.890 STATUS POST ARTHROSCOPY OF LEFT KNEE: ICD-10-CM

## 2025-06-16 PROCEDURE — 97110 THERAPEUTIC EXERCISES: CPT | Mod: GP

## 2025-06-16 PROCEDURE — 97161 PT EVAL LOW COMPLEX 20 MIN: CPT | Mod: GP

## 2025-06-16 ASSESSMENT — ACTIVITIES OF DAILY LIVING (ADL)
AS_A_RESULT_OF_YOUR_KNEE_INJURY,_HOW_WOULD_YOU_RATE_YOUR_CURRENT_LEVEL_OF_DAILY_ACTIVITY?: SEVERELY ABNORMAL
SQUAT: I AM UNABLE TO DO THE ACTIVITY
GO DOWN STAIRS: ACTIVITY IS VERY DIFFICULT
RISE FROM A CHAIR: ACTIVITY IS SOMEWHAT DIFFICULT
KNEE_ACTIVITY_OF_DAILY_LIVING_SCORE: 30
STIFFNESS: THE SYMPTOM AFFECTS MY ACTIVITY SEVERELY
WALK: ACTIVITY IS FAIRLY DIFFICULT
PAIN: THE SYMPTOM AFFECTS MY ACTIVITY SEVERELY
RISE FROM A CHAIR: ACTIVITY IS SOMEWHAT DIFFICULT
SWELLING: THE SYMPTOM AFFECTS MY ACTIVITY SEVERELY
HOW_WOULD_YOU_RATE_THE_OVERALL_FUNCTION_OF_YOUR_KNEE_DURING_YOUR_USUAL_DAILY_ACTIVITIES?: SEVERELY ABNORMAL
STAND: ACTIVITY IS SOMEWHAT DIFFICULT
KNEE_ACTIVITY_OF_DAILY_LIVING_SUM: 21
HOW_WOULD_YOU_RATE_THE_OVERALL_FUNCTION_OF_YOUR_KNEE_DURING_YOUR_USUAL_DAILY_ACTIVITIES?: SEVERELY ABNORMAL
RAW_SCORE: 21
LIMPING: THE SYMPTOM AFFECTS MY ACTIVITY SEVERELY
AS_A_RESULT_OF_YOUR_KNEE_INJURY,_HOW_WOULD_YOU_RATE_YOUR_CURRENT_LEVEL_OF_DAILY_ACTIVITY?: SEVERELY ABNORMAL
STAND: ACTIVITY IS SOMEWHAT DIFFICULT
SWELLING: THE SYMPTOM AFFECTS MY ACTIVITY SEVERELY
PAIN: THE SYMPTOM AFFECTS MY ACTIVITY SEVERELY
PLEASE_INDICATE_YOR_PRIMARY_REASON_FOR_REFERRAL_TO_THERAPY:: KNEE
SIT WITH YOUR KNEE BENT: ACTIVITY IS SOMEWHAT DIFFICULT
GIVING WAY, BUCKLING OR SHIFTING OF KNEE: THE SYMPTOM AFFECTS MY ACTIVITY MODERATELY
LIMPING: THE SYMPTOM AFFECTS MY ACTIVITY SEVERELY
SIT WITH YOUR KNEE BENT: ACTIVITY IS SOMEWHAT DIFFICULT
WEAKNESS: THE SYMPTOM AFFECTS MY ACTIVITY MODERATELY
KNEEL ON THE FRONT OF YOUR KNEE: I AM UNABLE TO DO THE ACTIVITY
GO UP STAIRS: ACTIVITY IS VERY DIFFICULT
SQUAT: I AM UNABLE TO DO THE ACTIVITY
GO UP STAIRS: ACTIVITY IS VERY DIFFICULT
HOW_WOULD_YOU_RATE_THE_CURRENT_FUNCTION_OF_YOUR_KNEE_DURING_YOUR_USUAL_DAILY_ACTIVITIES_ON_A_SCALE_FROM_0_TO_100_WITH_100_BEING_YOUR_LEVEL_OF_KNEE_FUNCTION_PRIOR_TO_YOUR_INJURY_AND_0_BEING_THE_INABILITY_TO_PERFORM_ANY_OF_YOUR_USUAL_DAILY_ACTIVITIES?: 30
HOW_WOULD_YOU_RATE_THE_CURRENT_FUNCTION_OF_YOUR_KNEE_DURING_YOUR_USUAL_DAILY_ACTIVITIES_ON_A_SCALE_FROM_0_TO_100_WITH_100_BEING_YOUR_LEVEL_OF_KNEE_FUNCTION_PRIOR_TO_YOUR_INJURY_AND_0_BEING_THE_INABILITY_TO_PERFORM_ANY_OF_YOUR_USUAL_DAILY_ACTIVITIES?: 30
KNEEL ON THE FRONT OF YOUR KNEE: I AM UNABLE TO DO THE ACTIVITY
WALK: ACTIVITY IS FAIRLY DIFFICULT
GO DOWN STAIRS: ACTIVITY IS VERY DIFFICULT
GIVING WAY, BUCKLING OR SHIFTING OF KNEE: THE SYMPTOM AFFECTS MY ACTIVITY MODERATELY
STIFFNESS: THE SYMPTOM AFFECTS MY ACTIVITY SEVERELY
WEAKNESS: THE SYMPTOM AFFECTS MY ACTIVITY MODERATELY

## 2025-06-16 NOTE — PROGRESS NOTES
PHYSICAL THERAPY EVALUATION  Type of Visit: Evaluation     Fall Risk Screen:  Have you fallen 2 or more times in the past year?: No  Have you fallen and had an injury in the past year?: Yes  Is patient receiving Physical Therapy Services?: Yes      Subjective   Patient is a 50 year old Male that presents to PT s/p L knee arthroscopy. Was putting down poly and moved to the side and felt a pop in the knee. Had surgery on 6/12/2025. Has been having difficulty with walking, daily activities, stairs and bending. Has been dealing with knee injury for about 9 months   .          Presenting condition or subjective complaint: surgery  Date of onset: 06/12/25    Relevant medical history:     Dates & types of surgery:      Prior diagnostic imaging/testing results: MRI; X-ray; Bone scan     Prior therapy history for the same diagnosis, illness or injury: No      Living Environment  Social support: With a significant other or spouse   Type of home: 2-story   Stairs to enter the home: Yes 9 Is there a railing: Yes     Ramp: No   Stairs inside the home: Yes 9 Is there a railing: Yes     Help at home: None  Equipment owned:       Employment: Yes [employer]  Hobbies/Interests:  [employer]     Patient goals for therapy: have a normal working knee    Pain assessment:    Patient states that pain at best is a 3-4/10 and at its worst is a 8-9/10. Patient states that the pain is primarily located in left knee. Has been taking some acetaminophen and a couple prescribed pain meds. Has been icing multiple times a day for 20-30 minutes     Objective   KNEE EVALUATION  INTEGUMENTARY (edema, incisions): Incision is covered with ace bandage, no increased redness or hardness noted, Edema noted through the bandage     POSTURE: WNL  GAIT:  Weightbearing Status: WBAT  Assistive Device(s): None  Gait Deviations: Decreased step length, Decreased step width, decreased left knee extension and flexion, decreased weight shift towards the left side        ROM:    (Degrees) Left AROM Right AROM   Knee Flexion 85 degrees  WNL   Knee Extension Lacking 8 degrees  WNL     STRENGTH:   Pain: - none + mild ++ moderate +++ severe  Strength Scale: 0-5/5 Left Right   Knee Flexion Deferred  5   Knee Extension Deferred  5   Hip Flexion 5 5   Hip Abduction 5 5     SPECIAL TESTS: Deferred due to post surgical status     FUNCTIONAL TESTS: Deferred due to post surgical status   PALPATION: No increased tenderness along the front of the knee. Slight increase along the hamstring tendons       Assessment & Plan   CLINICAL IMPRESSIONS  Medical Diagnosis: Status post arthroscopy of left knee (    Treatment Diagnosis: Weakness, pain, Decreased Range of motion, decreased activity tolerance   Impression/Assessment: Patient is a 50 year old male s/p L knee arthroscopy.  The following significant findings have been identified: Pain, Decreased ROM/flexibility, Decreased joint mobility, Decreased strength, Impaired balance, Impaired gait, Impaired muscle performance, and Decreased activity tolerance. These impairments interfere with their ability to perform self care tasks, work tasks, recreational activities, household chores, household mobility, and community mobility as compared to previous level of function. Patient would benefit from skilled PT intervention to increase knee strength and range of motion to return to prior level of function.    Clinical Decision Making (Complexity):  Clinical Presentation: Stable/Uncomplicated  Clinical Presentation Rationale: based on medical and personal factors listed in PT evaluation  Clinical Decision Making (Complexity): Low complexity    PLAN OF CARE  Treatment Interventions:  Modalities: Cryotherapy, E-stim, Hot Pack, Ultrasound  Interventions: Gait Training, Manual Therapy, Neuromuscular Re-education, Therapeutic Activity, Therapeutic Exercise    Long Term Goals     PT Goal 1  Goal Identifier: Home Exercise Programs  Goal Description: Patient will  demonstrate independent compliance with HEP > 4 days per weeks to maximize outcomes for increased functional activity.  Rationale: to maximize safety and independence within the home;to maximize safety and independence within the community  Target Date: 08/25/25  PT Goal 2  Goal Identifier: Knee ROM  Goal Description: Patient will demonstrate > 130 degrees of knee flexion and with 0/10 pain  Rationale: to maximize safety and independence with performance of ADLs and functional tasks  Target Date: 07/28/25  PT Goal 3  Goal Identifier: Strength  Goal Description: Patient will demonstrate at least 4+/5 strength with knee extension and flexion with no pain  Rationale: to maximize safety and independence with performance of ADLs and functional tasks;to maximize safety and independence within the home  Target Date: 09/08/25  PT Goal 4  Goal Identifier: Ambulation  Goal Description: Patient will demonstrate walking >300 feet with correct gait mechanics and 0/10 pain  Rationale: to maximize safety and independence with performance of ADLs and functional tasks  Target Date: 09/08/25      Frequency of Treatment: 2x per week (With potential to drop down to x1 per week)  Duration of Treatment: 10 weeks    Recommended Referrals to Other Professionals: None  Education Assessment:   Learner/Method: Patient;Listening;Demonstration;No Barriers to Learning    Risks and benefits of evaluation/treatment have been explained.   Patient/Family/caregiver agrees with Plan of Care.     Evaluation Time:     PT Eval, Low Complexity Minutes (27528): 20       Signing Clinician: Feli Randhawa PT

## 2025-06-24 ENCOUNTER — OFFICE VISIT (OUTPATIENT)
Dept: ORTHOPEDICS | Facility: CLINIC | Age: 50
End: 2025-06-24
Payer: OTHER MISCELLANEOUS

## 2025-06-24 VITALS — BODY MASS INDEX: 23.62 KG/M2 | WEIGHT: 167 LBS | TEMPERATURE: 98.5 F

## 2025-06-24 DIAGNOSIS — Z98.890 STATUS POST ARTHROSCOPY OF LEFT KNEE: Primary | ICD-10-CM

## 2025-06-24 PROCEDURE — 99024 POSTOP FOLLOW-UP VISIT: CPT | Performed by: PHYSICIAN ASSISTANT

## 2025-06-24 ASSESSMENT — PAIN SCALES - GENERAL: PAINLEVEL_OUTOF10: MODERATE PAIN (5)

## 2025-06-24 NOTE — PROGRESS NOTES
Orthopedic Clinic Post-Operative Note    CHIEF COMPLAINT:   Chief Complaint   Patient presents with    Surgical Followup       Partial medial meniscectomy L knee DOS 6-12-25       HISTORY OF PRESENT ILLNESS  Location: Left knee  Rating of Pain: 5 out of 10  Pain is better with: Rest  Pain improving overall: Yes  Associated Features: Denies numbness or tingling shooting burning or electric pain.  Denies any drainage from incision denies any fevers chills nausea or vomiting.  Patient concerns: None.  Patient feels he is getting much better.  Additional History: None    Patient's past medical, surgical, social and family histories reviewed.     REVIEW OF SYSTEMS  Review of systems negative other than positive findings in HPI.    Physical Exam:  Vitals: Temp 98.5  F (36.9  C) (Temporal)   Wt 75.8 kg (167 lb)   BMI 23.62 kg/m    BMI= Body mass index is 23.62 kg/m .  Constitutional: healthy, alert and no acute distress   Psychiatric: mentation appears normal and affect normal/bright  NEURO: no focal deficits, CMS intact left lower extremity   RESP: Normal with easy respirations and no use of accessory muscles to breathe, no audible wheezing or retractions  CV: Calf soft and nontender to palpation, leg warm   SKIN: 3 portal incisions with no erythema swelling or drainage and sutures intact.  Sutures are removed today by myself.  The rest of the knee with no erythema, rashes, excoriation, or breakdown. No evidence of infection.   MUSCULOSKELETAL:  INSPECTION of left knee: No gross deformities, erythema, edema, ecchymosis, atrophy or fasciculations.   PALPATION: No tenderness medial, lateral, anterior and posterior portion of the knee. No specific joint line tenderness.  No prepatella bursa tenderness or pes bursa tenderness. No increased warmth.  No effusion.   ROM: Passive: Extension full, flexion to 105 . All range of motion without catching, locking or pain except for slight pain at maximal flexion   STRENGTH: 5 out of  5 quad and hamstring without pain.   SPECIAL TEST: Negative Lachman's, negative anterior drawer test, negative posterior drawer test. Stable to varus and valgus stress at 30  of flexion. Negative Freddy's.    GAIT: non-antalgic  Lymph: no palpable lymph nodes    Diagnostic Modalities:  No radiographs necessary today.       Impression: 1.  13 days status post left knee arthroscopic partial medial meniscectomy by Dr. Lanza    FOCUSED PLAN:   Patient doing well at 13 days out and pain getting better as well as swelling.  No signs of infection.  Patient only took Norco for the first week and has not since.  Patient is taking his 81 mg of aspirin twice a day and will continue to do so for a full month.  He is engaged physical therapy as an outpatient and feels it is helping getting his muscle function going.  He is to continue this.  Patient educated in washing the wound in 50% betadine and 50% soapy water daily for the next week/until wound is healed.  After visit summary printed out with all details for the patient.  Follow-up with Dr. Lanza at 6 weeks out from surgery this will be on 7/18/2025 about 5-1/2 weeks out from surgery.    Re-x-ray on return: None      This note was dictated with [employer].    Germain Marcano PA-C

## 2025-06-24 NOTE — PATIENT INSTRUCTIONS
Encounter Diagnosis   Name Primary?    Status post arthroscopy of left knee Yes     Rest, ice and elevate above heart level as needed for pain control  1. Incision: They look excellent today.  We have the Steri-Strips out today.  No bandages necessary now.  Start gently washing the incision and incisional area with mix of half Betadine and half soapy water once a day until healed so about another week.  After that you can do vitamin E lotion and massage the area to help break down scar tissue and also allow it to the desensitize.    2.  Medication/anticoagulation: Tylenol as needed if needed.  It sounds like you have been done with the Norco for a while.  Continue your 81 mg of aspirin twice a day until gone which will be at about 1 month.    3. Therapy:  continue working with physical therapy as they will help your Muscles started working better again.    4. Follow-up: With Dr. Lanza on 7/18/2025 this will be at about 5-1/2 weeks out from surgery and that might be your final visit.    [employer] and [employer] may offer reliable information regarding your diagnosis and treatment plan.    THANK YOU for coming in today. If you receive a survey via [employer] or mail please let us know if there was anything you especially appreciated today or if there is any way we can improve our clinic. We appreciate your input.    GENERAL INFORMATION:  My hours are:  Monday: Clinic 720am-440pm  Tuesday: Clinic 8am-440pm  Wednesday: Surgery  Thursday: Admin  Friday: Surgery      [facility] for any issues or concerns: [phone]    I am not in the office Thursdays. Therefore non- urgent calls and medical messages received on Thursday will be addressed when we are back in the office on Wednesday. Urgent matters will be reviewed and addressed by one of our partners in the office as needed.    If lab work was done today as part of your evaluation you will generally be contacted via [employer], mail, or phone with  the results within 1-5 days. If there is an alarming result we will contact you by phone. Lab results come back at varying times, I generally wait until all labs are resulted before making comments on results. Please note labs are automatically released to [facility] (if you have signed up for it) once available-at times you may see these prior to my having a chance to review them as well.    If you need refills please contact your pharmacist. They will send a refill request to me to review. Please allow 3 business days for us to process all refill requests. All narcotic refills should be handled in the clinic at the time of your visit.

## 2025-06-25 ENCOUNTER — THERAPY VISIT (OUTPATIENT)
Dept: PHYSICAL THERAPY | Facility: CLINIC | Age: 50
End: 2025-06-25
Attending: PHYSICIAN ASSISTANT
Payer: OTHER MISCELLANEOUS

## 2025-06-25 DIAGNOSIS — Z98.890 STATUS POST ARTHROSCOPY OF LEFT KNEE: Primary | ICD-10-CM

## 2025-06-25 PROCEDURE — 97110 THERAPEUTIC EXERCISES: CPT | Mod: GP

## 2025-07-07 ENCOUNTER — THERAPY VISIT (OUTPATIENT)
Dept: PHYSICAL THERAPY | Facility: CLINIC | Age: 50
End: 2025-07-07
Attending: PHYSICIAN ASSISTANT
Payer: OTHER MISCELLANEOUS

## 2025-07-07 DIAGNOSIS — Z98.890 STATUS POST ARTHROSCOPY OF LEFT KNEE: Primary | ICD-10-CM

## 2025-07-07 PROCEDURE — 97110 THERAPEUTIC EXERCISES: CPT | Mod: GP

## 2025-07-09 ENCOUNTER — TELEPHONE (OUTPATIENT)
Dept: FAMILY MEDICINE | Facility: CLINIC | Age: 50
End: 2025-07-09
Payer: COMMERCIAL

## 2025-07-09 NOTE — TELEPHONE ENCOUNTER
Patient Quality Outreach    Patient is due for the following:   Colon Cancer Screening  Physical Preventive Adult Physical    Action(s) Taken:   Patient has upcoming appointment, these items will be addressed at that time.    Type of outreach:    Chart review performed, no outreach needed.    Questions for provider review:    None         Susy Swanson  Chart routed to None.

## 2025-07-18 ENCOUNTER — ANCILLARY PROCEDURE (OUTPATIENT)
Dept: GENERAL RADIOLOGY | Facility: CLINIC | Age: 50
End: 2025-07-18
Attending: ORTHOPAEDIC SURGERY
Payer: OTHER MISCELLANEOUS

## 2025-07-18 DIAGNOSIS — Z98.890 STATUS POST ARTHROSCOPY OF LEFT KNEE: ICD-10-CM

## 2025-07-18 PROCEDURE — 73564 X-RAY EXAM KNEE 4 OR MORE: CPT | Mod: TC | Performed by: INTERNAL MEDICINE

## 2025-07-31 ENCOUNTER — THERAPY VISIT (OUTPATIENT)
Dept: PHYSICAL THERAPY | Facility: CLINIC | Age: 50
End: 2025-07-31
Attending: PHYSICIAN ASSISTANT
Payer: OTHER MISCELLANEOUS

## 2025-07-31 DIAGNOSIS — Z98.890 STATUS POST ARTHROSCOPY OF LEFT KNEE: Primary | ICD-10-CM

## 2025-07-31 PROCEDURE — 97110 THERAPEUTIC EXERCISES: CPT | Mod: GP

## 2025-07-31 PROCEDURE — 97112 NEUROMUSCULAR REEDUCATION: CPT | Mod: GP

## 2025-08-05 ENCOUNTER — THERAPY VISIT (OUTPATIENT)
Dept: PHYSICAL THERAPY | Facility: CLINIC | Age: 50
End: 2025-08-05
Attending: PHYSICIAN ASSISTANT
Payer: OTHER MISCELLANEOUS

## 2025-08-05 DIAGNOSIS — Z98.890 STATUS POST ARTHROSCOPY OF LEFT KNEE: Primary | ICD-10-CM

## 2025-08-05 PROCEDURE — 97110 THERAPEUTIC EXERCISES: CPT | Mod: GP

## 2025-08-14 ENCOUNTER — THERAPY VISIT (OUTPATIENT)
Dept: PHYSICAL THERAPY | Facility: CLINIC | Age: 50
End: 2025-08-14
Attending: PHYSICIAN ASSISTANT
Payer: OTHER MISCELLANEOUS

## 2025-08-14 DIAGNOSIS — Z98.890 STATUS POST ARTHROSCOPY OF LEFT KNEE: Primary | ICD-10-CM

## 2025-08-14 PROCEDURE — 97110 THERAPEUTIC EXERCISES: CPT | Mod: GP

## 2025-08-14 PROCEDURE — 97112 NEUROMUSCULAR REEDUCATION: CPT | Mod: GP

## 2025-08-25 ENCOUNTER — THERAPY VISIT (OUTPATIENT)
Dept: PHYSICAL THERAPY | Facility: CLINIC | Age: 50
End: 2025-08-25
Attending: PHYSICIAN ASSISTANT
Payer: OTHER MISCELLANEOUS

## 2025-08-25 DIAGNOSIS — Z98.890 STATUS POST ARTHROSCOPY OF LEFT KNEE: Primary | ICD-10-CM

## 2025-08-25 PROCEDURE — 97110 THERAPEUTIC EXERCISES: CPT | Mod: GP

## 2025-08-25 PROCEDURE — 97112 NEUROMUSCULAR REEDUCATION: CPT | Mod: GP

## 2025-09-04 ENCOUNTER — THERAPY VISIT (OUTPATIENT)
Dept: PHYSICAL THERAPY | Facility: CLINIC | Age: 50
End: 2025-09-04
Attending: PHYSICIAN ASSISTANT
Payer: OTHER MISCELLANEOUS

## 2025-09-04 DIAGNOSIS — Z98.890 STATUS POST ARTHROSCOPY OF LEFT KNEE: Primary | ICD-10-CM

## 2025-09-04 PROCEDURE — 97110 THERAPEUTIC EXERCISES: CPT | Mod: GP | Performed by: BEHAVIOR TECHNICIAN

## 2025-09-04 PROCEDURE — 97530 THERAPEUTIC ACTIVITIES: CPT | Mod: GP | Performed by: BEHAVIOR TECHNICIAN

## (undated) DEVICE — GLOVE BIOGEL PI ULTRATOUCH G SZ 8.5 42185

## (undated) DEVICE — SOL NACL 0.9% IRRIG 3000ML BAG 2B7477

## (undated) DEVICE — ESU GROUND PAD UNIVERSAL W/O CORD

## (undated) DEVICE — GLOVE BIOGEL PI ULTRATOUCH G SZ 6.5 42165

## (undated) DEVICE — SU ETHILON 4-0 FS-2 18" 662G

## (undated) DEVICE — SOLUTION WATER 1000ML BOTTLE R5000-01

## (undated) DEVICE — PACK KNEE ARTHROSCOPY CUSTOM

## (undated) DEVICE — GLOVE BIOGEL PI INDICATOR 8.0 LF 41680

## (undated) DEVICE — DRSG STERI STRIP 1/2X4" R1547

## (undated) DEVICE — TUBING SUCTION 12"X1/4" N612

## (undated) DEVICE — SU ETHILON 4-0 PS-2 18" 1667G

## (undated) DEVICE — SUCTION MANIFOLD NEPTUNE 2 SYS 4 PORT 0702-020-000

## (undated) DEVICE — TOURNIQUET SGL  BLADDER 30"X4" BLUE 5921030135

## (undated) DEVICE — STRAP KNEE/BODY 31143004

## (undated) DEVICE — DRAPE STERI U 1015

## (undated) DEVICE — DRAPE MAYO STAND 23X54 8337

## (undated) DEVICE — BURR SHV ART 4MM STR WD MTH GRN DYONICS ELITE 72200730

## (undated) DEVICE — TUBING ARTHROSCOPY PUMP ARTHREX AR-6410

## (undated) DEVICE — GLOVE UNDER INDICATOR PI SZ 6.5 LF 41665

## (undated) RX ORDER — BUPIVACAINE HYDROCHLORIDE 2.5 MG/ML
INJECTION, SOLUTION EPIDURAL; INFILTRATION; INTRACAUDAL; PERINEURAL
Status: DISPENSED
Start: 2025-06-12

## (undated) RX ORDER — PROPOFOL 10 MG/ML
INJECTION, EMULSION INTRAVENOUS
Status: DISPENSED
Start: 2025-06-12

## (undated) RX ORDER — PHENYLEPHRINE HYDROCHLORIDE 10 MG/ML
INJECTION INTRAVENOUS
Status: DISPENSED
Start: 2025-06-12

## (undated) RX ORDER — LIDOCAINE HYDROCHLORIDE 10 MG/ML
INJECTION, SOLUTION EPIDURAL; INFILTRATION; INTRACAUDAL; PERINEURAL
Status: DISPENSED
Start: 2025-06-12

## (undated) RX ORDER — SODIUM CHLORIDE 9 MG/ML
INJECTION, SOLUTION INTRAVENOUS
Status: DISPENSED
Start: 2025-06-12

## (undated) RX ORDER — MORPHINE SULFATE 1 MG/ML
INJECTION, SOLUTION EPIDURAL; INTRATHECAL; INTRAVENOUS
Status: DISPENSED
Start: 2025-06-12

## (undated) RX ORDER — BUPIVACAINE HYDROCHLORIDE AND EPINEPHRINE 2.5; 5 MG/ML; UG/ML
INJECTION, SOLUTION EPIDURAL; INFILTRATION; INTRACAUDAL; PERINEURAL
Status: DISPENSED
Start: 2025-06-12

## (undated) RX ORDER — HYDROMORPHONE HYDROCHLORIDE 1 MG/ML
INJECTION, SOLUTION INTRAMUSCULAR; INTRAVENOUS; SUBCUTANEOUS
Status: DISPENSED
Start: 2025-06-12

## (undated) RX ORDER — KETOROLAC TROMETHAMINE 30 MG/ML
INJECTION, SOLUTION INTRAMUSCULAR; INTRAVENOUS
Status: DISPENSED
Start: 2025-06-12

## (undated) RX ORDER — FENTANYL CITRATE 50 UG/ML
INJECTION, SOLUTION INTRAMUSCULAR; INTRAVENOUS
Status: DISPENSED
Start: 2025-06-12